# Patient Record
Sex: FEMALE | Race: WHITE | Employment: OTHER | URBAN - METROPOLITAN AREA
[De-identification: names, ages, dates, MRNs, and addresses within clinical notes are randomized per-mention and may not be internally consistent; named-entity substitution may affect disease eponyms.]

---

## 2017-01-13 ENCOUNTER — HOSPITAL ENCOUNTER (EMERGENCY)
Facility: CLINIC | Age: 57
Discharge: HOME | End: 2017-01-13
Attending: EMERGENCY MEDICINE | Admitting: EMERGENCY MEDICINE
Payer: COMMERCIAL

## 2017-01-13 VITALS
OXYGEN SATURATION: 100 % | TEMPERATURE: 97.5 F | DIASTOLIC BLOOD PRESSURE: 110 MMHG | HEART RATE: 71 BPM | RESPIRATION RATE: 18 BRPM | SYSTOLIC BLOOD PRESSURE: 173 MMHG

## 2017-01-13 DIAGNOSIS — R07.0 THROAT PAIN: ICD-10-CM

## 2017-01-13 DIAGNOSIS — R03.0 ELEVATED BLOOD PRESSURE READING WITHOUT DIAGNOSIS OF HYPERTENSION: ICD-10-CM

## 2017-01-13 PROCEDURE — 25000132 ZZH RX MED GY IP 250 OP 250 PS 637: Performed by: EMERGENCY MEDICINE

## 2017-01-13 PROCEDURE — 99284 EMERGENCY DEPT VISIT MOD MDM: CPT | Mod: 25

## 2017-01-13 PROCEDURE — 25000125 ZZHC RX 250: Performed by: EMERGENCY MEDICINE

## 2017-01-13 PROCEDURE — 40000275 ZZH STATISTIC RCP TIME EA 10 MIN

## 2017-01-13 PROCEDURE — 94640 AIRWAY INHALATION TREATMENT: CPT

## 2017-01-13 PROCEDURE — 31525 DX LARYNGOSCOPY EXCL NB: CPT

## 2017-01-13 RX ORDER — DIPHENHYDRAMINE HYDROCHLORIDE AND LIDOCAINE HYDROCHLORIDE AND ALUMINUM HYDROXIDE AND MAGNESIUM HYDRO
5 KIT EVERY 6 HOURS PRN
Qty: 237 ML | Refills: 0 | Status: SHIPPED | OUTPATIENT
Start: 2017-01-13 | End: 2017-01-31

## 2017-01-13 RX ORDER — OXYMETAZOLINE HYDROCHLORIDE 0.05 G/100ML
SPRAY NASAL
Status: DISCONTINUED
Start: 2017-01-13 | End: 2017-01-13 | Stop reason: HOSPADM

## 2017-01-13 RX ADMIN — LIDOCAINE HYDROCHLORIDE 3 ML: 40 INJECTION, SOLUTION RETROBULBAR; TOPICAL at 15:19

## 2017-01-13 RX ADMIN — LIDOCAINE HYDROCHLORIDE 30 ML: 20 SOLUTION ORAL; TOPICAL at 15:45

## 2017-01-13 ASSESSMENT — ENCOUNTER SYMPTOMS
TROUBLE SWALLOWING: 1
VOMITING: 1

## 2017-01-13 NOTE — ED PROVIDER NOTES
History     Chief Complaint:  Swallowed Foreign Body    HPI   Desire Moise is a 56 year old female who presents with a swallowed foreign body. Five days ago, the patient was eating pizza bites, when she felt a small bite get lodged at the base of her throat. She tried to dislodge the foreign body through coughing, but was unsuccessful. Later that night, she vomited, but was still unable to dislodge the pizza bite. The patient still feels something lodged at the bottom of her throat, and is having difficulty swallowing fluids. She has not had solid food since the incident although she has been able to swallow liquids and soft foods without difficulty. She has had no similar episodes in the past. She has had no prior endoscopy procedures. Of note, the patient does report having slight acid reflux that began a year ago, but she is not medicated for this reflux.     Allergies:  The patient has no known drug allergies.     Medications:    Norco  Ibuprofen     Past Medical History:    Diverticulosis  Anemia  Spina bifida occulta     Past Surgical History:    [place] teeth extraction  Hysterectomy     Family History:    Breast cancer  Cardiovascular disease  HLD     Social History:  Relationship status:   Tobacco use: Negative  Alcohol use: Negative  The patient presents alone.      Review of Systems   HENT: Positive for trouble swallowing.         Positive for foreign body obstruction.   Gastrointestinal: Positive for vomiting.   All other systems reviewed and are negative.      Physical Exam   First Vitals:  BP: 173/110 mmHg  Temp: 97.5  F (36.4  C)  Temp src: Oral  Pulse: 71  Resp: 18  SpO2: 100 %    Physical Exam    GEN:    Pleasant, age appropriate.     Resting comfortably in the bed.  HEENT:    Tympanic membranes are clear bilaterally.      Oropharynx is moist.       No tonsillar erythema, exudate or asymmetric edema.     No deviation of the uvula.     No pooling of secretions, trismus or sublingual  edema.  Eyes:    Conjunctiva normal, PERRL  Neck:    Supple, no meningismus.       No pain with manipulation of the hyoid.   CV:     Regular rate and rhythm.     No murmurs, rubs or gallops.    PULM:    Clear to auscultation bilateral.       No respiratory distress.       No stridor.  ABD:    Soft, non-tender, non-distended.      No rebound or guarding.     No splenomegaly.  MSK:     No gross deformity to all four extremities.   LYMPH:   No cervical lymphadenopathy.  NEURO:   Alert.  Normal muscular tone, no atrophy.  Skin:    Warm, dry and intact.    PSYCH:    Mood is good and affect is appropriate.      Emergency Department Course     Procedures:  Physician: Marino Vidal MD    Procedure: Fiberoptic Laryngoscopy    Indication:  Sore throat    The most patent nare was prepped with nebulized Lidocaine 4%, 1 ml, and Afrin nasal spray.      The fiberoptic scope was advanced under direct visualization down to the level of the glottic structures.    Findings:  1.  The epiglottis is normal  2.  The lingual tonsil is normal  3.  The glottic tissues, cords, and pyriform recesses appear normal.  4.  No foreign body is noted.  5.  Mild laryngeal wall erythema    There were no complications to the procedure.      Interventions:  1545: GI cocktail, 30 mL, PO      Emergency Department Course:  Nursing notes and vitals reviewed.  I performed an exam of the patient as documented above.  The above workup was undertaken.  1530: I performed the procedure documented above.  I rechecked the patient and discussed results.    Findings and plan explained to the Patient. Patient discharged home with instructions regarding supportive care, medications, and reasons to return. The importance of close follow-up was reviewed. The patient was prescribed Magic mouthwash.       Impression & Plan      Medical Decision Making:  Desire Moise is a 56 year old female who presents to the ED with concern of retained upper esophageal or  laryngeal foreign body. She had no evidence of airway or esophageal obstruction clinically. Fiberoptic laryngoscopy is without abnormality other than mild irritation of laryngeal wall indicating mild soft tissue injury. Patient improved with GI cocktail and was able to tolerate PO fluids. She is safe for discharge home. Magic mouthwash PRN for throat discomfort. Follow up with PCP and return to ED with worsening symptoms.     Diagnosis:    ICD-10-CM   1. Throat pain R07.0   2. Elevated blood pressure reading without diagnosis of hypertension R03.0     Disposition:  Discharge to home with primary care follow up.    Discharge Medications:  DPH-Lido-AlHydr-MgHydr-Simeth (FIRST-MOUTHWASH BLM) SUSP Swish and swallow 5 mLs in mouth every 6 hours as needed, Disp-237 mL, R-0, Local Print     I, Mustapha Jalloh, am serving as a scribe on 1/13/2017 at 2:50 PM to personally document services performed by Marino Vidal MD, based on my observations and the provider's statements to me.    [facility] EMERGENCY DEPARTMENT        Marino Vidal MD  01/13/17 6814

## 2017-01-13 NOTE — ED AVS SNAPSHOT
[facility] Emergency Department    [address] [place] [address]    Phone:  [phone]    Fax:  [phone]                                       Desire Moise   MRN: [id number]    Department:  [facility] Emergency Department   Date of Visit:  1/13/2017           Patient Information     Date Of Birth          1960        Your diagnoses for this visit were:     Throat pain     Elevated blood pressure reading without diagnosis of hypertension        You were seen by Marino Vidal MD.      Follow-up Information     Follow up with Matilde Christopher. Schedule an appointment as soon as possible for a visit in 5 days.    Specialty:  Internal Medicine    Contact information:    [facility]  [address]   [place] [address]  [phone]          Discharge Instructions       Discharge Instructions  Hypertension - High Blood Pressure    During you visit to the Emergency Department, your blood pressure was higher than the recommended blood pressure.  This may be related to stress, pain, medication or other temporary conditions. In these cases, your blood pressure may return to normal on its own. If you have a history of high blood pressure, you may need to have your doctor adjust your medications. Sometimes, your high measurement here may indicate that you have developed high blood pressure that will stay high unless it is treated. Sudden very high blood pressure can cause problems, but usually high blood pressure causes problems over months to years.      Blood pressure is almost never lowered in the Emergency Department, because studies have shown that lowering blood pressure too quickly is much more dangerous than leaving it alone.    You need to follow up with your doctor in 1-3 days to get your blood pressure rechecked.     Return to the Emergency Department if you start to have:    A severe headache.    Chest pain.    Shortness of  breath.    Weakness or numbness that affects one part of the body.    Confusion.    Vision changes.    Significant swelling of legs and/or eyes.    A reaction to any medication started in the Emergency Department.    What can I do to help myself?    Avoid alcohol.    Take any blood pressure medicine that you are prescribed.    Get a good night s sleep.    Lower your salt intake.    Exercise.    Lose weight.    Manage stress.    If blood pressure medication was started in the Emergency Department:    The medicine may not have an immediate effect. The body and brain determine what blood pressure you have. The medicine s job is to retrain the body s  thermostat  to a lower blood pressure.    You will need to follow up with your doctor to see how this medicine is working for you.  If you were given a prescription for medicine here today, be sure to read all of the information (including the package insert) that comes with your prescription.  This will include important information about the medicine, its side effects, and any warnings that you need to know about.  The pharmacist who fills the prescription can provide more information and answer questions you may have about the medicine.  If you have questions or concerns that the pharmacist cannot address, please call or return to the Emergency Department.       Discharge References/Attachments     ESOPHAGEAL FOREIGN BODY, RESOLVED (ENGLISH)      24 Hour Appointment Hotline       To make an appointment at any [facility] clinic, call [phone] ([phone]). If you don't have a family doctor or clinic, we will help you find one. [facility] clinics are conveniently located to serve the needs of you and your family.             Review of your medicines      START taking        Dose / Directions Last dose taken    FIRST-MOUTHWASH BLM Susp   Dose:  5 mL   Quantity:  237 mL        Swish and swallow 5 mLs in mouth every 6 hours as needed   Refills:  0          Our records  show that you are taking the medicines listed below. If these are incorrect, please call your family doctor or clinic.        Dose / Directions Last dose taken    HYDROcodone-acetaminophen 5-325 MG per tablet   Commonly known as:  NORCO   Dose:  1-2 tablet   Quantity:  20 tablet        Take 1-2 tablets by mouth every 4 hours as needed for pain   Refills:  0        IBUPROFEN PO        Refills:  0                Prescriptions were sent or printed at these locations (1 Prescription)                   Other Prescriptions                Printed at Department/Unit printer (1 of 1)         DPH-Lido-AlHydr-MgHydr-Simeth (FIRST-MOUTHWASH BLM) SUSP                Orders Needing Specimen Collection     None      Pending Results     No orders found from 1/12/2017 to 1/14/2017.            Pending Culture Results     No orders found from 1/12/2017 to 1/14/2017.             Test Results from your hospital stay            Clinical Quality Measure: Blood Pressure Screening     Your blood pressure was checked while you were in the emergency department today. The last reading we obtained was  BP: (!) 164/112 mmHg . Please read the guidelines below about what these numbers mean and what you should do about them.  If your systolic blood pressure (the top number) is less than 120 and your diastolic blood pressure (the bottom number) is less than 80, then your blood pressure is normal. There is nothing more that you need to do about it.  If your systolic blood pressure (the top number) is 120-139 or your diastolic blood pressure (the bottom number) is 80-89, your blood pressure may be higher than it should be. You should have your blood pressure rechecked within a year by a primary care provider.  If your systolic blood pressure (the top number) is 140 or greater or your diastolic blood pressure (the bottom number) is 90 or greater, you may have high blood pressure. High blood pressure is treatable, but if left untreated over time it can  "put you at risk for heart attack, stroke, or kidney failure. You should have your blood pressure rechecked by a primary care provider within the next 4 weeks.  If your provider in the emergency department today gave you specific instructions to follow-up with your doctor or provider even sooner than that, you should follow that instruction and not wait for up to 4 weeks for your follow-up visit.        Thank you for choosing [facility]       Thank you for choosing [facility] for your care. Our goal is always to provide you with excellent care. Hearing back from our patients is one way we can continue to improve our services. Please take a few minutes to complete the written survey that you may receive in the mail after you visit with us. Thank you!        [employer] Information     [employer] lets you send messages to your doctor, view your test results, renew your prescriptions, schedule appointments and more. To sign up, go to [email or web address] . Click on \"Log in\" on the left side of the screen, which will take you to the Welcome page. Then click on \"Sign up Now\" on the right side of the page.     You will be asked to enter the access code listed below, as well as some personal information. Please follow the directions to create your username and password.     Your access code is: [email or web address]  Expires: 2017  1:18 AM     Your access code will  in 90 days. If you need help or a new code, please call your [facility] clinic or [phone].        Care EveryWhere ID     This is your Care EveryWhere ID. This could be used by other organizations to access your [facility] medical records  [id number]        After Visit Summary       This is your record. Keep this with you and show to your community pharmacist(s) and doctor(s) at your next visit.                  "

## 2017-01-13 NOTE — DISCHARGE INSTRUCTIONS
Discharge Instructions  Hypertension - High Blood Pressure    During you visit to the Emergency Department, your blood pressure was higher than the recommended blood pressure.  This may be related to stress, pain, medication or other temporary conditions. In these cases, your blood pressure may return to normal on its own. If you have a history of high blood pressure, you may need to have your doctor adjust your medications. Sometimes, your high measurement here may indicate that you have developed high blood pressure that will stay high unless it is treated. Sudden very high blood pressure can cause problems, but usually high blood pressure causes problems over months to years.      Blood pressure is almost never lowered in the Emergency Department, because studies have shown that lowering blood pressure too quickly is much more dangerous than leaving it alone.    You need to follow up with your doctor in 1-3 days to get your blood pressure rechecked.     Return to the Emergency Department if you start to have:    A severe headache.    Chest pain.    Shortness of breath.    Weakness or numbness that affects one part of the body.    Confusion.    Vision changes.    Significant swelling of legs and/or eyes.    A reaction to any medication started in the Emergency Department.    What can I do to help myself?    Avoid alcohol.    Take any blood pressure medicine that you are prescribed.    Get a good night s sleep.    Lower your salt intake.    Exercise.    Lose weight.    Manage stress.    If blood pressure medication was started in the Emergency Department:    The medicine may not have an immediate effect. The body and brain determine what blood pressure you have. The medicine s job is to retrain the body s  thermostat  to a lower blood pressure.    You will need to follow up with your doctor to see how this medicine is working for you.  If you were given a prescription for medicine here today, be sure to read all of  the information (including the package insert) that comes with your prescription.  This will include important information about the medicine, its side effects, and any warnings that you need to know about.  The pharmacist who fills the prescription can provide more information and answer questions you may have about the medicine.  If you have questions or concerns that the pharmacist cannot address, please call or return to the Emergency Department.

## 2017-01-13 NOTE — ED AVS SNAPSHOT
[facility] Emergency Department    [address]    [facility] [address]    Phone:  [phone]    Fax:  [phone]                                       Desire Moise   MRN: [id number]    Department:  [facility] Emergency Department   Date of Visit:  1/13/2017           After Visit Summary Signature Page     I have received my discharge instructions, and my questions have been answered. I have discussed any challenges I see with this plan with the nurse or doctor.    ..........................................................................................................................................  Patient/Patient Representative Signature      ..........................................................................................................................................  Patient Representative Print Name and Relationship to Patient    ..................................................               ................................................  Date                                            Time    ..........................................................................................................................................  Reviewed by Signature/Title    ...................................................              ..............................................  Date                                                            Time

## 2017-01-13 NOTE — ED NOTES
In Triage: ABC's intact. Alert and oriented x 3.  Pt reports she swallowed pizza wrong on Sunday and it has been stuck in throat since. Pt states she cant take it anymore and is uncomfortable.   Able swallow, but with difficulty. Talking in full sentences without difficulty and no SOB noted.

## 2017-01-31 PROCEDURE — 99284 EMERGENCY DEPT VISIT MOD MDM: CPT | Mod: 25

## 2017-01-31 RX ORDER — IBUPROFEN 600 MG/1
600 TABLET, FILM COATED ORAL ONCE
Status: COMPLETED | OUTPATIENT
Start: 2017-01-31 | End: 2017-01-31

## 2017-01-31 RX ADMIN — IBUPROFEN 600 MG: 600 TABLET ORAL at 23:19

## 2017-02-01 ENCOUNTER — HOSPITAL ENCOUNTER (EMERGENCY)
Facility: CLINIC | Age: 57
Discharge: HOME | End: 2017-02-01
Attending: EMERGENCY MEDICINE | Admitting: EMERGENCY MEDICINE
Payer: COMMERCIAL

## 2017-02-01 ENCOUNTER — APPOINTMENT (OUTPATIENT)
Dept: GENERAL RADIOLOGY | Facility: CLINIC | Age: 57
End: 2017-02-01
Attending: EMERGENCY MEDICINE
Payer: COMMERCIAL

## 2017-02-01 VITALS
DIASTOLIC BLOOD PRESSURE: 80 MMHG | WEIGHT: 150 LBS | SYSTOLIC BLOOD PRESSURE: 147 MMHG | BODY MASS INDEX: 24.11 KG/M2 | OXYGEN SATURATION: 99 % | RESPIRATION RATE: 18 BRPM | TEMPERATURE: 98 F | HEART RATE: 118 BPM | HEIGHT: 66 IN

## 2017-02-01 DIAGNOSIS — R07.89 CHEST WALL PAIN: ICD-10-CM

## 2017-02-01 DIAGNOSIS — S13.9XXA SPRAIN OF NECK, INITIAL ENCOUNTER: ICD-10-CM

## 2017-02-01 RX ORDER — METHOCARBAMOL 750 MG/1
750 TABLET, FILM COATED ORAL ONCE
Status: COMPLETED | OUTPATIENT
Start: 2017-02-01 | End: 2017-02-01

## 2017-02-01 RX ORDER — METHOCARBAMOL 750 MG/1
750 TABLET, FILM COATED ORAL 4 TIMES DAILY PRN
Qty: 20 TABLET | Refills: 0 | Status: SHIPPED | OUTPATIENT
Start: 2017-02-01 | End: 2017-09-07

## 2017-02-01 RX ADMIN — METHOCARBAMOL 750 MG: 750 TABLET ORAL at 00:59

## 2017-02-01 ASSESSMENT — ENCOUNTER SYMPTOMS
BACK PAIN: 1
NECK PAIN: 1

## 2017-02-01 NOTE — ED AVS SNAPSHOT
[facility] Emergency Department    [address] [place] [address]    Phone:  [phone]    Fax:  [phone]                                       Desire Moise   MRN: [id number]    Department:  [facility] Emergency Department   Date of Visit:  1/31/2017           Patient Information     Date Of Birth          1960        Your diagnoses for this visit were:     Chest wall pain     Sprain of neck, initial encounter        You were seen by Amilcar Mark MD.      Follow-up Information     Follow up with Matilde Christopher.    Specialty:  Internal Medicine    Contact information:    [facility]  [address] [place] [address]  [phone]        Discharge References/Attachments     CHEST WALL CONTUSION (ENGLISH)    NECK SPRAIN OR STRAIN (ENGLISH)      24 Hour Appointment Hotline       To make an appointment at any [facility], call [phone]). If you don't have a family doctor or clinic, we will help you find one. [facility] clinics are conveniently located to serve the needs of you and your family.             Review of your medicines      START taking        Dose / Directions Last dose taken    methocarbamol 750 MG tablet   Commonly known as:  ROBAXIN   Dose:  750 mg   Quantity:  20 tablet        Take 1 tablet (750 mg) by mouth 4 times daily as needed for muscle spasms   Refills:  0                Prescriptions were sent or printed at these locations (1 Prescription)                   Other Prescriptions                Printed at Department/Unit printer (1 of 1)         methocarbamol (ROBAXIN) 750 MG tablet                Orders Needing Specimen Collection     None      Pending Results     No orders found from 1/31/2017 to 2/2/2017.            Pending Culture Results     No orders found from 1/31/2017 to 2/2/2017.             Test Results from your hospital stay            Clinical Quality Measure: Blood Pressure  Screening     Your blood pressure was checked while you were in the emergency department today. The last reading we obtained was  BP: (!) 175/101 mmHg . Please read the guidelines below about what these numbers mean and what you should do about them.  If your systolic blood pressure (the top number) is less than 120 and your diastolic blood pressure (the bottom number) is less than 80, then your blood pressure is normal. There is nothing more that you need to do about it.  If your systolic blood pressure (the top number) is 120-139 or your diastolic blood pressure (the bottom number) is 80-89, your blood pressure may be higher than it should be. You should have your blood pressure rechecked within a year by a primary care provider.  If your systolic blood pressure (the top number) is 140 or greater or your diastolic blood pressure (the bottom number) is 90 or greater, you may have high blood pressure. High blood pressure is treatable, but if left untreated over time it can put you at risk for heart attack, stroke, or kidney failure. You should have your blood pressure rechecked by a primary care provider within the next 4 weeks.  If your provider in the emergency department today gave you specific instructions to follow-up with your doctor or provider even sooner than that, you should follow that instruction and not wait for up to 4 weeks for your follow-up visit.        Thank you for choosing [facility]       Thank you for choosing [facility] for your care. Our goal is always to provide you with excellent care. Hearing back from our patients is one way we can continue to improve our services. Please take a few minutes to complete the written survey that you may receive in the mail after you visit with us. Thank you!        [employer] Information     [employer] lets you send messages to your doctor, view your test results, renew your prescriptions, schedule appointments and more. To sign up, go to [email or web address] .  "Click on \"Log in\" on the left side of the screen, which will take you to the Welcome page. Then click on \"Sign up Now\" on the right side of the page.     You will be asked to enter the access code listed below, as well as some personal information. Please follow the directions to create your username and password.     Your access code is: [email or web address]  Expires: 2017  1:18 AM     Your access code will  in 90 days. If you need help or a new code, please call your [facility] clinic or [phone].        Care EveryWhere ID     This is your Care EveryWhere ID. This could be used by other organizations to access your [facility] medical records  [id number]        After Visit Summary       This is your record. Keep this with you and show to your community pharmacist(s) and doctor(s) at your next visit.                  "
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
## 2017-02-01 NOTE — ED AVS SNAPSHOT
[facility] Emergency Department    [address]    [facility] [address]    Phone:  [phone]    Fax:  [phone]                                       Desire Moise   MRN: [id number]    Department:  [facility] Emergency Department   Date of Visit:  1/31/2017           After Visit Summary Signature Page     I have received my discharge instructions, and my questions have been answered. I have discussed any challenges I see with this plan with the nurse or doctor.    ..........................................................................................................................................  Patient/Patient Representative Signature      ..........................................................................................................................................  Patient Representative Print Name and Relationship to Patient    ..................................................               ................................................  Date                                            Time    ..........................................................................................................................................  Reviewed by Signature/Title    ...................................................              ..............................................  Date                                                            Time

## 2017-09-07 ENCOUNTER — APPOINTMENT (OUTPATIENT)
Dept: CT IMAGING | Facility: CLINIC | Age: 57
End: 2017-09-07
Attending: INTERNAL MEDICINE
Payer: COMMERCIAL

## 2017-09-07 ENCOUNTER — APPOINTMENT (OUTPATIENT)
Dept: GENERAL RADIOLOGY | Facility: CLINIC | Age: 57
End: 2017-09-07
Attending: EMERGENCY MEDICINE
Payer: COMMERCIAL

## 2017-09-07 ENCOUNTER — HOSPITAL ENCOUNTER (EMERGENCY)
Facility: CLINIC | Age: 57
Discharge: HOME | End: 2017-09-07
Attending: INTERNAL MEDICINE | Admitting: INTERNAL MEDICINE
Payer: COMMERCIAL

## 2017-09-07 VITALS
TEMPERATURE: 98 F | BODY MASS INDEX: 23.78 KG/M2 | SYSTOLIC BLOOD PRESSURE: 162 MMHG | OXYGEN SATURATION: 100 % | WEIGHT: 148 LBS | RESPIRATION RATE: 16 BRPM | HEIGHT: 66 IN | DIASTOLIC BLOOD PRESSURE: 100 MMHG | HEART RATE: 82 BPM

## 2017-09-07 DIAGNOSIS — S52.571A OTHER CLOSED INTRA-ARTICULAR FRACTURE OF DISTAL END OF RIGHT RADIUS, INITIAL ENCOUNTER: ICD-10-CM

## 2017-09-07 DIAGNOSIS — R11.10 VOMITING, INTRACTABILITY OF VOMITING NOT SPECIFIED, PRESENCE OF NAUSEA NOT SPECIFIED, UNSPECIFIED VOMITING TYPE: ICD-10-CM

## 2017-09-07 PROCEDURE — 73110 X-RAY EXAM OF WRIST: CPT | Mod: RT

## 2017-09-07 PROCEDURE — 96361 HYDRATE IV INFUSION ADD-ON: CPT

## 2017-09-07 PROCEDURE — 99285 EMERGENCY DEPT VISIT HI MDM: CPT | Mod: 25

## 2017-09-07 PROCEDURE — 96374 THER/PROPH/DIAG INJ IV PUSH: CPT

## 2017-09-07 PROCEDURE — 25600 CLTX DST RDL FX/EPHYS SEP WO: CPT | Mod: RT

## 2017-09-07 PROCEDURE — 25000128 H RX IP 250 OP 636: Performed by: PHYSICIAN ASSISTANT

## 2017-09-07 PROCEDURE — 96376 TX/PRO/DX INJ SAME DRUG ADON: CPT

## 2017-09-07 PROCEDURE — 25000128 H RX IP 250 OP 636: Performed by: INTERNAL MEDICINE

## 2017-09-07 PROCEDURE — 70450 CT HEAD/BRAIN W/O DYE: CPT

## 2017-09-07 PROCEDURE — 96375 TX/PRO/DX INJ NEW DRUG ADDON: CPT

## 2017-09-07 RX ORDER — HYDROCODONE BITARTRATE AND ACETAMINOPHEN 5; 325 MG/1; MG/1
1-2 TABLET ORAL EVERY 4 HOURS PRN
Qty: 15 TABLET | Refills: 0 | Status: ON HOLD | OUTPATIENT
Start: 2017-09-07 | End: 2017-09-11

## 2017-09-07 RX ORDER — SODIUM CHLORIDE 9 MG/ML
1000 INJECTION, SOLUTION INTRAVENOUS CONTINUOUS
Status: DISCONTINUED | OUTPATIENT
Start: 2017-09-07 | End: 2017-09-08 | Stop reason: HOSPADM

## 2017-09-07 RX ORDER — HYDROMORPHONE HYDROCHLORIDE 1 MG/ML
0.5 INJECTION, SOLUTION INTRAMUSCULAR; INTRAVENOUS; SUBCUTANEOUS
Status: DISCONTINUED | OUTPATIENT
Start: 2017-09-07 | End: 2017-09-08 | Stop reason: HOSPADM

## 2017-09-07 RX ORDER — ONDANSETRON 2 MG/ML
INJECTION INTRAMUSCULAR; INTRAVENOUS
Status: DISCONTINUED
Start: 2017-09-07 | End: 2017-09-07 | Stop reason: HOSPADM

## 2017-09-07 RX ORDER — ONDANSETRON 2 MG/ML
4 INJECTION INTRAMUSCULAR; INTRAVENOUS ONCE
Status: COMPLETED | OUTPATIENT
Start: 2017-09-07 | End: 2017-09-07

## 2017-09-07 RX ORDER — ONDANSETRON 4 MG/1
4 TABLET, ORALLY DISINTEGRATING ORAL EVERY 8 HOURS PRN
Qty: 10 TABLET | Refills: 0 | Status: ON HOLD | OUTPATIENT
Start: 2017-09-07 | End: 2017-09-11

## 2017-09-07 RX ORDER — METOCLOPRAMIDE HYDROCHLORIDE 5 MG/ML
10 INJECTION INTRAMUSCULAR; INTRAVENOUS ONCE
Status: DISCONTINUED | OUTPATIENT
Start: 2017-09-07 | End: 2017-09-08 | Stop reason: HOSPADM

## 2017-09-07 RX ADMIN — ONDANSETRON 4 MG: 2 INJECTION INTRAMUSCULAR; INTRAVENOUS at 18:10

## 2017-09-07 RX ADMIN — SODIUM CHLORIDE 1000 ML: 9 INJECTION, SOLUTION INTRAVENOUS at 18:23

## 2017-09-07 RX ADMIN — HYDROMORPHONE HYDROCHLORIDE 0.5 MG: 1 INJECTION, SOLUTION INTRAMUSCULAR; INTRAVENOUS; SUBCUTANEOUS at 17:14

## 2017-09-07 RX ADMIN — ONDANSETRON 4 MG: 2 INJECTION INTRAMUSCULAR; INTRAVENOUS at 17:51

## 2017-09-07 RX ADMIN — HYDROMORPHONE HYDROCHLORIDE 0.5 MG: 1 INJECTION, SOLUTION INTRAMUSCULAR; INTRAVENOUS; SUBCUTANEOUS at 16:10

## 2017-09-07 RX ADMIN — SODIUM CHLORIDE 1000 ML: 9 INJECTION, SOLUTION INTRAVENOUS at 16:10

## 2017-09-07 ASSESSMENT — ENCOUNTER SYMPTOMS
NAUSEA: 0
HEADACHES: 0
CONFUSION: 0
JOINT SWELLING: 0

## 2017-09-07 NOTE — ED PROVIDER NOTES
"  History     Chief Complaint:  Wrist Pain    HPI   Desire Moise is a right hand dominant 57 year old female who presents with right wrist pain. The patient states that she was riding a horse by herself this evening when she had fallen off and landed on an outstretched right hand, prompting her ED visit. While here, she notes that she did not hit her head or chest during the fall and denies upper arm pain, chest pain, headache, confusion, or nausea after the fall.       Allergies:  NKDA     Medications:    The patient is currently on no regular medications.     Past Medical History:    Diverticulosis  Spina bifida occulta  Anemia    Past Surgical History:    Hysterectomy  Incision of hymen  [place] teeth    Family History:    Heart Disease  Hyperlipidemia    Social History:  Presents with her   Negative for alcohol use.   Negative for tobacco use.   Marital Status:   [2]     Review of Systems   Cardiovascular: Negative for chest pain.   Gastrointestinal: Negative for nausea.   Musculoskeletal: Negative for joint swelling.        Right wrist pain    Neurological: Negative for headaches.   Psychiatric/Behavioral: Negative for confusion.   All other systems reviewed and are negative.      Physical Exam   First Vitals:  BP: (!) 152/95  Pulse: 85  Heart Rate: 85  Temp: 98  F (36.7  C)  Resp: 16  Height: 167.6 cm (5' 6\")  Weight: 67.1 kg (148 lb)  SpO2: 100 %      Physical Exam   Constitutional: She is cooperative.   HENT:   Right Ear: Tympanic membrane normal.   Left Ear: Tympanic membrane normal.   Mouth/Throat: Oropharynx is clear and moist and mucous membranes are normal.   Eyes: Conjunctivae are normal.   Neck: Normal range of motion.   Cardiovascular: Regular rhythm, normal heart sounds and normal pulses.    Pulmonary/Chest: Effort normal and breath sounds normal.   Abdominal: Soft. Normal appearance and bowel sounds are normal. There is no rebound and no guarding.   Musculoskeletal: Normal range of " motion.        Right wrist: She exhibits tenderness and swelling.   Lymphadenopathy:     She has no cervical adenopathy.   Neurological: She is alert. No sensory deficit.   Skin: Skin is warm and dry.   Psychiatric: She has a normal mood and affect.         Emergency Department Course     Imaging:  Radiographic findings were communicated with the patient who voiced understanding of the findings.  XR Wrist, Right, G/E 3 views:   Mildly compacted and angulated intra-articular fracture of  the distal right radius. No other areas suspicious for acute fracture  are identified in the right wrist. As per radiology.     CT Head without contrast:   Normal head CT. As per radiology.     Procedures:  A sugar glass splint was applied to the right short arm extremity and after placement I checked and adjusted the fit to ensure proper positioning. The patient was more comfortable with the splint in place. Sensation and circulation are intact after splint placement.     Interventions:    1610 Dilaudid, 0.5 mg, IV  1714 Dilaudid, 0.5 mg, IV  1751 Zofran, 4 mg, IV  1810 Zofran, 4 mg, IV    Emergency Department Course:  Nursing notes and vitals reviewed. I performed an exam of the patient as documented above.     Medicine administered as documented above.    The patient was sent for a right wrist XR while in the emergency department, findings above.     1722 I placed a splint on her right wrist, procedure note above.     1830 I rechecked the patient and discussed the results of their workup thus far.  The family notes an area of bruising on right forehead.     The patient was sent for a head CT while in the emergency department, findings above.     Findings and plan explained to the Patient. Patient discharged home with instructions regarding supportive care, medications, and reasons to return. The importance of close follow-up was reviewed. The patient was prescribed Norco.    I personally reviewed the laboratory results with the  Patient and answered all related questions prior to discharge.       Impression & Plan      Medical Decision Making:  Desire Moise is a 57 year old female who presents complaining of wrist pain after a fall from a horse. X-ray demonstrates a distal radial fracture, this apparently has a settle intraarticular component. As noted, this was splinted. Unfortunately, the patient became very nauseated. This may have been related to her medication that she received, but her family was also concerned that there may have been a head injury. They did note an area of bruising on the right forehead. CT was obtained that was negative for intracranial bleed. She has not had other symptoms to suggest concussion. There may be post traumatic vertigo. I offered the patient admission to observation unit for further symptomatic management, but after several doses of antiemetics, she is now feeling improved and ready for discharge home. She was ambulatory in the ED without support. I will discharge her with Vicodin as needed, Zofran for nausea, return to ED if worse, follow up with Dr. Villarreal of hand surgery in 3-5 days.     Diagnosis:     ICD-10-CM   1. Other closed intra-articular fracture of distal end of right radius, initial encounter S52.571A       Disposition:  discharged to home    Discharge Medications:  HYDROcodone-acetaminophen (NORCO) 5-325 MG per tablet  Take 1-2 tablets by mouth every 4 hours as needed for moderate to severe pain     ondansetron (ZOFRAN ODT) 4 MG ODT tab  Take 1 tablet (4 mg) by mouth every 8 hours as needed for nausea         I, Corin Cho, am serving as a scribe on 9/7/2017 at 4:52 PM to personally document services performed by Eva Sierra MD based on my observations and the provider's statements to me.    Corin Cho  9/7/2017   [facility] EMERGENCY DEPARTMENT       Eva Sierra MD  09/08/17 0008

## 2017-09-07 NOTE — ED AVS SNAPSHOT
[facility] Emergency Department    [address]    [facility] [address]    Phone:  [phone]    Fax:  [phone]                                       Desire Moise   MRN: [id number]    Department:  [facility] Emergency Department   Date of Visit:  9/7/2017           Patient Information     Date Of Birth          1960        Your diagnoses for this visit were:     Other closed intra-articular fracture of distal end of right radius, initial encounter     Vomiting, intractability of vomiting not specified, presence of nausea not specified, unspecified vomiting type        You were seen by Eva Sierra MD.      Follow-up Information     Follow up with Santi Villarreal MD In 3 days.    Specialty:  Orthopedics    Why:  call for appointment in 3-5 days    Contact information:    [facility]TH[facility]  [address]  [facility] [address]  [phone]          Discharge Instructions       Discharge Instructions  Splint Care    You had a splint put on today to help protect your injury and help it heal.  Splints are used to treat things like strains, sprains, cuts and fractures (broken bones).    Be sure your splint is not too tight!  If you splint is too tight, it may cause loss of blood supply.  Signs of your splint being too tight include:  your arm or leg hurting a lot more; your fingers or toes getting numb, cold, pale or blue; or your child is crying, fussing or seeming restless.    Return to the Emergency Department right away if:    You have increased pain or pressure around the injury.    You have numbness, tingling, or cool, pale, or blue toes or fingers past the injury.    Your child is more fussy than normal, crying a lot, or restless.    Your splint becomes soft, breaks, or is wet.    Your splint begins to smell bad.    Your splint is cutting into your skin.    Home care:    Keep the injured area above the level of your heart while laying  or sitting down.  This will help decrease the swelling and the pain.    Keep the splint dry.    Do not put objects down or inside the splint.    If there is an elastic bandage (Ace  wrap) holding the splint on this may be loosened slightly to relieve pressure or pain.  If pain continues return to the Emergency Department right away.    Do not remove your splint by yourself unless told to by your doctor.    Follow-up:  Sometimes the splint put on in the Emergency Department needs to be changed once the swelling has gone down and a more permanent cast needs to be placed.  This is usually done by a bone specialist doctor (Orthopedist).  Follow the instructions given to you by your doctor today.    X-rays:  X-rays done today were read by your doctor but will also be read by a radiologist.  We will contact you if the radiologist sees anything different on the x-ray.  Your regular doctor may also want to review your x-rays on follow-up.    You could have a fracture (break), even if we told you your x-rays were normal. X-rays are not always certain, and some fractures are hard to see and may not show up right away.  Also, your x-ray may look like you have a fracture, even though you do not.  It is important to follow-up with your regular doctor.     If you were given a prescription for medicine here today, be sure to read all of the information (including the package insert) that comes with your prescription.  This will include important information about the medicine, its side effects, and any warnings that you need to know about.  The pharmacist who fills the prescription can provide more information and answer questions you may have about the medicine.  If you have questions or concerns that the pharmacist cannot address, please call or return to the Emergency Department.   Opioid Medication Information    Pain medications are among the most commonly prescribed medicines, so we are including this information for all our  patients. If you did not receive pain medication or get a prescription for pain medicine, you can ignore it.     You may have been given a prescription for an opioid (narcotic) pain medicine and/or have received a pain medicine while here in the Emergency Department. These medicines can make you drowsy or impaired. You must not drive, operate dangerous equipment, or engage in any other dangerous activities while taking these medications. If you drive while taking these medications, you could be arrested for DUI, or driving under the influence. Do not drink any alcohol while you are taking these medications.     Opioid pain medications can cause addiction. If you have a history of chemical dependency of any type, you are at a higher risk of becoming addicted to pain medications.  Only take these prescribed medications to treat your pain when all other options have been tried. Take it for as short a time and as few doses as possible. Store your pain pills in a secure place, as they are frequently stolen and provide a dangerous opportunity for children or visitors in your house to start abusing these powerful medications. We will not replace any lost or stolen medicine.  As soon as your pain is better, you should flush all your remaining medication.     Many prescription pain medications contain Tylenol  (acetaminophen), including Vicodin , Tylenol #3 , Norco , Lortab , and Percocet .  You should not take any extra pills of Tylenol  if you are using these prescription medications or you can get very sick.  Do not ever take more than 3000 mg of acetaminophen in any 24 hour period.    All opioids tend to cause constipation. Drink plenty of water and eat foods that have a lot of fiber, such as fruits, vegetables, prune juice, apple juice and high fiber cereal.  Take a laxative if you don t move your bowels at least every other day. Miralax , Milk of Magnesia, Colace , or Senna  can be used to keep you regular.       Remember that you can always come back to the Emergency Department if you are not able to see your regular doctor in the amount of time listed above, if you get any new symptoms, or if there is anything that worries you.    24 Hour Appointment Hotline       To make an appointment at any [facility], call [phone] ([phone]). If you don't have a family doctor or clinic, we will help you find one. [facility] are conveniently located to serve the needs of you and your family.             Review of your medicines      START taking        Dose / Directions Last dose taken    HYDROcodone-acetaminophen 5-325 MG per tablet   Commonly known as:  NORCO   Dose:  1-2 tablet   Quantity:  15 tablet        Take 1-2 tablets by mouth every 4 hours as needed for moderate to severe pain   Refills:  0        ondansetron 4 MG ODT tab   Commonly known as:  ZOFRAN ODT   Dose:  4 mg   Quantity:  10 tablet        Take 1 tablet (4 mg) by mouth every 8 hours as needed for nausea   Refills:  0                Prescriptions were sent or printed at these locations (2 Prescriptions)                   Other Prescriptions                Printed at Department/Unit printer (2 of 2)         HYDROcodone-acetaminophen (NORCO) 5-325 MG per tablet               ondansetron (ZOFRAN ODT) 4 MG ODT tab                Procedures and tests performed during your visit     Head CT w/o contrast    Peripheral IV catheter    Wrist XR, G/E 3 views, right      Orders Needing Specimen Collection     None      Pending Results     Date and Time Order Name Status Description    9/7/2017 1851 Head CT w/o contrast Preliminary             Pending Culture Results     No orders found from 9/5/2017 to 9/8/2017.            Pending Results Instructions     If you had any lab results that were not finalized at the time of your Discharge, you can call the ED Lab Result RN at [phone]. You will be contacted by this team for any positive Lab results or  changes in treatment. The nurses are available 7 days a week from 10A to 6:30P.  You can leave a message 24 hours per day and they will return your call.        Test Results From Your Hospital Stay        9/7/2017  4:47 PM      Narrative     RIGHT WRIST 3 VIEWS   9/7/2017 4:33 PM     HISTORY: Right wrist pain.    COMPARISON: None.        Impression     IMPRESSION: Mildly compacted and angulated intra-articular fracture of  the distal right radius. No other areas suspicious for acute fracture  are identified in the right wrist.    AMANDA SULLIVAN MD         9/7/2017  7:50 PM      Narrative     CT OF THE HEAD WITHOUT CONTRAST 9/7/2017 7:46 PM     COMPARISON: Brain MRI 8/7/2015    HISTORY: Dizzy    TECHNIQUE: Axial CT images of the head from the skull base to the  vertex were acquired without IV contrast.    FINDINGS: The ventricles and basal cisterns are within normal limits  in configuration. There is no midline shift. There are no extra-axial  fluid collections. Gray-white differentiation is well maintained.    No intracranial hemorrhage, mass or recent infarct.    The visualized paranasal sinuses are well-aerated. There is no  mastoiditis. There are no fractures of the visualized bones.        Impression     IMPRESSION: Normal head CT.      Radiation dose for this scan was reduced using automated exposure  control, adjustment of the mA and/or kV according to patient size, or  iterative reconstruction technique                Clinical Quality Measure: Blood Pressure Screening     Your blood pressure was checked while you were in the emergency department today. The last reading we obtained was  BP: (!) 162/100 . Please read the guidelines below about what these numbers mean and what you should do about them.  If your systolic blood pressure (the top number) is less than 120 and your diastolic blood pressure (the bottom number) is less than 80, then your blood pressure is normal. There is nothing more that you need  "to do about it.  If your systolic blood pressure (the top number) is 120-139 or your diastolic blood pressure (the bottom number) is 80-89, your blood pressure may be higher than it should be. You should have your blood pressure rechecked within a year by a primary care provider.  If your systolic blood pressure (the top number) is 140 or greater or your diastolic blood pressure (the bottom number) is 90 or greater, you may have high blood pressure. High blood pressure is treatable, but if left untreated over time it can put you at risk for heart attack, stroke, or kidney failure. You should have your blood pressure rechecked by a primary care provider within the next 4 weeks.  If your provider in the emergency department today gave you specific instructions to follow-up with your doctor or provider even sooner than that, you should follow that instruction and not wait for up to 4 weeks for your follow-up visit.        Thank you for choosing [facility]       Thank you for choosing [facility] for your care. Our goal is always to provide you with excellent care. Hearing back from our patients is one way we can continue to improve our services. Please take a few minutes to complete the written survey that you may receive in the mail after you visit with us. Thank you!        [employer] Information     [employer] lets you send messages to your doctor, view your test results, renew your prescriptions, schedule appointments and more. To sign up, go to [email or web address] . Click on \"Log in\" on the left side of the screen, which will take you to the Welcome page. Then click on \"Sign up Now\" on the right side of the page.     You will be asked to enter the access code listed below, as well as some personal information. Please follow the directions to create your username and password.     Your access code is: [email or web address]  Expires: 2017  5:31 PM     Your access code will  in 90 days. If you need help or a new code, " please call your [facility] clinic or [phone].        Care EveryWhere ID     This is your Care EveryWhere ID. This could be used by other organizations to access your [facility] medical records  [id number]        Equal Access to Services     FRANK ALBERT : Dc Moses, waaxda luqadaha, qaybta kaalmada sara, navni mackey. So [facility] [phone].    ATENCIÓN: Si habla español, tiene a manzo disposición servicios gratuitos de asistencia lingüística. Llame al [phone].    We comply with applicable federal civil rights laws and [place] laws. We do not discriminate on the basis of race, color, national origin, age, disability sex, sexual orientation or gender identity.            After Visit Summary       This is your record. Keep this with you and show to your community pharmacist(s) and doctor(s) at your next visit.

## 2017-09-07 NOTE — ED NOTES
Fell off horse and injured right wrist . No LOC.  Pain and swelling present in right wrist.  CMS intact. Became faint in triage and states pain very severe.  Patient alert and oriented x3.  Airway, breathing and circulation intact.

## 2017-09-07 NOTE — ED AVS SNAPSHOT
[facility] Emergency Department    [address]    [facility] [address]    Phone:  [phone]    Fax:  [phone]                                       Desire Moise   MRN: [id number]    Department:  [facility] [place] Emergency Department   Date of Visit:  9/7/2017           After Visit Summary Signature Page     I have received my discharge instructions, and my questions have been answered. I have discussed any challenges I see with this plan with the nurse or doctor.    ..........................................................................................................................................  Patient/Patient Representative Signature      ..........................................................................................................................................  Patient Representative Print Name and Relationship to Patient    ..................................................               ................................................  Date                                            Time    ..........................................................................................................................................  Reviewed by Signature/Title    ...................................................              ..............................................  Date                                                            Time

## 2017-09-07 NOTE — DISCHARGE INSTRUCTIONS
Discharge Instructions  Splint Care    You had a splint put on today to help protect your injury and help it heal.  Splints are used to treat things like strains, sprains, cuts and fractures (broken bones).    Be sure your splint is not too tight!  If you splint is too tight, it may cause loss of blood supply.  Signs of your splint being too tight include:  your arm or leg hurting a lot more; your fingers or toes getting numb, cold, pale or blue; or your child is crying, fussing or seeming restless.    Return to the Emergency Department right away if:    You have increased pain or pressure around the injury.    You have numbness, tingling, or cool, pale, or blue toes or fingers past the injury.    Your child is more fussy than normal, crying a lot, or restless.    Your splint becomes soft, breaks, or is wet.    Your splint begins to smell bad.    Your splint is cutting into your skin.    Home care:    Keep the injured area above the level of your heart while laying or sitting down.  This will help decrease the swelling and the pain.    Keep the splint dry.    Do not put objects down or inside the splint.    If there is an elastic bandage (Ace  wrap) holding the splint on this may be loosened slightly to relieve pressure or pain.  If pain continues return to the Emergency Department right away.    Do not remove your splint by yourself unless told to by your doctor.    Follow-up:  Sometimes the splint put on in the Emergency Department needs to be changed once the swelling has gone down and a more permanent cast needs to be placed.  This is usually done by a bone specialist doctor (Orthopedist).  Follow the instructions given to you by your doctor today.    X-rays:  X-rays done today were read by your doctor but will also be read by a radiologist.  We will contact you if the radiologist sees anything different on the x-ray.  Your regular doctor may also want to review your x-rays on follow-up.    You could have a  fracture (break), even if we told you your x-rays were normal. X-rays are not always certain, and some fractures are hard to see and may not show up right away.  Also, your x-ray may look like you have a fracture, even though you do not.  It is important to follow-up with your regular doctor.     If you were given a prescription for medicine here today, be sure to read all of the information (including the package insert) that comes with your prescription.  This will include important information about the medicine, its side effects, and any warnings that you need to know about.  The pharmacist who fills the prescription can provide more information and answer questions you may have about the medicine.  If you have questions or concerns that the pharmacist cannot address, please call or return to the Emergency Department.   Opioid Medication Information    Pain medications are among the most commonly prescribed medicines, so we are including this information for all our patients. If you did not receive pain medication or get a prescription for pain medicine, you can ignore it.     You may have been given a prescription for an opioid (narcotic) pain medicine and/or have received a pain medicine while here in the Emergency Department. These medicines can make you drowsy or impaired. You must not drive, operate dangerous equipment, or engage in any other dangerous activities while taking these medications. If you drive while taking these medications, you could be arrested for DUI, or driving under the influence. Do not drink any alcohol while you are taking these medications.     Opioid pain medications can cause addiction. If you have a history of chemical dependency of any type, you are at a higher risk of becoming addicted to pain medications.  Only take these prescribed medications to treat your pain when all other options have been tried. Take it for as short a time and as few doses as possible. Store your pain  pills in a secure place, as they are frequently stolen and provide a dangerous opportunity for children or visitors in your house to start abusing these powerful medications. We will not replace any lost or stolen medicine.  As soon as your pain is better, you should flush all your remaining medication.     Many prescription pain medications contain Tylenol  (acetaminophen), including Vicodin , Tylenol #3 , Norco , Lortab , and Percocet .  You should not take any extra pills of Tylenol  if you are using these prescription medications or you can get very sick.  Do not ever take more than 3000 mg of acetaminophen in any 24 hour period.    All opioids tend to cause constipation. Drink plenty of water and eat foods that have a lot of fiber, such as fruits, vegetables, prune juice, apple juice and high fiber cereal.  Take a laxative if you don t move your bowels at least every other day. Miralax , Milk of Magnesia, Colace , or Senna  can be used to keep you regular.      Remember that you can always come back to the Emergency Department if you are not able to see your regular doctor in the amount of time listed above, if you get any new symptoms, or if there is anything that worries you.

## 2017-09-08 LAB — INTERPRETATION ECG - MUSE: NORMAL

## 2017-09-08 NOTE — ED NOTES
"Pt continues to have intermittent N/V, but states at this time she does not feel nauseated.  Family verbalized concern regarding her dizziness that \"maybe she hit her head and has a concussion.\"  Vivek Brower MD made aware.  "

## 2017-09-08 NOTE — ED NOTES
Pt to bedside commode as she is so dizzy, unsafe to use the bathroom.  Pt required assist x2 to transfer to the commode and back to the bed.  Vivek Brower MD made aware.  Vivek Brower stated pt should get another liter of fluid and we will continue to watch her for another hour or 2.

## 2017-09-08 NOTE — ED NOTES
Pt returned from CT, pt states she is still dizzy, and nauseated.  Pt states she is unable to take a deep breath.  Await CT results.

## 2017-09-11 ENCOUNTER — APPOINTMENT (OUTPATIENT)
Dept: GENERAL RADIOLOGY | Facility: CLINIC | Age: 57
End: 2017-09-11
Attending: ORTHOPAEDIC SURGERY
Payer: COMMERCIAL

## 2017-09-11 ENCOUNTER — SURGERY (OUTPATIENT)
Age: 57
End: 2017-09-11

## 2017-09-11 ENCOUNTER — HOSPITAL ENCOUNTER (OUTPATIENT)
Facility: CLINIC | Age: 57
Discharge: HOME | End: 2017-09-11
Attending: ORTHOPAEDIC SURGERY | Admitting: ORTHOPAEDIC SURGERY
Payer: COMMERCIAL

## 2017-09-11 ENCOUNTER — ANESTHESIA (OUTPATIENT)
Dept: SURGERY | Facility: CLINIC | Age: 57
End: 2017-09-11
Payer: COMMERCIAL

## 2017-09-11 ENCOUNTER — ANESTHESIA EVENT (OUTPATIENT)
Dept: SURGERY | Facility: CLINIC | Age: 57
End: 2017-09-11
Payer: COMMERCIAL

## 2017-09-11 VITALS
HEIGHT: 66 IN | TEMPERATURE: 98.2 F | SYSTOLIC BLOOD PRESSURE: 144 MMHG | BODY MASS INDEX: 23.46 KG/M2 | RESPIRATION RATE: 16 BRPM | WEIGHT: 146 LBS | OXYGEN SATURATION: 96 % | HEART RATE: 60 BPM | DIASTOLIC BLOOD PRESSURE: 85 MMHG

## 2017-09-11 DIAGNOSIS — S52.531A CLOSED COLLES' FRACTURE OF RIGHT RADIUS, INITIAL ENCOUNTER: Primary | ICD-10-CM

## 2017-09-11 PROCEDURE — 27210995 ZZH RX 272: Performed by: ORTHOPAEDIC SURGERY

## 2017-09-11 PROCEDURE — 25000125 ZZHC RX 250: Performed by: ANESTHESIOLOGY

## 2017-09-11 PROCEDURE — 37000009 ZZH ANESTHESIA TECHNICAL FEE, EACH ADDTL 15 MIN: Performed by: ORTHOPAEDIC SURGERY

## 2017-09-11 PROCEDURE — 25000132 ZZH RX MED GY IP 250 OP 250 PS 637: Performed by: ORTHOPAEDIC SURGERY

## 2017-09-11 PROCEDURE — 25000128 H RX IP 250 OP 636: Performed by: NURSE ANESTHETIST, CERTIFIED REGISTERED

## 2017-09-11 PROCEDURE — 27210794 ZZH OR GENERAL SUPPLY STERILE: Performed by: ORTHOPAEDIC SURGERY

## 2017-09-11 PROCEDURE — 36000060 ZZH SURGERY LEVEL 3 W FLUORO 1ST 30 MIN: Performed by: ORTHOPAEDIC SURGERY

## 2017-09-11 PROCEDURE — 37000008 ZZH ANESTHESIA TECHNICAL FEE, 1ST 30 MIN: Performed by: ORTHOPAEDIC SURGERY

## 2017-09-11 PROCEDURE — 25000128 H RX IP 250 OP 636: Performed by: ORTHOPAEDIC SURGERY

## 2017-09-11 PROCEDURE — 25000128 H RX IP 250 OP 636: Performed by: ANESTHESIOLOGY

## 2017-09-11 PROCEDURE — 25000132 ZZH RX MED GY IP 250 OP 250 PS 637: Performed by: ANESTHESIOLOGY

## 2017-09-11 PROCEDURE — C1713 ANCHOR/SCREW BN/BN,TIS/BN: HCPCS | Performed by: ORTHOPAEDIC SURGERY

## 2017-09-11 PROCEDURE — 71000027 ZZH RECOVERY PHASE 2 EACH 15 MINS: Performed by: ORTHOPAEDIC SURGERY

## 2017-09-11 PROCEDURE — 25000566 ZZH SEVOFLURANE, EA 15 MIN: Performed by: ORTHOPAEDIC SURGERY

## 2017-09-11 PROCEDURE — 25000125 ZZHC RX 250: Performed by: NURSE ANESTHETIST, CERTIFIED REGISTERED

## 2017-09-11 PROCEDURE — S0020 INJECTION, BUPIVICAINE HYDRO: HCPCS | Performed by: ORTHOPAEDIC SURGERY

## 2017-09-11 PROCEDURE — 71000012 ZZH RECOVERY PHASE 1 LEVEL 1 FIRST HR: Performed by: ORTHOPAEDIC SURGERY

## 2017-09-11 PROCEDURE — 25000125 ZZHC RX 250: Performed by: ORTHOPAEDIC SURGERY

## 2017-09-11 PROCEDURE — 40000277 XR SURGERY CARM FLUORO LESS THAN 5 MIN W STILLS: Mod: TC

## 2017-09-11 PROCEDURE — 40000306 ZZH STATISTIC PRE PROC ASSESS II: Performed by: ORTHOPAEDIC SURGERY

## 2017-09-11 PROCEDURE — 71000013 ZZH RECOVERY PHASE 1 LEVEL 1 EA ADDTL HR: Performed by: ORTHOPAEDIC SURGERY

## 2017-09-11 PROCEDURE — 36000058 ZZH SURGERY LEVEL 3 EA 15 ADDTL MIN: Performed by: ORTHOPAEDIC SURGERY

## 2017-09-11 DEVICE — IMP SCR CORTICAL HANDINN 3.5X12MM CS12000
Type: IMPLANTABLE DEVICE | Site: WRIST | Status: NON-FUNCTIONAL
Removed: 2018-02-02

## 2017-09-11 DEVICE — IMP PEG HANDINN SUBCHONDRAL 2.0X16MM P16000
Type: IMPLANTABLE DEVICE | Site: WRIST | Status: NON-FUNCTIONAL
Removed: 2018-02-02

## 2017-09-11 DEVICE — IMP PEG HANDINN SUBCHONDRAL 2.0X14MM P14000
Type: IMPLANTABLE DEVICE | Site: WRIST | Status: NON-FUNCTIONAL
Removed: 2018-02-02

## 2017-09-11 DEVICE — IMPLANTABLE DEVICE
Type: IMPLANTABLE DEVICE | Status: NON-FUNCTIONAL
Removed: 2018-02-02

## 2017-09-11 DEVICE — IMP SCR CORTICAL HANDINN 3.5X13MM CS13000
Type: IMPLANTABLE DEVICE | Site: WRIST | Status: NON-FUNCTIONAL
Removed: 2018-02-02

## 2017-09-11 DEVICE — IMP PEG FIX HANDINN 2.5X20MM THRD FP20
Type: IMPLANTABLE DEVICE | Site: WRIST | Status: NON-FUNCTIONAL
Removed: 2018-02-02

## 2017-09-11 DEVICE — IMP PEG HANDINN SUBCHONDRAL 2.0X18MM P18000
Type: IMPLANTABLE DEVICE | Site: WRIST | Status: NON-FUNCTIONAL
Removed: 2018-02-02

## 2017-09-11 DEVICE — IMP WIRE KIRSCHNER 1.6MM KW062SS
Type: IMPLANTABLE DEVICE | Site: WRIST | Status: NON-FUNCTIONAL
Removed: 2018-02-02

## 2017-09-11 DEVICE — IMP SCR CORTICAL HANDINN 3.5X14MM CS14000
Type: IMPLANTABLE DEVICE | Site: WRIST | Status: NON-FUNCTIONAL
Removed: 2018-02-02

## 2017-09-11 RX ORDER — MEPERIDINE HYDROCHLORIDE 25 MG/ML
12.5 INJECTION INTRAMUSCULAR; INTRAVENOUS; SUBCUTANEOUS
Status: DISCONTINUED | OUTPATIENT
Start: 2017-09-11 | End: 2017-09-11 | Stop reason: HOSPADM

## 2017-09-11 RX ORDER — KETAMINE HYDROCHLORIDE 10 MG/ML
INJECTION, SOLUTION INTRAMUSCULAR; INTRAVENOUS PRN
Status: DISCONTINUED | OUTPATIENT
Start: 2017-09-11 | End: 2017-09-11

## 2017-09-11 RX ORDER — ONDANSETRON 2 MG/ML
INJECTION INTRAMUSCULAR; INTRAVENOUS PRN
Status: DISCONTINUED | OUTPATIENT
Start: 2017-09-11 | End: 2017-09-11

## 2017-09-11 RX ORDER — SODIUM CHLORIDE, SODIUM LACTATE, POTASSIUM CHLORIDE, CALCIUM CHLORIDE 600; 310; 30; 20 MG/100ML; MG/100ML; MG/100ML; MG/100ML
INJECTION, SOLUTION INTRAVENOUS CONTINUOUS
Status: DISCONTINUED | OUTPATIENT
Start: 2017-09-11 | End: 2017-09-11 | Stop reason: HOSPADM

## 2017-09-11 RX ORDER — LIDOCAINE 40 MG/G
CREAM TOPICAL
Status: DISCONTINUED | OUTPATIENT
Start: 2017-09-11 | End: 2017-09-11 | Stop reason: HOSPADM

## 2017-09-11 RX ORDER — FENTANYL CITRATE 50 UG/ML
25-50 INJECTION, SOLUTION INTRAMUSCULAR; INTRAVENOUS
Status: DISCONTINUED | OUTPATIENT
Start: 2017-09-11 | End: 2017-09-11 | Stop reason: HOSPADM

## 2017-09-11 RX ORDER — KETOROLAC TROMETHAMINE 30 MG/ML
INJECTION, SOLUTION INTRAMUSCULAR; INTRAVENOUS PRN
Status: DISCONTINUED | OUTPATIENT
Start: 2017-09-11 | End: 2017-09-11

## 2017-09-11 RX ORDER — LABETALOL HYDROCHLORIDE 5 MG/ML
INJECTION, SOLUTION INTRAVENOUS PRN
Status: DISCONTINUED | OUTPATIENT
Start: 2017-09-11 | End: 2017-09-11

## 2017-09-11 RX ORDER — BUPIVACAINE HYDROCHLORIDE 5 MG/ML
INJECTION, SOLUTION EPIDURAL; INTRACAUDAL PRN
Status: DISCONTINUED | OUTPATIENT
Start: 2017-09-11 | End: 2017-09-11 | Stop reason: HOSPADM

## 2017-09-11 RX ORDER — CHLORHEXIDINE GLUCONATE 40 MG/ML
SOLUTION TOPICAL ONCE
Status: DISCONTINUED | OUTPATIENT
Start: 2017-09-11 | End: 2017-09-11 | Stop reason: HOSPADM

## 2017-09-11 RX ORDER — ONDANSETRON 2 MG/ML
4 INJECTION INTRAMUSCULAR; INTRAVENOUS EVERY 30 MIN PRN
Status: DISCONTINUED | OUTPATIENT
Start: 2017-09-11 | End: 2017-09-11 | Stop reason: HOSPADM

## 2017-09-11 RX ORDER — ACETAMINOPHEN 325 MG/1
650 TABLET ORAL
Status: DISCONTINUED | OUTPATIENT
Start: 2017-09-11 | End: 2017-09-11 | Stop reason: HOSPADM

## 2017-09-11 RX ORDER — ONDANSETRON 4 MG/1
4-8 TABLET, ORALLY DISINTEGRATING ORAL EVERY 8 HOURS PRN
Qty: 10 TABLET | Refills: 0 | Status: SHIPPED | OUTPATIENT
Start: 2017-09-11 | End: 2018-01-10

## 2017-09-11 RX ORDER — DEXAMETHASONE SODIUM PHOSPHATE 4 MG/ML
INJECTION, SOLUTION INTRA-ARTICULAR; INTRALESIONAL; INTRAMUSCULAR; INTRAVENOUS; SOFT TISSUE PRN
Status: DISCONTINUED | OUTPATIENT
Start: 2017-09-11 | End: 2017-09-11

## 2017-09-11 RX ORDER — AMOXICILLIN 250 MG
1-2 CAPSULE ORAL 2 TIMES DAILY
Qty: 15 TABLET | Refills: 0 | Status: SHIPPED | OUTPATIENT
Start: 2017-09-11 | End: 2018-01-10

## 2017-09-11 RX ORDER — TRAMADOL HYDROCHLORIDE 50 MG/1
50-100 TABLET ORAL EVERY 6 HOURS PRN
Qty: 20 TABLET | Refills: 0 | Status: SHIPPED | OUTPATIENT
Start: 2017-09-11 | End: 2018-01-10

## 2017-09-11 RX ORDER — NALOXONE HYDROCHLORIDE 0.4 MG/ML
.1-.4 INJECTION, SOLUTION INTRAMUSCULAR; INTRAVENOUS; SUBCUTANEOUS
Status: DISCONTINUED | OUTPATIENT
Start: 2017-09-11 | End: 2017-09-11 | Stop reason: HOSPADM

## 2017-09-11 RX ORDER — FENTANYL CITRATE 50 UG/ML
INJECTION, SOLUTION INTRAMUSCULAR; INTRAVENOUS PRN
Status: DISCONTINUED | OUTPATIENT
Start: 2017-09-11 | End: 2017-09-11

## 2017-09-11 RX ORDER — PROPOFOL 10 MG/ML
INJECTION, EMULSION INTRAVENOUS CONTINUOUS PRN
Status: DISCONTINUED | OUTPATIENT
Start: 2017-09-11 | End: 2017-09-11

## 2017-09-11 RX ORDER — ACETAMINOPHEN 10 MG/ML
1000 INJECTION, SOLUTION INTRAVENOUS ONCE
Status: COMPLETED | OUTPATIENT
Start: 2017-09-11 | End: 2017-09-11

## 2017-09-11 RX ORDER — ONDANSETRON 4 MG/1
4 TABLET, ORALLY DISINTEGRATING ORAL
Status: DISCONTINUED | OUTPATIENT
Start: 2017-09-11 | End: 2017-09-11 | Stop reason: HOSPADM

## 2017-09-11 RX ORDER — HYDROXYZINE HYDROCHLORIDE 25 MG/1
25 TABLET, FILM COATED ORAL
Status: COMPLETED | OUTPATIENT
Start: 2017-09-11 | End: 2017-09-11

## 2017-09-11 RX ORDER — LIDOCAINE HYDROCHLORIDE 10 MG/ML
INJECTION, SOLUTION INFILTRATION; PERINEURAL PRN
Status: DISCONTINUED | OUTPATIENT
Start: 2017-09-11 | End: 2017-09-11

## 2017-09-11 RX ORDER — PROPOFOL 10 MG/ML
INJECTION, EMULSION INTRAVENOUS PRN
Status: DISCONTINUED | OUTPATIENT
Start: 2017-09-11 | End: 2017-09-11

## 2017-09-11 RX ORDER — ACETAMINOPHEN 325 MG/1
650 TABLET ORAL EVERY 4 HOURS PRN
Qty: 60 TABLET | Refills: 0 | Status: SHIPPED | OUTPATIENT
Start: 2017-09-11 | End: 2018-01-10

## 2017-09-11 RX ORDER — ONDANSETRON 4 MG/1
4 TABLET, ORALLY DISINTEGRATING ORAL EVERY 30 MIN PRN
Status: DISCONTINUED | OUTPATIENT
Start: 2017-09-11 | End: 2017-09-11 | Stop reason: HOSPADM

## 2017-09-11 RX ORDER — HYDROXYZINE HYDROCHLORIDE 25 MG/1
25 TABLET, FILM COATED ORAL EVERY 6 HOURS PRN
Qty: 30 TABLET | Refills: 0 | Status: SHIPPED | OUTPATIENT
Start: 2017-09-11 | End: 2018-01-10

## 2017-09-11 RX ORDER — GLYCOPYRROLATE 0.2 MG/ML
INJECTION, SOLUTION INTRAMUSCULAR; INTRAVENOUS PRN
Status: DISCONTINUED | OUTPATIENT
Start: 2017-09-11 | End: 2017-09-11

## 2017-09-11 RX ORDER — HYDROMORPHONE HYDROCHLORIDE 1 MG/ML
.3-.5 INJECTION, SOLUTION INTRAMUSCULAR; INTRAVENOUS; SUBCUTANEOUS EVERY 10 MIN PRN
Status: DISCONTINUED | OUTPATIENT
Start: 2017-09-11 | End: 2017-09-11 | Stop reason: HOSPADM

## 2017-09-11 RX ORDER — CEFAZOLIN SODIUM 2 G/100ML
2 INJECTION, SOLUTION INTRAVENOUS
Status: COMPLETED | OUTPATIENT
Start: 2017-09-11 | End: 2017-09-11

## 2017-09-11 RX ORDER — TRAMADOL HYDROCHLORIDE 50 MG/1
50 TABLET ORAL ONCE
Status: COMPLETED | OUTPATIENT
Start: 2017-09-11 | End: 2017-09-11

## 2017-09-11 RX ORDER — SCOLOPAMINE TRANSDERMAL SYSTEM 1 MG/1
1 PATCH, EXTENDED RELEASE TRANSDERMAL ONCE
Status: DISCONTINUED | OUTPATIENT
Start: 2017-09-11 | End: 2017-09-11 | Stop reason: HOSPADM

## 2017-09-11 RX ORDER — LABETALOL HYDROCHLORIDE 5 MG/ML
10 INJECTION, SOLUTION INTRAVENOUS
Status: COMPLETED | OUTPATIENT
Start: 2017-09-11 | End: 2017-09-11

## 2017-09-11 RX ORDER — MAGNESIUM HYDROXIDE 1200 MG/15ML
LIQUID ORAL PRN
Status: DISCONTINUED | OUTPATIENT
Start: 2017-09-11 | End: 2017-09-11 | Stop reason: HOSPADM

## 2017-09-11 RX ORDER — CEFAZOLIN SODIUM 1 G/3ML
1 INJECTION, POWDER, FOR SOLUTION INTRAMUSCULAR; INTRAVENOUS SEE ADMIN INSTRUCTIONS
Status: DISCONTINUED | OUTPATIENT
Start: 2017-09-11 | End: 2017-09-11 | Stop reason: HOSPADM

## 2017-09-11 RX ADMIN — BUPIVACAINE HYDROCHLORIDE 5 ML: 5 INJECTION, SOLUTION EPIDURAL; INTRACAUDAL at 15:21

## 2017-09-11 RX ADMIN — GLYCOPYRROLATE 0.2 MG: 0.2 INJECTION, SOLUTION INTRAMUSCULAR; INTRAVENOUS at 14:46

## 2017-09-11 RX ADMIN — HYDROXYZINE HYDROCHLORIDE 25 MG: 25 TABLET ORAL at 17:10

## 2017-09-11 RX ADMIN — SODIUM CHLORIDE 100 ML: 900 IRRIGANT IRRIGATION at 15:22

## 2017-09-11 RX ADMIN — CEFAZOLIN SODIUM 2 G: 2 INJECTION, SOLUTION INTRAVENOUS at 14:26

## 2017-09-11 RX ADMIN — FENTANYL CITRATE 50 MCG: 50 INJECTION INTRAMUSCULAR; INTRAVENOUS at 15:39

## 2017-09-11 RX ADMIN — FENTANYL CITRATE 50 MCG: 50 INJECTION INTRAMUSCULAR; INTRAVENOUS at 15:49

## 2017-09-11 RX ADMIN — SODIUM CHLORIDE, POTASSIUM CHLORIDE, SODIUM LACTATE AND CALCIUM CHLORIDE: 600; 310; 30; 20 INJECTION, SOLUTION INTRAVENOUS at 14:26

## 2017-09-11 RX ADMIN — FENTANYL CITRATE 100 MCG: 50 INJECTION, SOLUTION INTRAMUSCULAR; INTRAVENOUS at 14:32

## 2017-09-11 RX ADMIN — LABETALOL HYDROCHLORIDE 10 MG: 5 INJECTION, SOLUTION INTRAVENOUS at 14:53

## 2017-09-11 RX ADMIN — HYDROMORPHONE HYDROCHLORIDE 0.5 MG: 1 INJECTION, SOLUTION INTRAMUSCULAR; INTRAVENOUS; SUBCUTANEOUS at 16:26

## 2017-09-11 RX ADMIN — PROPOFOL 75 MCG/KG/MIN: 10 INJECTION, EMULSION INTRAVENOUS at 14:40

## 2017-09-11 RX ADMIN — LIDOCAINE HYDROCHLORIDE 20 MG: 10 INJECTION, SOLUTION INFILTRATION; PERINEURAL at 14:32

## 2017-09-11 RX ADMIN — KETOROLAC TROMETHAMINE 30 MG: 30 INJECTION, SOLUTION INTRAMUSCULAR at 14:50

## 2017-09-11 RX ADMIN — TRAMADOL HYDROCHLORIDE 50 MG: 50 TABLET, COATED ORAL at 17:09

## 2017-09-11 RX ADMIN — ACETAMINOPHEN 1000 MG: 10 INJECTION, SOLUTION INTRAVENOUS at 16:48

## 2017-09-11 RX ADMIN — DEXAMETHASONE SODIUM PHOSPHATE 8 MG: 4 INJECTION, SOLUTION INTRA-ARTICULAR; INTRALESIONAL; INTRAMUSCULAR; INTRAVENOUS; SOFT TISSUE at 14:32

## 2017-09-11 RX ADMIN — SODIUM CHLORIDE, POTASSIUM CHLORIDE, SODIUM LACTATE AND CALCIUM CHLORIDE: 600; 310; 30; 20 INJECTION, SOLUTION INTRAVENOUS at 16:31

## 2017-09-11 RX ADMIN — KETAMINE HCL-NACL SOLN PREF SY 50 MG/5ML-0.9% (10MG/ML) 30 MG: 10 SOLUTION PREFILLED SYRINGE at 14:46

## 2017-09-11 RX ADMIN — FENTANYL CITRATE 50 MCG: 50 INJECTION INTRAMUSCULAR; INTRAVENOUS at 16:40

## 2017-09-11 RX ADMIN — MIDAZOLAM HYDROCHLORIDE 2 MG: 1 INJECTION, SOLUTION INTRAMUSCULAR; INTRAVENOUS at 14:26

## 2017-09-11 RX ADMIN — LABETALOL HYDROCHLORIDE 10 MG: 5 INJECTION, SOLUTION INTRAVENOUS at 17:26

## 2017-09-11 RX ADMIN — LABETALOL HYDROCHLORIDE 10 MG: 5 INJECTION, SOLUTION INTRAVENOUS at 14:50

## 2017-09-11 RX ADMIN — PROPOFOL 150 MG: 10 INJECTION, EMULSION INTRAVENOUS at 14:32

## 2017-09-11 RX ADMIN — ONDANSETRON 4 MG: 2 INJECTION INTRAMUSCULAR; INTRAVENOUS at 14:42

## 2017-09-11 NOTE — ANESTHESIA CARE TRANSFER NOTE
Patient: Desire Moise    Procedure(s):  ORIF RIGHT DISTAL RADIUS - Wound Class: I-Clean    Diagnosis: distal radius fracture,right  Diagnosis Additional Information: No value filed.    Anesthesia Type:   General, LMA     Note:  Airway :Face Mask  Patient transferred to:PACU  Comments: VSS.  Spontaneously breathing O2 per open face mask.  Report given to RN.      Vitals: (Last set prior to Anesthesia Care Transfer)    CRNA VITALS  9/11/2017 1502 - 9/11/2017 1537      9/11/2017             Resp Rate (observed): (!)  3                Electronically Signed By: CHAKA Pelayo CRNA  September 11, 2017  3:37 PM

## 2017-09-11 NOTE — ANESTHESIA POSTPROCEDURE EVALUATION
Patient: Desire Moise    Procedure(s):  ORIF RIGHT DISTAL RADIUS - Wound Class: I-Clean    Diagnosis:distal radius fracture,right  Diagnosis Additional Information: distal radius fracture,right    Anesthesia Type:  General, LMA    Note:  Anesthesia Post Evaluation    Patient location during evaluation: PACU  Patient participation: Able to fully participate in evaluation  Level of consciousness: awake and alert  Pain management: adequate  Airway patency: patent  Cardiovascular status: acceptable  Respiratory status: acceptable  Hydration status: acceptable  PONV: none     Anesthetic complications: None          Last vitals:  Vitals:    09/11/17 1540 09/11/17 1545 09/11/17 1600   BP: (!) 143/91 (!) 146/93 (!) 156/94   Resp: 14 15 15   Temp:      SpO2: 100% 100% 100%         Electronically Signed By: Braden Gonzalez MD  September 11, 2017  4:27 PM

## 2017-09-11 NOTE — IP AVS SNAPSHOT
MRN:[id number]                      After Visit Summary   9/11/2017    Desire Moise    MRN: [id number]           Thank you!     Thank you for choosing [facility] for your care. Our goal is always to provide you with excellent care. Hearing back from our patients is one way we can continue to improve our services. Please take a few minutes to complete the written survey that you may receive in the mail after you visit. If you would like to speak to someone directly about your visit please contact Patient Relations at [phone]. Thank you!          Patient Information     Date Of Birth          1960        About your hospital stay     You were admitted on:  September 11, 2017 You last received care in the:  [facility] PreOP/PostOP    You were discharged on:  September 11, 2017       Who to Call     For medical emergencies, please call 911.  For non-urgent questions about your medical care, please call your primary care provider or clinic, [phone]  For questions related to your surgery, please call your surgery clinic        Attending Provider     Provider Specialty    Santi Villarreal MD Orthopedics       Primary Care Provider Office Phone # Fax #    Matilde Christopher [phone]      After Care Instructions      Diet as Tolerated       Return to diet before surgery, unless instructed otherwise.            Discharge Instructions       Review outpatient procedure discharge instructions with patient as directed by Provider            Ice to affected area       Ice pack to surgical site every 15 minutes per hour for 24 hours            No weight bearing       No lifting with the right arm.  Okay to write and type.            Notify Provider       For signs and symptoms of infection: Fever greater than 101, redness, swelling, heat at site, drainage, pus.            Remove dressing - at 48 hours       Then okay to get incision wet in the shower.  No  soaking/bathing for 2 weeks.  Brace on at all times except for hygiene purposes for the 1st 2 weeks.  Work on finger range of motion.            Return to clinic       Return to clinic in 2 weeks                  Further instructions from your care team       GENERAL ANESTHESIA OR SEDATION ADULT DISCHARGE INSTRUCTIONS   SPECIAL PRECAUTIONS FOR 24 HOURS AFTER SURGERY    IT IS NOT UNUSUAL TO FEEL LIGHT-HEADED OR FAINT, UP TO 24 HOURS AFTER SURGERY OR WHILE TAKING PAIN MEDICATION.  IF YOU HAVE THESE SYMPTOMS; SIT FOR A FEW MINUTES BEFORE STANDING AND HAVE SOMEONE ASSIST YOU WHEN YOU GET UP TO WALK OR USE THE BATHROOM.    YOU SHOULD REST AND RELAX FOR THE NEXT 24 HOURS AND YOU MUST MAKE ARRANGEMENTS TO HAVE SOMEONE STAY WITH YOU FOR AT LEAST 24 HOURS AFTER YOUR DISCHARGE.  AVOID HAZARDOUS AND STRENUOUS ACTIVITIES.  DO NOT MAKE IMPORTANT DECISIONS FOR 24 HOURS.    DO NOT DRIVE ANY VEHICLE OR OPERATE MECHANICAL EQUIPMENT FOR 24 HOURS FOLLOWING THE END OF YOUR SURGERY.  EVEN THOUGH YOU MAY FEEL NORMAL, YOUR REACTIONS MAY BE AFFECTED BY THE MEDICATION YOU HAVE RECEIVED.    DO NOT DRINK ALCOHOLIC BEVERAGES FOR 24 HOURS FOLLOWING YOUR SURGERY.    DRINK CLEAR LIQUIDS (APPLE JUICE, GINGER ALE, 7-UP, BROTH, ETC.).  PROGRESS TO YOUR REGULAR DIET AS YOU FEEL ABLE.    YOU MAY HAVE A DRY MOUTH, A SORE THROAT, MUSCLES ACHES OR TROUBLE SLEEPING.  THESE SHOULD GO AWAY AFTER 24 HOURS.    CALL YOUR DOCTOR FOR ANY OF THE FOLLOWING:  SIGNS OF INFECTION (FEVER, GROWING TENDERNESS AT THE SURGERY SITE, A LARGE AMOUNT OF DRAINAGE OR BLEEDING, SEVERE PAIN, FOUL-SMELLING DRAINAGE, REDNESS OR SWELLING.    IT HAS BEEN OVER 8 TO 10 HOURS SINCE SURGERY AND YOU ARE STILL NOT ABLE TO URINATE (PASS WATER).     HAND OR WRIST SURGERY DISCHARGE INSTRUCTIONS  DR. MEKA ASHRAF M.D.  [phone]    PAIN  You may have numbing medication in your incisional area.  As this medication wears off you can take the pills prescribed for you.  Elevate your arm for the  "first 24 hours to reduce swelling and pain.  Use ice as needed.    DRESSINGS  Keep your dressing dry and intact as instructed.  Cover it with plastic wrap or plastic bag to shower.    ACTIVITIES  After the surgery begin moving your fingers immediately.  You may use your hand for light activities and driving.  You may also return to work for light duty or as discussed with your doctor.  No heavy lifting, pushing or pulling with your surgical hand.    FOLLOW UP  If a follow up appointment was not made for you, call the office as soon as possible after your surgery to schedule a follow up appointment for 10-14 days post operatively.  You can be seen at our office at [facility] at [address] in [place].  Call [phone] for your follow up appointment.    CALL OUR OFFICE -[phone] IF:  You develop a fever of 101 degrees or above.  You develop redness, swelling or drainage around the incisional area.  You have problems such as rash or continued nausea and/or vomiting with medication.  You have any questions, problems or concerns.    For emergencies after 5 pm, call the on-call physician at [phone]    Maximum acetaminophen (Tylenol) dose from all sources should not exceed 4 grams (4000 mg) per day.            Pending Results     No orders found from 9/9/2017 to 9/12/2017.            Admission Information     Date & Time Provider Department Dept. Phone    9/11/2017 Santi Villarreal MD [facility] PreOP/PostOP [phone]      Your Vitals Were     Blood Pressure Temperature Respirations Height Weight Last Period    146/84 98.3  F (36.8  C) (Temporal) 22 1.676 m (5' 6\") 66.2 kg (146 lb) 06/09/2003    Pulse Oximetry BMI (Body Mass Index)                97% 23.57 kg/m2          [employer] Information     [employer] lets you send messages to your doctor, view your test results, renew your prescriptions, schedule appointments and more. To sign up, go to [email or web address] . Click on " "\"Log in\" on the left side of the screen, which will take you to the Welcome page. Then click on \"Sign up Now\" on the right side of the page.     You will be asked to enter the access code listed below, as well as some personal information. Please follow the directions to create your username and password.     Your access code is: [email or web address]  Expires: 2017  5:31 PM     Your access code will  in 90 days. If you need help or a new code, please call your [facility] clinic or [phone].        Care EveryWhere ID     This is your Care EveryWhere ID. This could be used by other organizations to access your [facility] medical records  [id number]        Equal Access to Services     FRANK ALBERT : Dc Moses, will linn, sabrina ruiz, navin mackey. So [facility] [phone].    ATENCIÓN: Si habla español, tiene a manzo disposición servicios gratuitos de asistencia lingüística. [facility] [phone].    We comply with applicable federal civil rights laws and [place] laws. We do not discriminate on the basis of race, color, national origin, age, disability sex, sexual orientation or gender identity.               Review of your medicines      START taking        Dose / Directions    acetaminophen 325 MG tablet   Commonly known as:  TYLENOL   Used for:  Closed Colles' fracture of right radius, initial encounter        Dose:  650 mg   Take 2 tablets (650 mg) by mouth every 4 hours as needed for other (mild pain)   Quantity:  60 tablet   Refills:  0       hydrOXYzine 25 MG tablet   Commonly known as:  ATARAX   Used for:  Closed Colles' fracture of right radius, initial encounter        Dose:  25 mg   Take 1 tablet (25 mg) by mouth every 6 hours as needed for itching (and nausea)   Quantity:  30 tablet   Refills:  0       senna-docusate 8.6-50 MG per tablet   Commonly known as:  SENOKOT-S;PERICOLACE   Used for:  Closed Colles' fracture of right radius, initial " encounter        Dose:  1-2 tablet   Take 1-2 tablets by mouth 2 times daily Take while on oral narcotics to prevent or treat constipation.   Quantity:  15 tablet   Refills:  0       traMADol 50 MG tablet   Commonly known as:  ULTRAM   Used for:  Closed Colles' fracture of right radius, initial encounter        Dose:   mg   Take 1-2 tablets ( mg) by mouth every 6 hours as needed for pain maximum 10 tablet(s) per day   Quantity:  20 tablet   Refills:  0         CONTINUE these medicines which may have CHANGED, or have new prescriptions. If we are uncertain of the size of tablets/capsules you have at home, strength may be listed as something that might have changed.        Dose / Directions    ondansetron 4 MG ODT tab   Commonly known as:  ZOFRAN-ODT   This may have changed:    - how much to take  - additional instructions   Used for:  Closed Colles' fracture of right radius, initial encounter        Dose:  4-8 mg   Take 1-2 tablets (4-8 mg) by mouth every 8 hours as needed for nausea Dissolve ON the tongue.   Quantity:  10 tablet   Refills:  0         STOP taking     HYDROcodone-acetaminophen 5-325 MG per tablet   Commonly known as:  NORCO                Where to get your medicines      These medications were sent to [facility] [place], [place] - [address] [facility]  [address] [facility] [address]     Phone:  [phone]     acetaminophen 325 MG tablet    hydrOXYzine 25 MG tablet    ondansetron 4 MG ODT tab    senna-docusate 8.6-50 MG per tablet         Some of these will need a paper prescription and others can be bought over the counter. Ask your nurse if you have questions.     Bring a paper prescription for each of these medications     traMADol 50 MG tablet                Protect others around you: Learn how to safely use, store and throw away your medicines at www.disposemymeds.org.             Medication List: This is a list of all your medications and when to take  them. Check marks below indicate your daily home schedule. Keep this list as a reference.      Medications           Morning Afternoon Evening Bedtime As Needed    acetaminophen 325 MG tablet   Commonly known as:  TYLENOL   Take 2 tablets (650 mg) by mouth every 4 hours as needed for other (mild pain)                                hydrOXYzine 25 MG tablet   Commonly known as:  ATARAX   Take 1 tablet (25 mg) by mouth every 6 hours as needed for itching (and nausea)   Last time this was given:  25 mg on 9/11/2017  5:10 PM                                ondansetron 4 MG ODT tab   Commonly known as:  ZOFRAN-ODT   Take 1-2 tablets (4-8 mg) by mouth every 8 hours as needed for nausea Dissolve ON the tongue.                                senna-docusate 8.6-50 MG per tablet   Commonly known as:  SENOKOT-S;PERICOLACE   Take 1-2 tablets by mouth 2 times daily Take while on oral narcotics to prevent or treat constipation.                                traMADol 50 MG tablet   Commonly known as:  ULTRAM   Take 1-2 tablets ( mg) by mouth every 6 hours as needed for pain maximum 10 tablet(s) per day   Last time this was given:  50 mg on 9/11/2017  5:09 PM

## 2017-09-11 NOTE — ANESTHESIA PREPROCEDURE EVALUATION
Anesthesia Evaluation     . Pt has had prior anesthetic. Type: General    No history of anesthetic complications          ROS/MED HX    ENT/Pulmonary:  - neg pulmonary ROS     Neurologic:  - neg neurologic ROS     Cardiovascular:  - neg cardiovascular ROS       METS/Exercise Tolerance:     Hematologic:  - neg hematologic  ROS       Musculoskeletal:   (+) fracture upper extremity: Ulnar: Radius, , -       GI/Hepatic:  - neg GI/hepatic ROS       Renal/Genitourinary:  - ROS Renal section negative       Endo:  - neg endo ROS       Psychiatric:  - neg psychiatric ROS       Infectious Disease:  - neg infectious disease ROS       Malignancy:         Other:    - neg other ROS                 Physical Exam  Normal systems: cardiovascular, pulmonary and dental    Airway   Mallampati: II  TM distance: >3 FB  Neck ROM: full    Dental     Cardiovascular       Pulmonary                     Anesthesia Plan      History & Physical Review  History and physical reviewed and following examination; no interval change.    ASA Status:  1 .    NPO Status:  > 8 hours    Plan for General and LMA with Intravenous induction. Maintenance will be Balanced.    PONV prophylaxis:  Ondansetron (or other 5HT-3) and Dexamethasone or Solumedrol       Postoperative Care  Postoperative pain management:  Oral pain medications and IV analgesics.      Consents  Anesthetic plan, risks, benefits and alternatives discussed with:  Patient..                          .

## 2017-09-11 NOTE — DISCHARGE INSTRUCTIONS
GENERAL ANESTHESIA OR SEDATION ADULT DISCHARGE INSTRUCTIONS   SPECIAL PRECAUTIONS FOR 24 HOURS AFTER SURGERY    IT IS NOT UNUSUAL TO FEEL LIGHT-HEADED OR FAINT, UP TO 24 HOURS AFTER SURGERY OR WHILE TAKING PAIN MEDICATION.  IF YOU HAVE THESE SYMPTOMS; SIT FOR A FEW MINUTES BEFORE STANDING AND HAVE SOMEONE ASSIST YOU WHEN YOU GET UP TO WALK OR USE THE BATHROOM.    YOU SHOULD REST AND RELAX FOR THE NEXT 24 HOURS AND YOU MUST MAKE ARRANGEMENTS TO HAVE SOMEONE STAY WITH YOU FOR AT LEAST 24 HOURS AFTER YOUR DISCHARGE.  AVOID HAZARDOUS AND STRENUOUS ACTIVITIES.  DO NOT MAKE IMPORTANT DECISIONS FOR 24 HOURS.    DO NOT DRIVE ANY VEHICLE OR OPERATE MECHANICAL EQUIPMENT FOR 24 HOURS FOLLOWING THE END OF YOUR SURGERY.  EVEN THOUGH YOU MAY FEEL NORMAL, YOUR REACTIONS MAY BE AFFECTED BY THE MEDICATION YOU HAVE RECEIVED.    DO NOT DRINK ALCOHOLIC BEVERAGES FOR 24 HOURS FOLLOWING YOUR SURGERY.    DRINK CLEAR LIQUIDS (APPLE JUICE, GINGER ALE, 7-UP, BROTH, ETC.).  PROGRESS TO YOUR REGULAR DIET AS YOU FEEL ABLE.    YOU MAY HAVE A DRY MOUTH, A SORE THROAT, MUSCLES ACHES OR TROUBLE SLEEPING.  THESE SHOULD GO AWAY AFTER 24 HOURS.    CALL YOUR DOCTOR FOR ANY OF THE FOLLOWING:  SIGNS OF INFECTION (FEVER, GROWING TENDERNESS AT THE SURGERY SITE, A LARGE AMOUNT OF DRAINAGE OR BLEEDING, SEVERE PAIN, FOUL-SMELLING DRAINAGE, REDNESS OR SWELLING.    IT HAS BEEN OVER 8 TO 10 HOURS SINCE SURGERY AND YOU ARE STILL NOT ABLE TO URINATE (PASS WATER).     HAND OR WRIST SURGERY DISCHARGE INSTRUCTIONS  DR. MEKA ASHRAF M.D.  [phone]    PAIN  You may have numbing medication in your incisional area.  As this medication wears off you can take the pills prescribed for you.  Elevate your arm for the first 24 hours to reduce swelling and pain.  Use ice as needed.    DRESSINGS  Keep your dressing dry and intact as instructed.  Cover it with plastic wrap or plastic bag to shower.    ACTIVITIES  After the surgery begin moving your fingers immediately.  You  may use your hand for light activities and driving.  You may also return to work for light duty or as discussed with your doctor.  No heavy lifting, pushing or pulling with your surgical hand.    FOLLOW UP  If a follow up appointment was not made for you, call the office as soon as possible after your surgery to schedule a follow up appointment for 10-14 days post operatively.  You can be seen at our office at [facility] at [address] in [place].  Call [phone] for your follow up appointment.    CALL OUR OFFICE -[phone] IF:  You develop a fever of 101 degrees or above.  You develop redness, swelling or drainage around the incisional area.  You have problems such as rash or continued nausea and/or vomiting with medication.  You have any questions, problems or concerns.    For emergencies after 5 pm, call the on-call physician at [phone]    Maximum acetaminophen (Tylenol) dose from all sources should not exceed 4 grams (4000 mg) per day.

## 2017-09-11 NOTE — IP AVS SNAPSHOT
[facility] PreOP/PostOP    [address]    [facility] [address]    Phone:  [phone]    Fax:  [phone]                                       After Visit Summary   9/11/2017    Desire Moise    MRN: [id number]           After Visit Summary Signature Page     I have received my discharge instructions, and my questions have been answered. I have discussed any challenges I see with this plan with the nurse or doctor.    ..........................................................................................................................................  Patient/Patient Representative Signature      ..........................................................................................................................................  Patient Representative Print Name and Relationship to Patient    ..................................................               ................................................  Date                                            Time    ..........................................................................................................................................  Reviewed by Signature/Title    ...................................................              ..............................................  Date                                                            Time

## 2017-09-12 NOTE — OP NOTE
DATE OF PROCEDURE:  09/11/2017      PREOPERATIVE DIAGNOSIS:  Right intra-articular displaced distal radius fracture.      POSTOPERATIVE DIAGNOSIS:  Right intra-articular displaced distal radius fracture.      PROCEDURES:  Open reduction and internal fixation right intra-articular displaced distal radius fracture.      SURGEON:  Santi Villarreal MD      ASSISTANT:  Rebel Hall PA-C      ANESTHESIA:  General.      ESTIMATED BLOOD LOSS:  Less than 5 mL.      INTRAOPERATIVE COMPLICATIONS:  None apparent.      OPERATIVE INDICATIONS:  Desire Moise is a 57-year-old right-hand dominant female who fell off a horse and sustained a right distal radius fracture.  There was significant apex volar angulation across the fracture and intra-articular extension with minimal intra-articular displacement.  Given the displacement and this being her dominant hand, it was felt that she would benefit from formal open reduction and internal fixation.  Risks, benefits and alternatives were discussed at length and she elected to proceed.      DESCRIPTION OF PROCEDURE:  The patient was identified in the preoperative holding area and the operative site was marked.  Consent was again reviewed and all questions were answered.  She was taken to the operating room, placed under general anesthesia with the right upper extremity prepped and draped in the usual sterile fashion.  Preoperative antibiotics were administered.  A timeout was called to ensure the correct operative site and procedure.  An Esmarch was used to exsanguinate the extremity and the tourniquet was inflated to 250 mmHg.  A longitudinal incision was made along the volar wrist centered over the FCR tendon with sharp dissection down through the skin and subcutaneous tissues.  Further dissection was carried down through the floor of the tendon sheath and blunt dissection was carried down onto the pronator quadratus.  This was elevated as an ulnar based flap and the fracture site was  exposed.  A Fitzwilliam elevator was used to bring the fracture out to length and restore volar tilt.  The [employer] narrow distal radius plate was selected and placed along the volar cortex and provisionally pinned.  Once this confirmed to be aligned under fluoroscopy a distal bicortical nonlocking screw was placed to draw the plate down onto the bone.  Once the plate was felt to be appropriately positioned the remainder of the distal locking holes were filled with smooth locking pegs.  The bicortical screw was also replaced with a smooth locking peg.  Three bicortical nonlocking 3.5 mm screws were placed through the metaphyseal holes.  Final images then showed restoration of the volar tilt with appropriate position of the plate and screws within the subchondral bone.  There was no crepitus through wrist range of motion and the DRUJ was felt to be stable.  The wound was copiously irrigated.  The pronator quadratus was closed with 2-0 Vicryl sutures, subcutaneous tissues with 3-0 Vicryl, skin with a running Monocryl.  Sterile dressings and a brace were applied.  The patient was then awoken from general anesthesia and transferred to postanesthesia care unit in stable condition.      Rebel Hall PA-C was present and scrubbed throughout the case and his assistance was critical for patient positioning, assistance with prepping, draping, soft tissue retraction, wound closure, dressing and splint application.         MEKA ASHRAF MD             D: 2017 15:32   T: 2017 22:11   MT: [id number]      Name:     MIGUEL LACKEY   MRN:      [id number]        Account:        [id number]   :      1960           Procedure Date: 2017      Document: [id number]

## 2017-09-13 ENCOUNTER — HOSPITAL ENCOUNTER (EMERGENCY)
Facility: CLINIC | Age: 57
Discharge: HOME | End: 2017-09-13
Attending: EMERGENCY MEDICINE | Admitting: EMERGENCY MEDICINE
Payer: COMMERCIAL

## 2017-09-13 ENCOUNTER — NURSE TRIAGE (OUTPATIENT)
Dept: NURSING | Facility: CLINIC | Age: 57
End: 2017-09-13

## 2017-09-13 VITALS
BODY MASS INDEX: 23.46 KG/M2 | SYSTOLIC BLOOD PRESSURE: 165 MMHG | HEIGHT: 66 IN | OXYGEN SATURATION: 95 % | WEIGHT: 146 LBS | RESPIRATION RATE: 16 BRPM | TEMPERATURE: 98 F | DIASTOLIC BLOOD PRESSURE: 97 MMHG

## 2017-09-13 DIAGNOSIS — I10 ESSENTIAL HYPERTENSION: ICD-10-CM

## 2017-09-13 DIAGNOSIS — G89.18 ACUTE POSTOPERATIVE PAIN: ICD-10-CM

## 2017-09-13 PROCEDURE — 99282 EMERGENCY DEPT VISIT SF MDM: CPT

## 2017-09-13 ASSESSMENT — ENCOUNTER SYMPTOMS: HEADACHES: 0

## 2017-09-13 NOTE — ED PROVIDER NOTES
History     Chief Complaint:  Post-operative Pain    HPI   Desire Moise is a 57 year old female who presents to the emergency department today for evaluation of post-operative pain. Per review of records, the patient had surgery on her right wrist performed yesterday. The patient reports that she has uncontrolled post-operative pain despite ibuprofen and tylenol use. She notes that she was prescribed Tramadol following her surgery but is concerned about taking it due to a history of benzodiazepine addiction (ativan). She states that she did have one dose of Tramadol immediately following her surgery. She reports that she checked her blood pressure at home this evening because she thought it felt high and was found to be hypertensive. She notes that she has a history of hypertension in the setting of uncontrolled pain. She denies headache or chest pain.    Allergies:  No Known Drug Allergies     Medications:    Acetaminophen  Senna-docusate  Zofran  Atarax  Tramadol    Past Medical History:    Diverticulosis  Spina bifida occulta  Anemia    Past Surgical History:    Hysterectomy  Incision of hymen  Open reduction internal fixation wrist, right  Ringoes teeth surgery    Family History:    Father: Heart disease  Father: Lipids  Maternal Grandmother: Breast cancer    Social History:  The patient was accompanied to the ED by her .  Smoking Status: Never Smoker  Smokeless Tobacco: Never Used  Alcohol Use: Negative  Marital Status:       Review of Systems   Cardiovascular: Negative for chest pain.   Musculoskeletal:        Positive for right wrist pain.   Neurological: Negative for headaches.   All other systems reviewed and are negative.    Physical Exam     Vitals:  Patient Vitals for the past 24 hrs:   BP Temp Temp src Heart Rate Resp SpO2 Height Weight   09/13/17 0313 (!) 165/97 - - - - 95 % - -   09/13/17 0300 - - - - - 96 % - -   09/13/17 0259 (!) 164/110 - - - - - - -   09/13/17 0217 - 98  F (36.7  " C) Temporal - - - 1.676 m (5' 6\") 66.2 kg (146 lb)   09/13/17 0214 - - - - - 98 % - -   09/13/17 0213 (!) 166/108 - - 68 16 99 % - -     Physical Exam  General: Patient is alert and cooperative.  Neck: Normal range of motion.   Cardiovascular: Normal rate.  Pulmonary/Chest: Effort normal.  Musculoskeletal: LUE in clean, dry dressing/ace bandage and splinted.  Normal appearing, well perfused fingers.  Neurological: Patient  is alert and oriented.   Skin: pink fingers.  Psychiatric: Patient has a normal mood and affect. Normal behavior and judgement.    Emergency Department Course     Emergency Department Course:  Nursing notes and vitals reviewed.  I performed an exam of the patient as documented above.   I discussed the treatment plan with the patient. They expressed understanding of this plan and consented to discharge. They will be discharged home with instructions for care and follow up. In addition, the patient will return to the emergency department if their symptoms persist, worsen, if new symptoms arise or if there is any concern.  All questions were answered.    Impression & Plan      Medical Decision Making:  Desire Moise is a 57 year old female who presents with complaints of poorly controlled post-operative right wrist pain and concern for elevated blood pressure. She has no history of hypertension but in the past, with pain, has had elevated blood pressure readings. She has no symptoms such as a headache or chest discomfort attributable to her blood pressure, which is in the 160/90 range and does not require intervention. With respect to the wrist pain, she was offered but declined opioid analgesic or tramadol, the latter of which she was prescribed and not used by her surgeon. Reassurance was given there is no need for blood pressure intervention and she can follow up with her primary clinic as needed should that remain elevated after her post-operative pain improves.    Diagnosis:  1. Post-operative " right wrist pain  2. Slightly elevated blood pressure secondary to 1    Disposition:  The patient is discharged to home.    Scribe Disclosure:  I, Emiliano Sung, am serving as a scribe at 3:11 AM on 9/13/2017 to document services personally performed by Kolton Jarvis MD based on my observations and the provider's statements to me.  [facility] EMERGENCY DEPARTMENT       Kolton Jarvis MD  09/13/17 1949

## 2017-09-13 NOTE — TELEPHONE ENCOUNTER
"  Reason for Disposition    [1] SEVERE pain AND [2] not improved 2 hours after pain medicine    Additional Information    Negative: Shock suspected (e.g., cold/pale/clammy skin, too weak to stand, low BP, rapid pulse)    Negative: [1] Similar pain previously AND [2] it was from \"heart attack\"    Negative: [1] Similar pain previously AND [2] it was from \"angina\" AND [3] not relieved by nitroglycerin    Negative: Sounds like a life-threatening emergency to the triager    Negative: Difficulty breathing or unusual sweating (e.g., sweating without exertion)    Negative: [1] Age > 40 AND [2] associated chest or jaw pain AND [3] pain lasts > 5 minutes    Negative: [1] Age > 40 AND [2] no obvious cause AND [3] pain even when not moving the arm    (Exception: pain is clearly made worse by moving arm or bending neck)    Negative: Followed an arm injury    Negative: Chest pain    Negative: Pain, redness, or swelling at intravenous (IV) site or along course of vein    Negative: Wound looks infected    Negative: Elbow pain is main symptom    Negative: Wrist pain is main symptom    Answer Assessment - Initial Assessment Questions  1. ONSET: \"When did the pain start?\"      ORIF wrist fracture  2. LOCATION: \"Where is the pain located?\"      wrist  3. PAIN: \"How bad is the pain?\" (Scale 1-10; or mild, moderate, severe)    - MILD (1-3): doesn't interfere with normal activities    - MODERATE (4-7): interferes with normal activities (e.g., work or school) or awakens from sleep    - SEVERE (8-10): excruciating pain, unable to do any normal activities, unable to hold a cup of water      Moderate-severe  4. WORK OR EXERCISE: \"Has there been any recent work or exercise that involved this part of the body?\"      no  5. CAUSE: \"What do you think is causing the arm pain?\"      Wrist fracture, ORIF  6. OTHER SYMPTOMS: \"Do you have any other symptoms?\" (e.g., neck pain, swelling, rash, fever, numbness, weakness)      no  7. PREGNANCY: \"Is there " "any chance you are pregnant?\" \"When was your last menstrual period?\"      no    Protocols used: ARM PAIN-ADULT-AH    "

## 2017-09-13 NOTE — ED NOTES
"Pt and spouse report that pt has had problems being \"addicted to narcotics in the past\" and has been concerned with taking Tramadol at home.  Pt has taken tylenol throughout the day and took Ibuprofen at approx 2300 tonight.  Reviewed tramadol with patient and also let MD know of her concerns.  "

## 2017-09-13 NOTE — ED AVS SNAPSHOT
[facility] Emergency Department    [address]    [facility] [address]    Phone:  [phone]    Fax:  [phone]                                       Desire Moise   MRN: [id number]    Department:  [facility] Emergency Department   Date of Visit:  9/13/2017           After Visit Summary Signature Page     I have received my discharge instructions, and my questions have been answered. I have discussed any challenges I see with this plan with the nurse or doctor.    ..........................................................................................................................................  Patient/Patient Representative Signature      ..........................................................................................................................................  Patient Representative Print Name and Relationship to Patient    ..................................................               ................................................  Date                                            Time    ..........................................................................................................................................  Reviewed by Signature/Title    ...................................................              ..............................................  Date                                                            Time

## 2017-09-13 NOTE — ED NOTES
"Patient states having sx to R wrist yesterday. Has been taking tylenol, and ibuprofen. Reports being prescribed tramdol states, \"I didn't take any of the medications because I am very sensitive to medications. And I have been taking tylenol and ibuprofen and it has not been helping with the pain.\"   Color WNL   "

## 2017-09-13 NOTE — ED NOTES
RN in to review discharge instructions with pt. Pt gown on bed and no pt present in room. Pt left prior to receiving D/C instructions.

## 2017-09-13 NOTE — ED AVS SNAPSHOT
[facility] Emergency Department    [address] [place] [address]    Phone:  [phone]    Fax:  [phone]                                       Desire Moise   MRN: [id number]    Department:  [facility] Emergency Department   Date of Visit:  9/13/2017           Patient Information     Date Of Birth          1960        Your diagnoses for this visit were:     Acute postoperative pain     Essential hypertension        You were seen by Kolton Jarvis MD.      Follow-up Information     Follow up with Matilde Christopher.    Specialty:  Internal Medicine    Why:  As needed, for re-evaluation of your blood pressure    Contact information:    [facility]  [address] [place] [address]  [phone]          Discharge Instructions       Discharge Instructions  Hypertension - High Blood Pressure    During you visit to the Emergency Department, your blood pressure was higher than the recommended blood pressure.  This may be related to stress, pain, medication or other temporary conditions. In these cases, your blood pressure may return to normal on its own. If you have a history of high blood pressure, you may need to have your doctor adjust your medications. Sometimes, your high measurement here may indicate that you have developed high blood pressure that will stay high unless it is treated. Sudden very high blood pressure can cause problems, but usually high blood pressure causes problems over months to years.      Blood pressure is almost never lowered in the Emergency Department, because studies have shown that lowering blood pressure too quickly is much more dangerous than leaving it alone.    You need to follow up with your doctor in 1-3 days to get your blood pressure rechecked.     Return to the Emergency Department if you start to have:    A severe headache.    Chest pain.    Shortness of breath.    Weakness or numbness that affects one  part of the body.    Confusion.    Vision changes.    Significant swelling of legs and/or eyes.    A reaction to any medication started in the Emergency Department.    What can I do to help myself?    Avoid alcohol.    Take any blood pressure medicine that you are prescribed.    Get a good night s sleep.    Lower your salt intake.    Exercise.    Lose weight.    Manage stress.    If blood pressure medication was started in the Emergency Department:    The medicine may not have an immediate effect. The body and brain determine what blood pressure you have. The medicine s job is to retrain the body s  thermostat  to a lower blood pressure.    You will need to follow up with your doctor to see how this medicine is working for you.  If you were given a prescription for medicine here today, be sure to read all of the information (including the package insert) that comes with your prescription.  This will include important information about the medicine, its side effects, and any warnings that you need to know about.  The pharmacist who fills the prescription can provide more information and answer questions you may have about the medicine.  If you have questions or concerns that the pharmacist cannot address, please call or return to the Emergency Department.       24 Hour Appointment Hotline       To make an appointment at any [facility], call [phone] ([phone]). If you don't have a family doctor or clinic, we will help you find one. [facility] are conveniently located to serve the needs of you and your family.             Review of your medicines      Our records show that you are taking the medicines listed below. If these are incorrect, please call your family doctor or clinic.        Dose / Directions Last dose taken    acetaminophen 325 MG tablet   Commonly known as:  TYLENOL   Dose:  650 mg   Quantity:  60 tablet        Take 2 tablets (650 mg) by mouth every 4 hours as needed for other  (mild pain)   Refills:  0        hydrOXYzine 25 MG tablet   Commonly known as:  ATARAX   Dose:  25 mg   Quantity:  30 tablet        Take 1 tablet (25 mg) by mouth every 6 hours as needed for itching (and nausea)   Refills:  0        ondansetron 4 MG ODT tab   Commonly known as:  ZOFRAN-ODT   Dose:  4-8 mg   Quantity:  10 tablet        Take 1-2 tablets (4-8 mg) by mouth every 8 hours as needed for nausea Dissolve ON the tongue.   Refills:  0        senna-docusate 8.6-50 MG per tablet   Commonly known as:  SENOKOT-S;PERICOLACE   Dose:  1-2 tablet   Quantity:  15 tablet        Take 1-2 tablets by mouth 2 times daily Take while on oral narcotics to prevent or treat constipation.   Refills:  0        traMADol 50 MG tablet   Commonly known as:  ULTRAM   Dose:   mg   Quantity:  20 tablet        Take 1-2 tablets ( mg) by mouth every 6 hours as needed for pain maximum 10 tablet(s) per day   Refills:  0                Orders Needing Specimen Collection     None      Pending Results     No orders found from 9/11/2017 to 9/14/2017.            Pending Culture Results     No orders found from 9/11/2017 to 9/14/2017.            Pending Results Instructions     If you had any lab results that were not finalized at the time of your Discharge, you can call the ED Lab Result RN at [phone]. You will be contacted by this team for any positive Lab results or changes in treatment. The nurses are available 7 days a week from 10A to 6:30P.  You can leave a message 24 hours per day and they will return your call.        Test Results From Your Hospital Stay               Clinical Quality Measure: Blood Pressure Screening     Your blood pressure was checked while you were in the emergency department today. The last reading we obtained was  BP: (!) 165/97 . Please read the guidelines below about what these numbers mean and what you should do about them.  If your systolic blood pressure (the top number) is less than 120 and your  "diastolic blood pressure (the bottom number) is less than 80, then your blood pressure is normal. There is nothing more that you need to do about it.  If your systolic blood pressure (the top number) is 120-139 or your diastolic blood pressure (the bottom number) is 80-89, your blood pressure may be higher than it should be. You should have your blood pressure rechecked within a year by a primary care provider.  If your systolic blood pressure (the top number) is 140 or greater or your diastolic blood pressure (the bottom number) is 90 or greater, you may have high blood pressure. High blood pressure is treatable, but if left untreated over time it can put you at risk for heart attack, stroke, or kidney failure. You should have your blood pressure rechecked by a primary care provider within the next 4 weeks.  If your provider in the emergency department today gave you specific instructions to follow-up with your doctor or provider even sooner than that, you should follow that instruction and not wait for up to 4 weeks for your follow-up visit.        Thank you for choosing [facility]       Thank you for choosing [facility] for your care. Our goal is always to provide you with excellent care. Hearing back from our patients is one way we can continue to improve our services. Please take a few minutes to complete the written survey that you may receive in the mail after you visit with us. Thank you!        [employer] Information     [facility] lets you send messages to your doctor, view your test results, renew your prescriptions, schedule appointments and more. To sign up, go to [email or web address] . Click on \"Log in\" on the left side of the screen, which will take you to the Welcome page. Then click on \"Sign up Now\" on the right side of the page.     You will be asked to enter the access code listed below, as well as some personal information. Please follow the directions to create your username and password.     Your " access code is: [email or web address]  Expires: 2017  5:31 PM     Your access code will  in 90 days. If you need help or a new code, please call your [facility] clinic or [phone].        Care EveryWhere ID     This is your Care EveryWhere ID. This could be used by other organizations to access your [facility] medical records  [id number]        Equal Access to Services     FARNK ALBERT : Hadii jamaal bondso Soalan, waaxda lulorenadaha, qaybta kaalmada adelynn, navin vance . So [facility] [phone].    ATENCIÓN: Si habla español, tiene a manzo disposición servicios gratuitos de asistencia lingüística. Llame al [phone].    We comply with applicable federal civil rights laws and [place] laws. We do not discriminate on the basis of race, color, national origin, age, disability sex, sexual orientation or gender identity.            After Visit Summary       This is your record. Keep this with you and show to your community pharmacist(s) and doctor(s) at your next visit.

## 2018-01-10 ENCOUNTER — APPOINTMENT (OUTPATIENT)
Dept: GENERAL RADIOLOGY | Facility: CLINIC | Age: 58
End: 2018-01-10
Attending: EMERGENCY MEDICINE
Payer: COMMERCIAL

## 2018-01-10 ENCOUNTER — HOSPITAL ENCOUNTER (EMERGENCY)
Facility: CLINIC | Age: 58
Discharge: HOME | End: 2018-01-10
Attending: EMERGENCY MEDICINE | Admitting: EMERGENCY MEDICINE
Payer: COMMERCIAL

## 2018-01-10 VITALS
SYSTOLIC BLOOD PRESSURE: 168 MMHG | DIASTOLIC BLOOD PRESSURE: 104 MMHG | RESPIRATION RATE: 16 BRPM | TEMPERATURE: 97.7 F | WEIGHT: 150 LBS | BODY MASS INDEX: 24.11 KG/M2 | HEART RATE: 66 BPM | HEIGHT: 66 IN | OXYGEN SATURATION: 96 %

## 2018-01-10 DIAGNOSIS — S22.32XA CLOSED FRACTURE OF ONE RIB OF LEFT SIDE, INITIAL ENCOUNTER: ICD-10-CM

## 2018-01-10 PROCEDURE — 71046 X-RAY EXAM CHEST 2 VIEWS: CPT

## 2018-01-10 PROCEDURE — 25000132 ZZH RX MED GY IP 250 OP 250 PS 637: Performed by: EMERGENCY MEDICINE

## 2018-01-10 PROCEDURE — 99283 EMERGENCY DEPT VISIT LOW MDM: CPT | Mod: 25

## 2018-01-10 RX ORDER — LIDOCAINE 4 G/G
1 PATCH TOPICAL ONCE
Status: DISCONTINUED | OUTPATIENT
Start: 2018-01-10 | End: 2018-01-10 | Stop reason: HOSPADM

## 2018-01-10 RX ORDER — LIDOCAINE 50 MG/G
1 PATCH TOPICAL EVERY 24 HOURS
Qty: 10 PATCH | Refills: 0 | Status: SHIPPED | OUTPATIENT
Start: 2018-01-10 | End: 2018-01-20

## 2018-01-10 RX ADMIN — LIDOCAINE 1 PATCH: 560 PATCH PERCUTANEOUS; TOPICAL; TRANSDERMAL at 07:59

## 2018-01-10 ASSESSMENT — ENCOUNTER SYMPTOMS
NAUSEA: 1
SLEEP DISTURBANCE: 1

## 2018-01-10 NOTE — ED NOTES
A&Ox4. ABC's intact. Pt c/o left rib pain after falling last week at a restaurant.  Pt has difficulty with deep breath and mvmt.  Also SOB.

## 2018-01-10 NOTE — DISCHARGE INSTRUCTIONS
Rib Contusion or Minor Fracture    A rib contusion is a bruise to one or more rib bones. It may cause pain, tenderness, swelling, and a purplish tint to the skin. There may be a sharp pain with each breath. A rib contusion takes anywhere from a few days to a few weeks to heal. A minor rib fracture or break may cause the same symptoms as a rib contusion. The small crack may not be seen on a regular chest X-ray. Treatment for both problems is the same.  Home care    You may use over-the-counter pain medicine to control pain, unless another pain medicine was prescribed. If you have chronic liver or kidney disease or ever had a stomach ulcer or GI bleeding, talk with your healthcare provider before using these medicines.    Rest. Do not lift anything heavy or do any activity that causes pain.    Apply an ice pack over the injured area for 15 to 20 minutes every 1 to 2 hours. You should do this for the first 24 to 48 hours. You can make an ice pack by filling a plastic bag that seals at the top with ice cubes and then wrapping it with a thin towel. Continue with ice packs as needed for the relief of pain and swelling.    The first 3 to 4 weeks of healing will be the most painful. If your pain is not under control with the treatment given, call your healthcare provider. Sometimes a stronger pain medicine may be needed. A nerve block can be done in case of severe pain. It will numb the nerve between the ribs.  Follow-up care  Follow up with your healthcare provider, or as advised.  If X-rays were taken, you will be told of any new findings that may affect your care.  Call 911  Call 911 if you have:    Dizziness, weakness or fainting    Shortness of breath with or without chest discomfort    New or worsening pain  When to seek medical advice  Call your healthcare provider right away if any of these occur:    Fever of 100.4 F (38 C) or above lasting for 24 to 48 hours    Stomach pain  Date Last Reviewed: 12/2/2015     3515-7947 [employer]. [address] [place] [address], [place] [address]. All rights reserved. This information is not intended as a substitute for professional medical care. Always follow your healthcare professional's instructions.

## 2018-01-10 NOTE — ED NOTES
Instructed on incentive spirometry, given an inspirometer and gave an excellent return demonstration after instruct.

## 2018-01-10 NOTE — ED AVS SNAPSHOT
[facility] Emergency Department    [address] [place] [address]    Phone:  [phone]    Fax:  [phone]                                       Desire Moise   MRN: [id number]    Department:  [facility] Emergency Department   Date of Visit:  1/10/2018           Patient Information     Date Of Birth          1960        Your diagnoses for this visit were:     Closed fracture of one rib of left side, initial encounter        You were seen by Marlon Lynn MD.      Follow-up Information     Follow up with Matilde Christopher In 5 days.    Specialty:  Internal Medicine    Why:  As needed    Contact information:    LILA NICOLLET [facility]  [address] [place] [address]  [phone]          Discharge Instructions         Rib Contusion or Minor Fracture    A rib contusion is a bruise to one or more rib bones. It may cause pain, tenderness, swelling, and a purplish tint to the skin. There may be a sharp pain with each breath. A rib contusion takes anywhere from a few days to a few weeks to heal. A minor rib fracture or break may cause the same symptoms as a rib contusion. The small crack may not be seen on a regular chest X-ray. Treatment for both problems is the same.  Home care    You may use over-the-counter pain medicine to control pain, unless another pain medicine was prescribed. If you have chronic liver or kidney disease or ever had a stomach ulcer or GI bleeding, talk with your healthcare provider before using these medicines.    Rest. Do not lift anything heavy or do any activity that causes pain.    Apply an ice pack over the injured area for 15 to 20 minutes every 1 to 2 hours. You should do this for the first 24 to 48 hours. You can make an ice pack by filling a plastic bag that seals at the top with ice cubes and then wrapping it with a thin towel. Continue with ice packs as needed for the relief of pain and swelling.    The first 3 to  4 weeks of healing will be the most painful. If your pain is not under control with the treatment given, call your healthcare provider. Sometimes a stronger pain medicine may be needed. A nerve block can be done in case of severe pain. It will numb the nerve between the ribs.  Follow-up care  Follow up with your healthcare provider, or as advised.  If X-rays were taken, you will be told of any new findings that may affect your care.  Call 911  Call 911 if you have:    Dizziness, weakness or fainting    Shortness of breath with or without chest discomfort    New or worsening pain  When to seek medical advice  Call your healthcare provider right away if any of these occur:    Fever of 100.4 F (38 C) or above lasting for 24 to 48 hours    Stomach pain  Date Last Reviewed: 12/2/2015 2000-2017 [employer]. [address] [place] [address]. All rights reserved. This information is not intended as a substitute for professional medical care. Always follow your healthcare professional's instructions.          24 Hour Appointment Hotline       To make an appointment at any [facility], call [phone] ([phone]). If you don't have a family doctor or clinic, we will help you find one. [place] [facility] clinics are conveniently located to serve the needs of you and your family.             Review of your medicines      START taking        Dose / Directions Last dose taken    lidocaine 5 % Patch   Commonly known as:  LIDODERM   Dose:  1 patch   Quantity:  10 patch        Place 1 patch onto the skin every 24 hours for 10 days   Refills:  0                Prescriptions were sent or printed at these locations (1 Prescription)                   Other Prescriptions                Printed at Department/Unit printer (1 of 1)         lidocaine (LIDODERM) 5 % Patch                Procedures and tests performed during your visit     XR Chest 2 Views      Orders Needing Specimen Collection     None       Pending Results     No orders found from 1/8/2018 to 1/11/2018.            Pending Culture Results     No orders found from 1/8/2018 to 1/11/2018.            Pending Results Instructions     If you had any lab results that were not finalized at the time of your Discharge, you can call the ED Lab Result RN at [phone]. You will be contacted by this team for any positive Lab results or changes in treatment. The nurses are available 7 days a week from 10A to 6:30P.  You can leave a message 24 hours per day and they will return your call.        Test Results From Your Hospital Stay        1/10/2018  7:39 AM      Narrative     XR CHEST 2 VW 1/10/2018 7:36 AM    HISTORY: Chest pain after fall.    COMPARISON: 2/1/2017    FINDINGS: No airspace consolidation, pleural effusion or pneumothorax.  Normal heart size. Ribs appear intact.        Impression     IMPRESSION: No acute abnormality.    ROLANDO KEY MD                Clinical Quality Measure: Blood Pressure Screening     Your blood pressure was checked while you were in the emergency department today. The last reading we obtained was  BP: (!) 182/102 . Please read the guidelines below about what these numbers mean and what you should do about them.  If your systolic blood pressure (the top number) is less than 120 and your diastolic blood pressure (the bottom number) is less than 80, then your blood pressure is normal. There is nothing more that you need to do about it.  If your systolic blood pressure (the top number) is 120-139 or your diastolic blood pressure (the bottom number) is 80-89, your blood pressure may be higher than it should be. You should have your blood pressure rechecked within a year by a primary care provider.  If your systolic blood pressure (the top number) is 140 or greater or your diastolic blood pressure (the bottom number) is 90 or greater, you may have high blood pressure. High blood pressure is treatable, but if left untreated over time  "it can put you at risk for heart attack, stroke, or kidney failure. You should have your blood pressure rechecked by a primary care provider within the next 4 weeks.  If your provider in the emergency department today gave you specific instructions to follow-up with your doctor or provider even sooner than that, you should follow that instruction and not wait for up to 4 weeks for your follow-up visit.        Thank you for choosing [facility]       Thank you for choosing [facility] for your care. Our goal is always to provide you with excellent care. Hearing back from our patients is one way we can continue to improve our services. Please take a few minutes to complete the written survey that you may receive in the mail after you visit with us. Thank you!        [email or web address] Information     [facility] lets you send messages to your doctor, view your test results, renew your prescriptions, schedule appointments and more. To sign up, go to [email or web address] . Click on \"Log in\" on the left side of the screen, which will take you to the Welcome page. Then click on \"Sign up Now\" on the right side of the page.     You will be asked to enter the access code listed below, as well as some personal information. Please follow the directions to create your username and password.     Your access code is: [email or web address]  Expires: 4/10/2018  8:44 AM     Your access code will  in 90 days. If you need help or a new code, please call your [facility] clinic or [phone].        Care EveryWhere ID     This is your Care EveryWhere ID. This could be used by other organizations to access your [facility] medical records  [id number]        Equal Access to Services     [facility] : Hadii jamaal Moses, waaxda luqadaha, qaybta kaalnavin daly . So [facility] [phone].    ATENCIÓN: Si habla español, tiene a manzo disposición servicios gratuitos de asistencia lingüística. Llame al " [phone].    We comply with applicable federal civil rights laws and [place] laws. We do not discriminate on the basis of race, color, national origin, age, disability, sex, sexual orientation, or gender identity.            After Visit Summary       This is your record. Keep this with you and show to your community pharmacist(s) and doctor(s) at your next visit.

## 2018-01-10 NOTE — ED AVS SNAPSHOT
[facility] Emergency Department    [address]    [facility] [address]    Phone:  [phone]    Fax:  [phone]                                       Desire Moise   MRN: [id number]    Department:  [facility] Emergency Department   Date of Visit:  1/10/2018           After Visit Summary Signature Page     I have received my discharge instructions, and my questions have been answered. I have discussed any challenges I see with this plan with the nurse or doctor.    ..........................................................................................................................................  Patient/Patient Representative Signature      ..........................................................................................................................................  Patient Representative Print Name and Relationship to Patient    ..................................................               ................................................  Date                                            Time    ..........................................................................................................................................  Reviewed by Signature/Title    ...................................................              ..............................................  Date                                                            Time

## 2018-01-10 NOTE — ED PROVIDER NOTES
"  History     Chief Complaint:  Fall      HPI   Desire Moise is a 57 year old female who presents to the emergency department today accompanied by her  for evaluation of injuries sustained in a fall. The patient reports that she was walking out of a [employer] restaurant on Friday 01/05/2018 around 2100 when she slipped on a recently mopped area of the floor and mechanically fell, hitting the left side of her ribs on a ledge. She states that she has used ibuprofen for pain control and thought that yesterday her pain had begun to improve. However, she notes that last night when she was getting out of her truck after work, the car door swung back and pushed the heavy binder she was carrying into the same area of her ribs which had been injured on Friday. She reports that she tasted some blood in her mouth after the binder hit her. She also notes difficulty with deep breaths and with sleeping as well as nausea.    Allergies:  No Known Drug Allergies     Medications:    Ibuprofen    Past Medical History:    Diverticulosis  Anemia  Spina bifida occulta    Past Surgical History:    Hysterectomy  Incision of hymen  Open reduction internal fixation wrist  [place] teeth surgery    Family History:    Father: Heart disease, lipids  Maternal Grandmother: Breast cancer    Social History:  The patient was accompanied to the ED by her .  Smoking Status: Never Smoker  Smokeless Tobacco: Never Used  Alcohol Use: Negative  Marital Status:       Review of Systems   Respiratory:        Positive for pain with deep breaths.   Gastrointestinal: Positive for nausea.   Musculoskeletal:        Positive for left-sided rib pain.   Psychiatric/Behavioral: Positive for sleep disturbance.   All other systems reviewed and are negative.    Physical Exam     Patient Vitals for the past 24 hrs:   BP Temp Temp src Heart Rate Resp SpO2 Height Weight   01/10/18 0657 (!) 182/102 97.7  F (36.5  C) Oral 70 16 98 % 1.676 m (5' 6\") " 68 kg (150 lb)     Physical Exam  Constitutional:  Appears well-developed and well-nourished. Alert. Conversant. Non toxic.  HENT:   Head: Atraumatic.   Nose: Nose normal.  Mouth/Throat: Oral mucosa is clear and moist. no trismus. Pharynx normal. Tonsils symmetric. No tonsillar enlargement, erythema, or exudate.  Eyes: Conjunctivae normal. EOM normal. Pupils equal, round, and reactive to light. No scleral icterus.   Neck: Normal range of motion. Neck supple. No tracheal deviation present.   Cardiovascular: Normal rate, regular rhythm. No gallop. No friction rub. No murmur heard. Symmetric radial artery pulses   Pulmonary/Chest: Effort normal. No stridor. No respiratory distress. No wheezes. No rales. No rhonchi .small 2cm area of yellow/purple ecchymosis and marked tenderness of left lower anterolateral ribs below breast. No crepitus. No LUQ tenderness or left CVA tenderness  Abdominal: Soft. Bowel sounds normal. No distension. No mass. No tenderness. No rebound. No guarding.   Musculoskeletal:   RUE: Normal range of motion. No tenderness. No deformity  LUE: Normal range of motion. No tenderness. No deformity  RLE: Normal range of motion. No edema. No tenderness. No deformity  LLE: Normal range of motion. No edema. No tenderness. No deformity  Lymph: No cervical adenopathy.   Neurological: Alert and oriented to person, place, and time. Normal strength. CN II-VII intact. No sensory deficit. GCS eye subscore is 4. GCS verbal subscore is 5. GCS motor subscore is 6. Normal coordination   Skin: Skin is warm and dry. No rash noted. No pallor. Normal capillary refill.  Psychiatric:  Normal mood. Normal affect.     Emergency Department Course     Imaging:  Radiology findings were communicated with the patient who voiced understanding of the findings.    XR Chest 2 Views  No acute abnormality.  ROLANDO KEY MD  Reading per radiology    Interventions:  0759 Lidocaine 4% 1 patch transdermal    Emergency Department  Course:  Nursing notes and vitals reviewed.  I performed an exam of the patient as documented above.   The patient was sent for a XR Chest 2 Views while in the emergency department, results above.   0835 I discussed the treatment plan with the patient. They expressed understanding of this plan and consented to discharge. They will be discharged home with instructions for care and follow up. In addition, the patient will return to the emergency department if their symptoms persist, worsen, if new symptoms arise or if there is any concern.  All questions were answered.  I personally reviewed the imaging results with the patient and answered all related questions prior to discharge.    Impression & Plan      Medical Decision Making:  Desire Moise is a 57 year old female who presents for evaluation after two injuries to the left lower chest wall.  First injury occurred about 5 decades ago when she slipped and fell on the ice outside a restaurant.  She reinjured her ribs yesterday when she struck herself in the ribs holding a heavy binder after being hit by her car door.  The patient describes associated shortness of breath and complains of significant pleuritic pain.  The patient has good breath sounds in all fields and no hypoxia or tachycardia.  There is no large hematoma or contusion.  No deformity. No crepitance.  Chest x-ray was obtained. No fracture identified.  No hemo or pneumothorax.      The patient was given an incentive spirometer and instructed to follow up as an outpatient.      The patient was given return precautions and follow up instructions, they state understanding of these and ability to comply.    With reasonable clinical certainty, I believe the patient is safe for discharge and can be safely managed as an outpatient.    Pain control for her is difficult.  She has been having inadequate pain control with ibuprofen, but she is only been taking 200 mg per dose.  She is refusing opiate analgesics  or Ultram here due to history of benzodiazepine addiction.  We placed a lidocaine patch, without much improvement here.  We will have her increase her dose of ibuprofen from 200-600 mg 3 times a day and try lidocaine patches at home.    Diagnosis:    ICD-10-CM    1. Closed fracture of one rib of left side, initial encounter S22.32XA      Disposition:  The patient is discharged to home.    Discharge Medications:  New Prescriptions    LIDOCAINE (LIDODERM) 5 % PATCH    Place 1 patch onto the skin every 24 hours for 10 days     Scribe Disclosure:  Emiliano MAZARIEGOS, am serving as a scribe at 7:09 AM on 1/10/2018 to document services personally performed by Marlon Lynn MD, based on my observations and the provider's statements to me.  [facility] EMERGENCY DEPARTMENT       Marlon Lynn MD  01/10/18 2857

## 2018-02-02 ENCOUNTER — HOSPITAL ENCOUNTER (OUTPATIENT)
Facility: CLINIC | Age: 58
Discharge: HOME | End: 2018-02-02
Attending: ORTHOPAEDIC SURGERY | Admitting: ORTHOPAEDIC SURGERY
Payer: COMMERCIAL

## 2018-02-02 ENCOUNTER — ANESTHESIA EVENT (OUTPATIENT)
Dept: SURGERY | Facility: CLINIC | Age: 58
End: 2018-02-02
Payer: COMMERCIAL

## 2018-02-02 ENCOUNTER — ANESTHESIA (OUTPATIENT)
Dept: SURGERY | Facility: CLINIC | Age: 58
End: 2018-02-02
Payer: COMMERCIAL

## 2018-02-02 ENCOUNTER — SURGERY (OUTPATIENT)
Age: 58
End: 2018-02-02

## 2018-02-02 VITALS
HEIGHT: 66 IN | DIASTOLIC BLOOD PRESSURE: 91 MMHG | OXYGEN SATURATION: 100 % | TEMPERATURE: 98.1 F | SYSTOLIC BLOOD PRESSURE: 163 MMHG | WEIGHT: 150 LBS | HEART RATE: 72 BPM | RESPIRATION RATE: 16 BRPM | BODY MASS INDEX: 24.11 KG/M2

## 2018-02-02 DIAGNOSIS — S52.531S CLOSED COLLES' FRACTURE OF RIGHT RADIUS, SEQUELA: Primary | ICD-10-CM

## 2018-02-02 PROCEDURE — 37000009 ZZH ANESTHESIA TECHNICAL FEE, EACH ADDTL 15 MIN: Performed by: ORTHOPAEDIC SURGERY

## 2018-02-02 PROCEDURE — 71000013 ZZH RECOVERY PHASE 1 LEVEL 1 EA ADDTL HR: Performed by: ORTHOPAEDIC SURGERY

## 2018-02-02 PROCEDURE — 25000128 H RX IP 250 OP 636: Performed by: NURSE ANESTHETIST, CERTIFIED REGISTERED

## 2018-02-02 PROCEDURE — 27210995 ZZH RX 272: Performed by: ORTHOPAEDIC SURGERY

## 2018-02-02 PROCEDURE — 71000027 ZZH RECOVERY PHASE 2 EACH 15 MINS: Performed by: ORTHOPAEDIC SURGERY

## 2018-02-02 PROCEDURE — 36000052 ZZH SURGERY LEVEL 2 EA 15 ADDTL MIN: Performed by: ORTHOPAEDIC SURGERY

## 2018-02-02 PROCEDURE — 27210794 ZZH OR GENERAL SUPPLY STERILE: Performed by: ORTHOPAEDIC SURGERY

## 2018-02-02 PROCEDURE — 25000125 ZZHC RX 250: Performed by: NURSE ANESTHETIST, CERTIFIED REGISTERED

## 2018-02-02 PROCEDURE — 25000566 ZZH SEVOFLURANE, EA 15 MIN: Performed by: ORTHOPAEDIC SURGERY

## 2018-02-02 PROCEDURE — 40000306 ZZH STATISTIC PRE PROC ASSESS II: Performed by: ORTHOPAEDIC SURGERY

## 2018-02-02 PROCEDURE — 37000008 ZZH ANESTHESIA TECHNICAL FEE, 1ST 30 MIN: Performed by: ORTHOPAEDIC SURGERY

## 2018-02-02 PROCEDURE — 71000012 ZZH RECOVERY PHASE 1 LEVEL 1 FIRST HR: Performed by: ORTHOPAEDIC SURGERY

## 2018-02-02 PROCEDURE — 25000128 H RX IP 250 OP 636: Performed by: ORTHOPAEDIC SURGERY

## 2018-02-02 PROCEDURE — 25000132 ZZH RX MED GY IP 250 OP 250 PS 637: Performed by: ORTHOPAEDIC SURGERY

## 2018-02-02 PROCEDURE — 36000054 ZZH SURGERY LEVEL 2 W FLUORO 1ST 30 MIN: Performed by: ORTHOPAEDIC SURGERY

## 2018-02-02 RX ORDER — LIDOCAINE 40 MG/G
CREAM TOPICAL
Status: DISCONTINUED | OUTPATIENT
Start: 2018-02-02 | End: 2018-02-02 | Stop reason: HOSPADM

## 2018-02-02 RX ORDER — OXYCODONE HYDROCHLORIDE 5 MG/1
5 TABLET ORAL
Status: DISCONTINUED | OUTPATIENT
Start: 2018-02-02 | End: 2018-02-02 | Stop reason: HOSPADM

## 2018-02-02 RX ORDER — ONDANSETRON 2 MG/ML
4 INJECTION INTRAMUSCULAR; INTRAVENOUS EVERY 30 MIN PRN
Status: DISCONTINUED | OUTPATIENT
Start: 2018-02-02 | End: 2018-02-02 | Stop reason: HOSPADM

## 2018-02-02 RX ORDER — CEFAZOLIN SODIUM 1 G/3ML
1 INJECTION, POWDER, FOR SOLUTION INTRAMUSCULAR; INTRAVENOUS SEE ADMIN INSTRUCTIONS
Status: DISCONTINUED | OUTPATIENT
Start: 2018-02-02 | End: 2018-02-02 | Stop reason: HOSPADM

## 2018-02-02 RX ORDER — ONDANSETRON 4 MG/1
4 TABLET, ORALLY DISINTEGRATING ORAL EVERY 30 MIN PRN
Status: DISCONTINUED | OUTPATIENT
Start: 2018-02-02 | End: 2018-02-02 | Stop reason: HOSPADM

## 2018-02-02 RX ORDER — KETOROLAC TROMETHAMINE 30 MG/ML
INJECTION, SOLUTION INTRAMUSCULAR; INTRAVENOUS PRN
Status: DISCONTINUED | OUTPATIENT
Start: 2018-02-02 | End: 2018-02-02

## 2018-02-02 RX ORDER — MEPERIDINE HYDROCHLORIDE 25 MG/ML
12.5 INJECTION INTRAMUSCULAR; INTRAVENOUS; SUBCUTANEOUS
Status: DISCONTINUED | OUTPATIENT
Start: 2018-02-02 | End: 2018-02-02 | Stop reason: HOSPADM

## 2018-02-02 RX ORDER — AMOXICILLIN 250 MG
1-2 CAPSULE ORAL 2 TIMES DAILY
Qty: 10 TABLET | Refills: 0 | Status: ON HOLD | OUTPATIENT
Start: 2018-02-02 | End: 2018-10-13

## 2018-02-02 RX ORDER — MAGNESIUM HYDROXIDE 1200 MG/15ML
LIQUID ORAL PRN
Status: DISCONTINUED | OUTPATIENT
Start: 2018-02-02 | End: 2018-02-02 | Stop reason: HOSPADM

## 2018-02-02 RX ORDER — FENTANYL CITRATE 50 UG/ML
25-50 INJECTION, SOLUTION INTRAMUSCULAR; INTRAVENOUS
Status: DISCONTINUED | OUTPATIENT
Start: 2018-02-02 | End: 2018-02-02 | Stop reason: HOSPADM

## 2018-02-02 RX ORDER — ONDANSETRON 4 MG/1
4 TABLET, ORALLY DISINTEGRATING ORAL
Status: DISCONTINUED | OUTPATIENT
Start: 2018-02-02 | End: 2018-02-02 | Stop reason: HOSPADM

## 2018-02-02 RX ORDER — PROMETHAZINE HYDROCHLORIDE 25 MG/ML
6.25 INJECTION, SOLUTION INTRAMUSCULAR; INTRAVENOUS
Status: DISCONTINUED | OUTPATIENT
Start: 2018-02-02 | End: 2018-02-02 | Stop reason: HOSPADM

## 2018-02-02 RX ORDER — HYDROMORPHONE HYDROCHLORIDE 1 MG/ML
.3-.5 INJECTION, SOLUTION INTRAMUSCULAR; INTRAVENOUS; SUBCUTANEOUS EVERY 10 MIN PRN
Status: DISCONTINUED | OUTPATIENT
Start: 2018-02-02 | End: 2018-02-02 | Stop reason: HOSPADM

## 2018-02-02 RX ORDER — PROPOFOL 10 MG/ML
INJECTION, EMULSION INTRAVENOUS CONTINUOUS PRN
Status: DISCONTINUED | OUTPATIENT
Start: 2018-02-02 | End: 2018-02-02

## 2018-02-02 RX ORDER — NALOXONE HYDROCHLORIDE 0.4 MG/ML
.1-.4 INJECTION, SOLUTION INTRAMUSCULAR; INTRAVENOUS; SUBCUTANEOUS
Status: DISCONTINUED | OUTPATIENT
Start: 2018-02-02 | End: 2018-02-02 | Stop reason: HOSPADM

## 2018-02-02 RX ORDER — SODIUM CHLORIDE, SODIUM LACTATE, POTASSIUM CHLORIDE, CALCIUM CHLORIDE 600; 310; 30; 20 MG/100ML; MG/100ML; MG/100ML; MG/100ML
INJECTION, SOLUTION INTRAVENOUS CONTINUOUS
Status: DISCONTINUED | OUTPATIENT
Start: 2018-02-02 | End: 2018-02-02 | Stop reason: HOSPADM

## 2018-02-02 RX ORDER — ACETAMINOPHEN 325 MG/1
650 TABLET ORAL ONCE
Status: COMPLETED | OUTPATIENT
Start: 2018-02-02 | End: 2018-02-02

## 2018-02-02 RX ORDER — HYDROCODONE BITARTRATE AND ACETAMINOPHEN 5; 325 MG/1; MG/1
1-2 TABLET ORAL EVERY 4 HOURS PRN
Qty: 10 TABLET | Refills: 0 | Status: ON HOLD | OUTPATIENT
Start: 2018-02-02 | End: 2018-10-13

## 2018-02-02 RX ORDER — PROPOFOL 10 MG/ML
INJECTION, EMULSION INTRAVENOUS PRN
Status: DISCONTINUED | OUTPATIENT
Start: 2018-02-02 | End: 2018-02-02

## 2018-02-02 RX ORDER — LIDOCAINE HYDROCHLORIDE 10 MG/ML
INJECTION, SOLUTION INFILTRATION; PERINEURAL PRN
Status: DISCONTINUED | OUTPATIENT
Start: 2018-02-02 | End: 2018-02-02

## 2018-02-02 RX ORDER — ACETAMINOPHEN 325 MG/1
650 TABLET ORAL EVERY 4 HOURS PRN
Qty: 30 TABLET | Refills: 0 | Status: SHIPPED | OUTPATIENT
Start: 2018-02-02 | End: 2019-03-07

## 2018-02-02 RX ORDER — SODIUM CHLORIDE, SODIUM LACTATE, POTASSIUM CHLORIDE, CALCIUM CHLORIDE 600; 310; 30; 20 MG/100ML; MG/100ML; MG/100ML; MG/100ML
INJECTION, SOLUTION INTRAVENOUS CONTINUOUS PRN
Status: DISCONTINUED | OUTPATIENT
Start: 2018-02-02 | End: 2018-02-02

## 2018-02-02 RX ORDER — DEXAMETHASONE SODIUM PHOSPHATE 4 MG/ML
INJECTION, SOLUTION INTRA-ARTICULAR; INTRALESIONAL; INTRAMUSCULAR; INTRAVENOUS; SOFT TISSUE PRN
Status: DISCONTINUED | OUTPATIENT
Start: 2018-02-02 | End: 2018-02-02

## 2018-02-02 RX ORDER — FENTANYL CITRATE 50 UG/ML
INJECTION, SOLUTION INTRAMUSCULAR; INTRAVENOUS PRN
Status: DISCONTINUED | OUTPATIENT
Start: 2018-02-02 | End: 2018-02-02

## 2018-02-02 RX ORDER — HYDROXYZINE HYDROCHLORIDE 25 MG/1
25 TABLET, FILM COATED ORAL
Status: DISCONTINUED | OUTPATIENT
Start: 2018-02-02 | End: 2018-02-02 | Stop reason: HOSPADM

## 2018-02-02 RX ORDER — ALBUTEROL SULFATE 0.83 MG/ML
2.5 SOLUTION RESPIRATORY (INHALATION) EVERY 4 HOURS PRN
Status: DISCONTINUED | OUTPATIENT
Start: 2018-02-02 | End: 2018-02-02 | Stop reason: HOSPADM

## 2018-02-02 RX ORDER — CEFAZOLIN SODIUM 2 G/100ML
2 INJECTION, SOLUTION INTRAVENOUS
Status: COMPLETED | OUTPATIENT
Start: 2018-02-02 | End: 2018-02-02

## 2018-02-02 RX ORDER — GLYCOPYRROLATE 0.2 MG/ML
INJECTION, SOLUTION INTRAMUSCULAR; INTRAVENOUS PRN
Status: DISCONTINUED | OUTPATIENT
Start: 2018-02-02 | End: 2018-02-02

## 2018-02-02 RX ORDER — ONDANSETRON 2 MG/ML
INJECTION INTRAMUSCULAR; INTRAVENOUS PRN
Status: DISCONTINUED | OUTPATIENT
Start: 2018-02-02 | End: 2018-02-02

## 2018-02-02 RX ADMIN — FENTANYL CITRATE 50 MCG: 50 INJECTION, SOLUTION INTRAMUSCULAR; INTRAVENOUS at 14:24

## 2018-02-02 RX ADMIN — PROPOFOL 75 MCG/KG/MIN: 10 INJECTION, EMULSION INTRAVENOUS at 14:18

## 2018-02-02 RX ADMIN — FENTANYL CITRATE 100 MCG: 50 INJECTION, SOLUTION INTRAMUSCULAR; INTRAVENOUS at 14:13

## 2018-02-02 RX ADMIN — ONDANSETRON 4 MG: 2 INJECTION INTRAMUSCULAR; INTRAVENOUS at 14:34

## 2018-02-02 RX ADMIN — FENTANYL CITRATE 25 MCG: 50 INJECTION, SOLUTION INTRAMUSCULAR; INTRAVENOUS at 14:34

## 2018-02-02 RX ADMIN — GLYCOPYRROLATE 0.2 MG: 0.2 INJECTION, SOLUTION INTRAMUSCULAR; INTRAVENOUS at 14:13

## 2018-02-02 RX ADMIN — HYDROCODONE BITARTRATE AND ACETAMINOPHEN 100 ML: 500; 5 TABLET ORAL at 14:40

## 2018-02-02 RX ADMIN — PROPOFOL 150 MG: 10 INJECTION, EMULSION INTRAVENOUS at 14:13

## 2018-02-02 RX ADMIN — ACETAMINOPHEN 650 MG: 325 TABLET, FILM COATED ORAL at 16:03

## 2018-02-02 RX ADMIN — MIDAZOLAM 2 MG: 1 INJECTION INTRAMUSCULAR; INTRAVENOUS at 14:06

## 2018-02-02 RX ADMIN — CEFAZOLIN SODIUM 2 G: 2 INJECTION, SOLUTION INTRAVENOUS at 13:21

## 2018-02-02 RX ADMIN — CEFAZOLIN SODIUM 2 G: 2 INJECTION, SOLUTION INTRAVENOUS at 14:18

## 2018-02-02 RX ADMIN — DEXAMETHASONE SODIUM PHOSPHATE 4 MG: 4 INJECTION, SOLUTION INTRA-ARTICULAR; INTRALESIONAL; INTRAMUSCULAR; INTRAVENOUS; SOFT TISSUE at 14:13

## 2018-02-02 RX ADMIN — LIDOCAINE HYDROCHLORIDE 30 MG: 10 INJECTION, SOLUTION INFILTRATION; PERINEURAL at 14:13

## 2018-02-02 RX ADMIN — KETOROLAC TROMETHAMINE 30 MG: 30 INJECTION, SOLUTION INTRAMUSCULAR at 14:36

## 2018-02-02 RX ADMIN — SODIUM CHLORIDE, POTASSIUM CHLORIDE, SODIUM LACTATE AND CALCIUM CHLORIDE: 600; 310; 30; 20 INJECTION, SOLUTION INTRAVENOUS at 13:21

## 2018-02-02 NOTE — ANESTHESIA POSTPROCEDURE EVALUATION
Patient: Desire Moise    Procedure(s):  Removal of hardware, right distal radius   - Wound Class: I-Clean    Diagnosis:shift in hardware status post ORIF  Diagnosis Additional Information: right distal radius  shift in hardware status post ORIF    Anesthesia Type:  General, LMA    Note:  Anesthesia Post Evaluation    Patient location during evaluation: PACU  Patient participation: Able to fully participate in evaluation  Level of consciousness: awake  Pain management: adequate  Airway patency: patent  Cardiovascular status: acceptable  Respiratory status: acceptable  Hydration status: acceptable  PONV: controlled     Anesthetic complications: None          Last vitals:  Vitals:    02/02/18 1450 02/02/18 1500 02/02/18 1505   BP: 141/84 (!) 149/92 154/88   Pulse:      Resp: 10 16 11   Temp:      SpO2: 98% 100% 100%         Electronically Signed By: Fredi Cunha DO  February 2, 2018  3:19 PM

## 2018-02-02 NOTE — IP AVS SNAPSHOT
[facility] PreOP/PostOP    [address]    [facility] [address]    Phone:  [phone]    Fax:  [phone]                                       After Visit Summary   2/2/2018    Desire Moise    MRN: [id number]           After Visit Summary Signature Page     I have received my discharge instructions, and my questions have been answered. I have discussed any challenges I see with this plan with the nurse or doctor.    ..........................................................................................................................................  Patient/Patient Representative Signature      ..........................................................................................................................................  Patient Representative Print Name and Relationship to Patient    ..................................................               ................................................  Date                                            Time    ..........................................................................................................................................  Reviewed by Signature/Title    ...................................................              ..............................................  Date                                                            Time

## 2018-02-02 NOTE — DISCHARGE INSTRUCTIONS
Maximum acetaminophen (Tylenol) dose from all sources should not exceed 4 grams (4000 mg) per day. You received 650 mg at 4:00 pm.      You received Toradol, an IV form of ibuprofen (Motrin) at 2:35 pm.  Do not take any ibuprofen products until 8:35 pm.                HAND OR WRIST SURGERY DISCHARGE INSTRUCTIONS  DR. MEKA ASHRAF M.D.  [phone]    PAIN  You may have numbing medication in your incisional area.  As this medication wears off you can take the pills prescribed for you.  Elevate your arm for the first 24 hours to reduce swelling and pain.  Use ice as needed.    DRESSINGS  Keep your dressing dry and intact as instructed.  Cover it with plastic wrap or plastic bag to shower.  Trigger finger release may change dressing in 3 days and cover incision with a band aid.  Endoscopic carpal tunnel release may change dressing in 3 days, cover incision with a band aid and use brace full time until return appointment.    ACTIVITIES  After the surgery begin moving your fingers immediately.  You may use your hand for light activities and driving.  You may also return to work for light duty or as discussed with your doctor.  No heavy lifting, pushing or pulling with your surgical hand.    FOLLOW UP  If a follow up appointment was not made for you, call the office as soon as possible after your surgery to schedule a follow up appointment for 10-14 days post operatively.  You can be seen at our office at [facility] at [address] [place] [address] in [place].  Call [phone] for your follow up appointment.    CALL OUR OFFICE [phone] IF:  You develop a fever of 101 degrees or above.  You develop redness, swelling or drainage around the incisional area.  You have problems such as rash or continued nausea and/or vomiting with medication.  You have any questions, problems or concerns.    ***For emergencies after 5 pm, call the on-call physician at [phone]***    GENERAL ANESTHESIA OR SEDATION ADULT  DISCHARGE INSTRUCTIONS   SPECIAL PRECAUTIONS FOR 24 HOURS AFTER SURGERY    IT IS NOT UNUSUAL TO FEEL LIGHT-HEADED OR FAINT, UP TO 24 HOURS AFTER SURGERY OR WHILE TAKING PAIN MEDICATION.  IF YOU HAVE THESE SYMPTOMS; SIT FOR A FEW MINUTES BEFORE STANDING AND HAVE SOMEONE ASSIST YOU WHEN YOU GET UP TO WALK OR USE THE BATHROOM.    YOU SHOULD REST AND RELAX FOR THE NEXT 24 HOURS AND YOU MUST MAKE ARRANGEMENTS TO HAVE SOMEONE STAY WITH YOU FOR AT LEAST 24 HOURS AFTER YOUR DISCHARGE.  AVOID HAZARDOUS AND STRENUOUS ACTIVITIES.  DO NOT MAKE IMPORTANT DECISIONS FOR 24 HOURS.    DO NOT DRIVE ANY VEHICLE OR OPERATE MECHANICAL EQUIPMENT FOR 24 HOURS FOLLOWING THE END OF YOUR SURGERY.  EVEN THOUGH YOU MAY FEEL NORMAL, YOUR REACTIONS MAY BE AFFECTED BY THE MEDICATION YOU HAVE RECEIVED.    DO NOT DRINK ALCOHOLIC BEVERAGES FOR 24 HOURS FOLLOWING YOUR SURGERY.    DRINK CLEAR LIQUIDS (APPLE JUICE, GINGER ALE, 7-UP, BROTH, ETC.).  PROGRESS TO YOUR REGULAR DIET AS YOU FEEL ABLE.    YOU MAY HAVE A DRY MOUTH, A SORE THROAT, MUSCLES ACHES OR TROUBLE SLEEPING.  THESE SHOULD GO AWAY AFTER 24 HOURS.    CALL YOUR DOCTOR FOR ANY OF THE FOLLOWING:  SIGNS OF INFECTION (FEVER, GROWING TENDERNESS AT THE SURGERY SITE, A LARGE AMOUNT OF DRAINAGE OR BLEEDING, SEVERE PAIN, FOUL-SMELLING DRAINAGE, REDNESS OR SWELLING.    IT HAS BEEN OVER 8 TO 10 HOURS SINCE SURGERY AND YOU ARE STILL NOT ABLE TO URINATE (PASS WATER).

## 2018-02-02 NOTE — ANESTHESIA CARE TRANSFER NOTE
Patient: Desire Moise    Procedure(s):  Removal of hardware, right distal radius   - Wound Class: I-Clean    Diagnosis: shift in hardware status post ORIF  Diagnosis Additional Information: No value filed.    Anesthesia Type:   General, LMA     Note:  Airway :Blow-by and Face Mask  Patient transferred to:PACU  Comments: VSS,awake and sleepy,no anesthetic complications,airway patent. Handoff Report: Identifed the Patient, Identified the Reponsible Provider, Reviewed the pertinent medical history, Discussed the surgical course, Reviewed Intra-OP anesthesia mangement and issues during anesthesia and Allowed opportunity for questions and acknowledgement of understanding      Vitals: (Last set prior to Anesthesia Care Transfer)    CRNA VITALS  2/2/2018 1417 - 2/2/2018 1450      2/2/2018             Resp Rate (observed): 13                Electronically Signed By: CHAKA Ruiz CRNA  February 2, 2018  2:50 PM

## 2018-02-02 NOTE — ANESTHESIA PREPROCEDURE EVALUATION
Anesthesia Evaluation     .             ROS/MED HX    ENT/Pulmonary:  - neg pulmonary ROS     Neurologic:  - neg neurologic ROS     Cardiovascular:  - neg cardiovascular ROS       METS/Exercise Tolerance:     Hematologic:  - neg hematologic  ROS       Musculoskeletal:  - neg musculoskeletal ROS       GI/Hepatic:  - neg GI/hepatic ROS       Renal/Genitourinary:  - ROS Renal section negative       Endo: Comment: .Body mass index is 24.21 kg/(m^2).   - neg endo ROS       Psychiatric:  - neg psychiatric ROS       Infectious Disease:  - neg infectious disease ROS       Malignancy:         Other: Comment: .Lab Test        03/14/16     08/07/15     04/08/15     04/04/15                       2200          0900          1427          0005          WBC          6.8          4.1          4.8          5.6           HGB          11.5*        13.3         13.1         12.3          MCV          88           86           88           86            PLT          240          221          239          230           INR           --           --           --          1.01           Lab Test        03/14/16     08/07/15     04/08/15                       2200          0900          1427          NA           142          140          140           POTASSIUM    3.3*         3.4          4.1           CHLORIDE     105          105          106           CO2          28           27           28            BUN          10           13           10            CR           0.72         0.73         0.74          ANIONGAP     9            8            6             BENI          9.1          8.7          9.0           GLC          90           91           87                                Physical Exam  Normal systems: cardiovascular and pulmonary    Airway     Dental     Cardiovascular   Rhythm and rate: regular and normal      Pulmonary    breath sounds clear to auscultation                    Anesthesia Plan      History & Physical  Review  History and physical reviewed and following examination; no interval change.    ASA Status:  1 .        Plan for General and LMA with Intravenous induction. Maintenance will be Inhalation and Balanced.    PONV prophylaxis:  Ondansetron (or other 5HT-3) and Dexamethasone or Solumedrol       Postoperative Care  Postoperative pain management:  IV analgesics, Oral pain medications and Multi-modal analgesia.      Consents  Anesthetic plan, risks, benefits and alternatives discussed with:  Patient or representative..                          .

## 2018-02-02 NOTE — IP AVS SNAPSHOT
MRN:[id number]                      After Visit Summary   2/2/2018    Desire Moise    MRN: [id number]           Thank you!     Thank you for choosing [facility] for your care. Our goal is always to provide you with excellent care. Hearing back from our patients is one way we can continue to improve our services. Please take a few minutes to complete the written survey that you may receive in the mail after you visit. If you would like to speak to someone directly about your visit please contact Patient Relations at [phone]. Thank you!          Patient Information     Date Of Birth          1960        About your hospital stay     You were admitted on:  February 2, 2018 You last received care in the:  [facility] PreOP/PostOP    You were discharged on:  February 2, 2018       Who to Call     For medical emergencies, please call 911.  For non-urgent questions about your medical care, please call your primary care provider or clinic, [phone]  For questions related to your surgery, please call your surgery clinic        Attending Provider     Provider Specialty    Santi Villarreal MD Orthopedics       Primary Care Provider Office Phone # Fax #    Matilde Christopher [phone]      After Care Instructions      Diet as Tolerated       Return to diet before surgery, unless instructed otherwise.            Discharge Instructions       Review outpatient procedure discharge instructions with patient as directed by Provider            Ice to affected area       Ice pack to surgical site every 15 minutes per hour for 24 hours            Notify Provider       For signs and symptoms of infection: Fever greater than 101, redness, swelling, heat at site, drainage, pus.            Remove dressing - at 48 hours       Then okay to get incisions wet in shower.  Leave steri strips in place until they fall off.            Return to clinic       Return to clinic in 2  weeks            Weight bearing - As tolerated       No impact loading.                  Further instructions from your care team       Maximum acetaminophen (Tylenol) dose from all sources should not exceed 4 grams (4000 mg) per day. You received 650 mg at 4:00 pm.      You received Toradol, an IV form of ibuprofen (Motrin) at 2:35 pm.  Do not take any ibuprofen products until 8:35 pm.                HAND OR WRIST SURGERY DISCHARGE INSTRUCTIONS  DR. MEKA ASHRAF M.D.  [phone]    PAIN  You may have numbing medication in your incisional area.  As this medication wears off you can take the pills prescribed for you.  Elevate your arm for the first 24 hours to reduce swelling and pain.  Use ice as needed.    DRESSINGS  Keep your dressing dry and intact as instructed.  Cover it with plastic wrap or plastic bag to shower.  Trigger finger release may change dressing in 3 days and cover incision with a band aid.  Endoscopic carpal tunnel release may change dressing in 3 days, cover incision with a band aid and use brace full time until return appointment.    ACTIVITIES  After the surgery begin moving your fingers immediately.  You may use your hand for light activities and driving.  You may also return to work for light duty or as discussed with your doctor.  No heavy lifting, pushing or pulling with your surgical hand.    FOLLOW UP  If a follow up appointment was not made for you, call the office as soon as possible after your surgery to schedule a follow up appointment for 10-14 days post operatively.  You can be seen at our office at [facility] at [address] [place] [address] in [place].  Call [phone] for your follow up appointment.    CALL OUR OFFICE -[phone] IF:  You develop a fever of 101 degrees or above.  You develop redness, swelling or drainage around the incisional area.  You have problems such as rash or continued nausea and/or vomiting with medication.  You have any questions, problems  "or concerns.    ***For emergencies after 5 pm, call the on-call physician at [phone]***    GENERAL ANESTHESIA OR SEDATION ADULT DISCHARGE INSTRUCTIONS   SPECIAL PRECAUTIONS FOR 24 HOURS AFTER SURGERY    IT IS NOT UNUSUAL TO FEEL LIGHT-HEADED OR FAINT, UP TO 24 HOURS AFTER SURGERY OR WHILE TAKING PAIN MEDICATION.  IF YOU HAVE THESE SYMPTOMS; SIT FOR A FEW MINUTES BEFORE STANDING AND HAVE SOMEONE ASSIST YOU WHEN YOU GET UP TO WALK OR USE THE BATHROOM.    YOU SHOULD REST AND RELAX FOR THE NEXT 24 HOURS AND YOU MUST MAKE ARRANGEMENTS TO HAVE SOMEONE STAY WITH YOU FOR AT LEAST 24 HOURS AFTER YOUR DISCHARGE.  AVOID HAZARDOUS AND STRENUOUS ACTIVITIES.  DO NOT MAKE IMPORTANT DECISIONS FOR 24 HOURS.    DO NOT DRIVE ANY VEHICLE OR OPERATE MECHANICAL EQUIPMENT FOR 24 HOURS FOLLOWING THE END OF YOUR SURGERY.  EVEN THOUGH YOU MAY FEEL NORMAL, YOUR REACTIONS MAY BE AFFECTED BY THE MEDICATION YOU HAVE RECEIVED.    DO NOT DRINK ALCOHOLIC BEVERAGES FOR 24 HOURS FOLLOWING YOUR SURGERY.    DRINK CLEAR LIQUIDS (APPLE JUICE, GINGER ALE, 7-UP, BROTH, ETC.).  PROGRESS TO YOUR REGULAR DIET AS YOU FEEL ABLE.    YOU MAY HAVE A DRY MOUTH, A SORE THROAT, MUSCLES ACHES OR TROUBLE SLEEPING.  THESE SHOULD GO AWAY AFTER 24 HOURS.    CALL YOUR DOCTOR FOR ANY OF THE FOLLOWING:  SIGNS OF INFECTION (FEVER, GROWING TENDERNESS AT THE SURGERY SITE, A LARGE AMOUNT OF DRAINAGE OR BLEEDING, SEVERE PAIN, FOUL-SMELLING DRAINAGE, REDNESS OR SWELLING.    IT HAS BEEN OVER 8 TO 10 HOURS SINCE SURGERY AND YOU ARE STILL NOT ABLE TO URINATE (PASS WATER).         Pending Results     No orders found from 1/31/2018 to 2/3/2018.            Admission Information     Date & Time Provider Department Dept. Phone    2/2/2018 Santi Villarreal MD [facility] PreOP/PostOP [phone]      Your Vitals Were     Blood Pressure Pulse Temperature Respirations Height Weight    163/91 72 97.5  F (36.4  C) (Temporal) 16 1.676 m (5' 6\") 68 kg (150 lb)    Last Period Pulse " "Oximetry BMI (Body Mass Index)             06/15/2003 100% 24.21 kg/m2         [facility] Information     [facility] lets you send messages to your doctor, view your test results, renew your prescriptions, schedule appointments and more. To sign up, go to [email or web address] . Click on \"Log in\" on the left side of the screen, which will take you to the Welcome page. Then click on \"Sign up Now\" on the right side of the page.     You will be asked to enter the access code listed below, as well as some personal information. Please follow the directions to create your username and password.     Your access code is: [email or web address]  Expires: 4/10/2018  8:44 AM     Your access code will  in 90 days. If you need help or a new code, please call your [place] clinic or [phone].        Care EveryWhere ID     This is your Care EveryWhere ID. This could be used by other organizations to access your [place] medical records  [id number]        Equal Access to Services     CAMACHO ALBERT : Hadii jamaal rosado Soalan, waaxda lugomez, qaybta kaalmagilbert ruiz, navin vance . So [facility] [phone].    ATENCIÓN: Si habla español, tiene a manzo disposición servicios gratuitos de asistencia lingüística. Llame al [phone].    We comply with applicable federal civil rights laws and [place] laws. We do not discriminate on the basis of race, color, national origin, age, disability, sex, sexual orientation, or gender identity.               Review of your medicines      START taking        Dose / Directions    acetaminophen 325 MG tablet   Commonly known as:  TYLENOL   Used for:  Closed Colles' fracture of right radius, sequela        Dose:  650 mg   Take 2 tablets (650 mg) by mouth every 4 hours as needed for other (mild pain)   Quantity:  30 tablet   Refills:  0       HYDROcodone-acetaminophen 5-325 MG per tablet   Commonly known as:  NORCO   Used for:  Closed Colles' fracture of right radius, " sequela        Dose:  1-2 tablet   Take 1-2 tablets by mouth every 4 hours as needed for other (Moderate to Severe Pain)   Quantity:  10 tablet   Refills:  0       senna-docusate 8.6-50 MG per tablet   Commonly known as:  SENOKOT-S;PERICOLACE   Used for:  Closed Colles' fracture of right radius, sequela        Dose:  1-2 tablet   Take 1-2 tablets by mouth 2 times daily Take while on oral narcotics to prevent or treat constipation.   Quantity:  10 tablet   Refills:  0            Where to get your medicines      These medications were sent to [facility] [address] [facility] [address] AT [facility] [address], [facility] [address]    Hours:  24-hours Phone:  [phone]     acetaminophen 325 MG tablet    senna-docusate 8.6-50 MG per tablet         Some of these will need a paper prescription and others can be bought over the counter. Ask your nurse if you have questions.     Bring a paper prescription for each of these medications     HYDROcodone-acetaminophen 5-325 MG per tablet                Protect others around you: Learn how to safely use, store and throw away your medicines at www.disposemymeds.org.        Information about OPIOIDS     PRESCRIPTION OPIOIDS: WHAT YOU NEED TO KNOW    Prescription opioids can be used to help relieve moderate to severe pain and are often prescribed following a surgery or injury, or for certain health conditions. These medications can be an important part of treatment but also come with serious risks. It is important to work with your health care provider to make sure you are getting the safest, most effective care.    WHAT ARE THE RISKS AND SIDE EFFECTS OF OPIOID USE?  Prescription opioids carry serious risks of addiction and overdose, especially with prolonged use. An opioid overdose, often marked by slowed breathing can cause sudden death. The use of prescription opioids can have a number of side effects as well, even when taken  as directed:      Tolerance - meaning you might need to take more of a medication for the same pain relief    Physical dependence - meaning you have symptoms of withdrawal when a medication is stopped    Increased sensitivity to pain    Constipation    Nausea, vomiting, and dry mouth    Sleepiness and dizziness    Confusion    Depression    Low levels of testosterone that can result in lower sex drive, energy, and strength    Itching and sweating    RISKS ARE GREATER WITH:    History of drug misuse, substance use disorder, or overdose    Mental health conditions (such as depression or anxiety)    Sleep apnea    Older age (65 years or older)    Pregnancy    Avoid alcohol while taking prescription opioids.   Also, unless specifically advised by your health care provider, medications to avoid include:    Benzodiazepines (such as Xanax or Valium)    Muscle relaxants (such as Soma or Flexeril)    Hypnotics (such as Ambien or Lunesta)    Other prescription opioids    KNOW YOUR OPTIONS:  Talk to your health care provider about ways to manage your pain that do not involve prescription opioids. Some of these options may actually work better and have fewer risks and side effects:    Pain relievers such as acetaminophen, ibuprofen, and naproxen    Some medications that are also used for depression or seizures    Physical therapy and exercise    Cognitive behavioral therapy, a psychological, goal-directed approach, in which patients learn how to modify physical, behavioral, and emotional triggers of pain and stress    IF YOU ARE PRESCRIBED OPIOIDS FOR PAIN:    Never take opioids in greater amounts or more often than prescribed    Follow up with your primary health care provider and work together to create a plan on how to manage your pain.    Talk about ways to help manage your pain that do not involve prescription opioids    Talk about all concerns and side effects    Help prevent misuse and abuse    Never sell or share  prescription opioids    Never use another person's prescription opioids    Store prescription opioids in a secure place and out of reach of others (this may include visitors, children, friends, and family)    Visit www.cdc.gov/drugoverdose to learn about risks of opioid abuse and overdose    If you believe you may be struggling with addiction, tell your health care provider and ask for guidance or call [facility]'s National Helpline at 5-628-178-HELP    LEARN MORE / www.cdc.gov/drugoverdose/prescribing/guideline.html    Safely dispose of unused prescription opioids: Find your local drug take-back programs and more information about the importance of safe disposal at www.doseofreality.mn.gov             Medication List: This is a list of all your medications and when to take them. Check marks below indicate your daily home schedule. Keep this list as a reference.      Medications           Morning Afternoon Evening Bedtime As Needed    acetaminophen 325 MG tablet   Commonly known as:  TYLENOL   Take 2 tablets (650 mg) by mouth every 4 hours as needed for other (mild pain)   Last time this was given:  650 mg on 2/2/2018  4:03 PM                                HYDROcodone-acetaminophen 5-325 MG per tablet   Commonly known as:  NORCO   Take 1-2 tablets by mouth every 4 hours as needed for other (Moderate to Severe Pain)                                senna-docusate 8.6-50 MG per tablet   Commonly known as:  SENOKOT-S;PERICOLACE   Take 1-2 tablets by mouth 2 times daily Take while on oral narcotics to prevent or treat constipation.

## 2018-02-02 NOTE — OP NOTE
Procedure Date: 02/02/2018      DATE OF SERVICE:  02/02/2018      PREOPERATIVE DIAGNOSIS:  Retained right distal radius deep implant.      POSTOPERATIVE DIAGNOSIS:  Retained right distal radius deep implant.      PROCEDURE:  Removal of deep implant, right distal radius.      SURGEON:  Santi Villarreal MD      ASSISTANT:  Rebel Hall PA-C      ANESTHESIA:  General.      ESTIMATED BLOOD LOSS:  Less than 5 mL.      INTRAOPERATIVE COMPLICATIONS:  None apparent.      OPERATIVE INDICATIONS:  The patient is a 57-year-old righthand-dominant female who underwent open reduction internal fixation of a right distal radius fracture with a volar locking plate.  She was doing well, but then subsequently sustained a fall after slipping on the ice and had a hyperextension-type injury.  She had increasing dorsal pain.  She felt a clicking-type sensation in her wrist.  We reviewed the potential for intraarticular screw penetration of one of the smooth locking pegs.  Given the clicking sensation as well as some increasing pain, we reviewed the option of removal of the implant given that her fracture had fully united.  She expressed a good understanding of the risks associated with surgery and elected to proceed.      DESCRIPTION OF PROCEDURE:  The patient was identified in the preoperative holding area and the operative site was marked.  The consent was again reviewed and all questions were answered.  She was taken to the operating room, placed supine on the operative table with the right upper extremity prepped and draped in the usual sterile fashion.  Preoperative antibiotics administered.  Timeout was called to ensure the correct operative site and procedure.  The tourniquet was inflated to 250 mmHg.  Previous longitudinal incision over the volar wrist was again utilized with sharp dissection down through the skin and subcutaneous tissues.  Further dissection through the floor of the FCR tendon sheath.  The pronator quadratus was once  again elevated as an ulnar-based flap to expose the underlying plate.  The distal locking screws were removed followed by removing of the 3 bicortical screws proximal.  The plate was removed without difficulty.  The fracture appeared fully united along the volar cortex.  The wound was then copiously irrigated.  The pronator quadratus was closed with interrupted 2-0 Vicryl sutures, subcutaneous tissues with 3-0 Vicryl, skin with a running Monocryl.  Sterile dressings were applied.  The patient was then awoken from general anesthesia and transferred to postanesthesia care unit in stable condition.  Rebel Hall PA-C was present and scrubbed throughout the case.         MEKA ASHRAF MD             D: 2018   T: 2018   MT: RIGO      Name:     MIGUEL LACKEY   MRN:      [id number]        Account:        [id number]   :      1960           Procedure Date: 2018      Document: [id number]

## 2018-10-12 ENCOUNTER — HOSPITAL ENCOUNTER (INPATIENT)
Facility: CLINIC | Age: 58
LOS: 1 days | Discharge: ANOTHER HOSPITAL | DRG: 281 | End: 2018-10-13
Attending: EMERGENCY MEDICINE | Admitting: INTERNAL MEDICINE
Payer: COMMERCIAL

## 2018-10-12 DIAGNOSIS — I16.1 HYPERTENSIVE EMERGENCY: ICD-10-CM

## 2018-10-12 DIAGNOSIS — I21.4 NSTEMI (NON-ST ELEVATED MYOCARDIAL INFARCTION) (H): ICD-10-CM

## 2018-10-12 LAB
BASOPHILS # BLD AUTO: 0.1 10E9/L (ref 0–0.2)
BASOPHILS NFR BLD AUTO: 1.3 %
DIFFERENTIAL METHOD BLD: NORMAL
EOSINOPHIL # BLD AUTO: 0.2 10E9/L (ref 0–0.7)
EOSINOPHIL NFR BLD AUTO: 2.8 %
ERYTHROCYTE [DISTWIDTH] IN BLOOD BY AUTOMATED COUNT: 13.2 % (ref 10–15)
HCT VFR BLD AUTO: 42.6 % (ref 35–47)
HGB BLD-MCNC: 13.6 G/DL (ref 11.7–15.7)
IMM GRANULOCYTES # BLD: 0 10E9/L (ref 0–0.4)
IMM GRANULOCYTES NFR BLD: 0.2 %
LYMPHOCYTES # BLD AUTO: 1.6 10E9/L (ref 0.8–5.3)
LYMPHOCYTES NFR BLD AUTO: 26.8 %
MCH RBC QN AUTO: 29.1 PG (ref 26.5–33)
MCHC RBC AUTO-ENTMCNC: 31.9 G/DL (ref 31.5–36.5)
MCV RBC AUTO: 91 FL (ref 78–100)
MONOCYTES # BLD AUTO: 0.5 10E9/L (ref 0–1.3)
MONOCYTES NFR BLD AUTO: 8.2 %
NEUTROPHILS # BLD AUTO: 3.7 10E9/L (ref 1.6–8.3)
NEUTROPHILS NFR BLD AUTO: 60.7 %
NRBC # BLD AUTO: 0 10*3/UL
NRBC BLD AUTO-RTO: 0 /100
PLATELET # BLD AUTO: 260 10E9/L (ref 150–450)
RBC # BLD AUTO: 4.68 10E12/L (ref 3.8–5.2)
WBC # BLD AUTO: 6.1 10E9/L (ref 4–11)

## 2018-10-12 PROCEDURE — 84484 ASSAY OF TROPONIN QUANT: CPT | Performed by: EMERGENCY MEDICINE

## 2018-10-12 PROCEDURE — 93005 ELECTROCARDIOGRAM TRACING: CPT

## 2018-10-12 PROCEDURE — 99285 EMERGENCY DEPT VISIT HI MDM: CPT | Mod: 25

## 2018-10-12 PROCEDURE — 85025 COMPLETE CBC W/AUTO DIFF WBC: CPT | Performed by: EMERGENCY MEDICINE

## 2018-10-12 PROCEDURE — 99223 1ST HOSP IP/OBS HIGH 75: CPT | Mod: AI | Performed by: INTERNAL MEDICINE

## 2018-10-12 PROCEDURE — 80048 BASIC METABOLIC PNL TOTAL CA: CPT | Performed by: EMERGENCY MEDICINE

## 2018-10-12 PROCEDURE — 96374 THER/PROPH/DIAG INJ IV PUSH: CPT

## 2018-10-12 RX ORDER — KETOROLAC TROMETHAMINE 15 MG/ML
15 INJECTION, SOLUTION INTRAMUSCULAR; INTRAVENOUS ONCE
Status: COMPLETED | OUTPATIENT
Start: 2018-10-12 | End: 2018-10-13

## 2018-10-12 ASSESSMENT — ENCOUNTER SYMPTOMS
FEVER: 0
MYALGIAS: 1
COUGH: 0

## 2018-10-12 NOTE — IP AVS SNAPSHOT
` `           [place] [address] MEDICAL SURGICAL: [phone]                 INTERAGENCY TRANSFER FORM - NOTES (H&P, Discharge Summary, Consults, Procedures, Therapies)   10/12/2018                    Hospital Admission Date: 10/12/2018  MIGUEL LACKEY   : 1960  Sex: Female        Patient PCP Information     Provider PCP Type    [facility]         History & Physicals      H&P by Ruy Ojeda MD at 10/12/2018 10:20 PM     Author:  Ruy Ojeda MD Service:  Hospitalist Author Type:  Physician    Filed:  10/13/2018  1:51 AM Date of Service:  10/12/2018 10:20 PM Creation Time:  10/13/2018  1:38 AM    Status:  Signed :  Ruy Ojeda MD (Physician)               Hospitalist Admission Note(Formerly [facility])    Name: Miguel Lackey    MRN: [id number]          YOB: 1960    Age: 58 year old  Date of admission: 10/12/2018  Primary care provider: Matilde Christopher              Assessment:       Brief summary of admission assessment:Miguel Lackey is a 58 year old  female without a significant past medical history who presents with chest pain that started Saturday and got worse today.She has associated palpitation and sob.    ED evaluation revealed hypertension with BP of 187/117 ,trop elevated at 0.283 and nonspecific ST changes.Her risk factor for CAD is family history and occasional HTN     Patient's presentation is consistent with NSTEMI and will be  be admitted for further inpatient management to care of Hospitalist service.      Admission diagnoses:      #1.NSTEMI     #2.Hypertensive emergency     #3.Hyokalemia         Comorbid medical conditions:   --High BP occasional and untreated        Plan/MDM:       > Admission Status: Will admit patient to hospitalist service as inpatient as patient likely need over two mid night stays in the hospital.     >Care plan:      Tele admission     Heparin , ASA , bb     ECHO     Cardiology consult     Add TSH to drawn  labs    Replace K     Ativan prn     >Supportive care:Oxygen continued  Pain management: anxiolytics, oral narcotics and IV narcotics  Respiratory therapy    >Diet:Diet advanced    >Activity:Advance activity as tolerated    >Education/Counseling :Discussed treatment plan with the patient    >Consults:Inpatient consult with cardiology    >VTE prophylactic measures:prophylaxis against venous thromboembolism    >Therapies:none       >Additional orders:    --Care plan discussed with the patient/family and agreed to care plan   --Patient will be transferred to care of hospitalist attending for further evaluation and management as appropriate   --Old medical orders reviewed   --imaging result independently reviewed by me     (See orders placed for this visit by me )     - Home medication reviewed and will be continued as appropriate once pharmacy reconciliation is completed         Code Status/Disposition:     >Code Status:Full Code      >Disposition:anticipate discharge to home and Anticipate discharge in 3 days        Disclaimer: This note consists of symbols derived from keyboarding, dictation and/or voice recognition software. As a result, there may be errors in the script that have gone undetected. Please consider this when interpreting information found in this chart.             Chief Complaint:     Chest pain      History is obtained from the patient          History of Present Illness:      This patient is a 58 year old  female with a significant past medical history of no significant PMH who presents with the following condition requiring a hospital admission:        Chest Pain  Desire Moise complains of chest pain. Onset was 1 week ago. Symptoms have resolved but recurred today  since that time. The patient's pain is intermittent. The patient describes the pain as heaviness, pressure, tightness and radiates to the left arm. Patient rates pain as a 5/10 in intensity. Associated symptoms are: dyspnea and  palpitations. Aggravating factors are: none. Alleviating factors are: none. Patient's cardiac risk factors are: family history and hypertension. Patient's risk factors for DVT/PE: none known. Previous cardiac testing: none.              Past Medical History:     Past Medical History:   Diagnosis Date     Diverticulosis             Past Surgical History:     Past Surgical History:   Procedure Laterality Date     GYN SURGERY       HYSTERECTOMY       INCISION OF HYMEN  1980     OPEN REDUCTION INTERNAL FIXATION WRIST Right 9/11/2017    Procedure: OPEN REDUCTION INTERNAL FIXATION WRIST;  Open reduction and internal fixation right intra-articular displaced distal radius fracture;  Surgeon: Santi Villarreal MD;  Location: [facility] OR     REMOVE HARDWARE UPPER EXTREMITY Right 2/2/2018    Procedure: REMOVE HARDWARE UPPER EXTREMITY;  Removal of deep implant, right distal radius;  Surgeon: Santi Villarreal MD;  Location: [facility] OR     [place] teeth surgery  1980             Social History:     Social History   Substance Use Topics     Smoking status: Never Smoker     Smokeless tobacco: Never Used     Alcohol use No             Family History:     Family History   Problem Relation Age of Onset     HEART DISEASE Father      cab     Lipids Father      Cancer Maternal Grandmother      cancer  Breast            Allergies:   No Known Allergies          Medications:        Prior to Admission medications    Medication Sig Last Dose Taking? Auth Provider   acetaminophen (TYLENOL) 325 MG tablet Take 2 tablets (650 mg) by mouth every 4 hours as needed for other (mild pain)   Santi Villarreal MD   HYDROcodone-acetaminophen (NORCO) 5-325 MG per tablet Take 1-2 tablets by mouth every 4 hours as needed for other (Moderate to Severe Pain)   Santi Villarreal MD   senna-docusate (SENOKOT-S;PERICOLACE) 8.6-50 MG per tablet Take 1-2 tablets by mouth 2 times daily Take while on oral narcotics to prevent or treat constipation.   Santi Villarreal MD           Review of Systems:     A Comprehensive greater than 10 system review of systems was carried out.  Pertinent positives and negatives are noted above in HPI.  Otherwise negative for contributory information.           Physical Exam:     Vital signs were reviewed    Temp:  [98  F (36.7  C)] 98  F (36.7  C)  Pulse:  [73] 73  Heart Rate:  [61-76] 75  Resp:  [9-20] 9  BP: (147-187)/() 147/103  SpO2:  [90 %-99 %] 90 %        GEN: awake, alert, cooperative, no apparent distress, oriented x 3    NECK:Supple ,no mass or thyromegaly     HEENT:  Normocephalic/atraumatic, no scleral icterus, no nasal discharge, mouth moist.    CV:  Regular rate and rhythm, no murmur or JVD.  S1 + S2 noted, no S3 or S4.    LUNGS:  Clear to auscultation bilaterally without rales/rhonchi/wheezing/retractions.  Symmetric chest rise on inhalation noted.    ABD:  Active bowel sounds, soft, non-tender/non-distended.  No rebound/guarding/rigidity.    EXT:  No edema.  No cyanosis.  No joint synovitis noted.Lower extremity pulses are normal bilaterally and     LGS: No cervical or axillary lymphadenopathy     SKIN:  Dry to touch, warm ,no exanthems noted in the visualized areas.    Neurologic:Grossly intact,non focal . No acute focal neurologic deficit     Psychaitric exam: Mood and affect normal              Data:       All laboratory and imaging data in the past 24 hours reviewed[MA1.1]     Results for orders placed or performed during the hospital encounter of 10/12/18   XR Chest 2 Views    Narrative    CHEST TWO VIEWS  10/13/2018 12:15 AM     HISTORY:  Chest pain.    COMPARISON: 1/10/2018.      Impression    IMPRESSION: Negative chest. Lungs clear.    AMANDA SULLIVAN MD   CBC with platelets differential   Result Value Ref Range    WBC 6.1 4.0 - 11.0 10e9/L    RBC Count 4.68 3.8 - 5.2 10e12/L    Hemoglobin 13.6 11.7 - 15.7 g/dL    Hematocrit 42.6 35.0 - 47.0 %    MCV 91 78 - 100 fl    MCH 29.1 26.5 - 33.0 pg    MCHC 31.9 31.5 - 36.5 g/dL     RDW 13.2 10.0 - 15.0 %    Platelet Count 260 150 - 450 10e9/L    Diff Method Automated Method     % Neutrophils 60.7 %    % Lymphocytes 26.8 %    % Monocytes 8.2 %    % Eosinophils 2.8 %    % Basophils 1.3 %    % Immature Granulocytes 0.2 %    Nucleated RBCs 0 0 /100    Absolute Neutrophil 3.7 1.6 - 8.3 10e9/L    Absolute Lymphocytes 1.6 0.8 - 5.3 10e9/L    Absolute Monocytes 0.5 0.0 - 1.3 10e9/L    Absolute Eosinophils 0.2 0.0 - 0.7 10e9/L    Absolute Basophils 0.1 0.0 - 0.2 10e9/L    Abs Immature Granulocytes 0.0 0 - 0.4 10e9/L    Absolute Nucleated RBC 0.0    Basic metabolic panel   Result Value Ref Range    Sodium 141 133 - 144 mmol/L    Potassium 3.3 (L) 3.4 - 5.3 mmol/L    Chloride 107 94 - 109 mmol/L    Carbon Dioxide 29 20 - 32 mmol/L    Anion Gap 5 3 - 14 mmol/L    Glucose 100 (H) 70 - 99 mg/dL    Urea Nitrogen 12 7 - 30 mg/dL    Creatinine 0.78 0.52 - 1.04 mg/dL    GFR Estimate 76 >60 mL/min/1.7m2    GFR Estimate If Black >90 >60 mL/min/1.7m2    Calcium 9.2 8.5 - 10.1 mg/dL   Troponin I   Result Value Ref Range    Troponin I ES 0.283 (HH) 0.000 - 0.045 ug/L   UA with Microscopic   Result Value Ref Range    Color Urine Straw     Appearance Urine Clear     Glucose Urine Negative NEG^Negative mg/dL    Bilirubin Urine Negative NEG^Negative    Ketones Urine Negative NEG^Negative mg/dL    Specific Gravity Urine 1.009 1.003 - 1.035    Blood Urine Small (A) NEG^Negative    pH Urine 7.0 5.0 - 7.0 pH    Protein Albumin Urine Negative NEG^Negative mg/dL    Urobilinogen mg/dL 0.0 0.0 - 2.0 mg/dL    Nitrite Urine Negative NEG^Negative    Leukocyte Esterase Urine Small (A) NEG^Negative    Source Unspecified Urine     WBC Urine 1 0 - 5 /HPF    RBC Urine 3 (H) 0 - 2 /HPF    Squamous Epithelial /HPF Urine <1 0 - 1 /HPF    Mucous Urine Present (A) NEG^Negative /LPF   EKG 12 lead   Result Value Ref Range    Interpretation ECG Click View Image link to view waveform and result    EKG 12-lead, tracing only   Result Value Ref  Range    Interpretation ECG Click View Image link to view waveform and result[MA1.2]             Recent Results (from the past 48 hour(s))   XR Chest 2 Views    Narrative    CHEST TWO VIEWS  10/13/2018 12:15 AM     HISTORY:  Chest pain.    COMPARISON: 1/10/2018.      Impression    IMPRESSION: Negative chest. Lungs clear.    AMANDA SULLIVAN MD       EKG results: Normal sinus rhythm. Nonspecific ST abnormality     All imaging studies reviewed by me.         Patient`s old medical records reviewed and case discussed with the ED physician.    ED course-Reviewed[MA1.1]        Revision History        User Key Date/Time User Provider Type Action    > MA1.2 10/13/2018  1:51 AM Ruy Ojeda MD Physician Sign     MA1.1 10/13/2018  1:38 AM Ruy Ojeda MD Physician                   Discharge Summaries     No notes of this type exist for this encounter.         Consult Notes      Consults by Juan Carlos Zuleta MD at 10/13/2018  8:48 AM     Author:  Juan Carlos Zuleta MD Service:  Cardiology Author Type:  Physician    Filed:  10/13/2018  9:12 AM Date of Service:  10/13/2018  8:48 AM Creation Time:  10/13/2018  8:48 AM    Status:  Addendum :  Juan Carlos Zuleta MD (Physician)     Consult Orders:    1. Cardiology IP Consult: Patient to be seen: Routine - within 24 hours; ACS; Consultant may enter orders: Yes [[id number]] ordered by Ruy Ojeda MD at 10/13/18 0156                Full cardiology consultation dictated.  Elevated troponin levels with normal EKG.  Maybe ischemic, SCAD or myocarditis.  Given ongoing, intermittent angina, will need coronary angiography to assess cause.  All risks and benefits defined and accepted including possible PTCA/stenting or CABG.  I have discussed the case with Dr. Sanders of cardiology at [facility]. Will transfer to [facility] this AM.  Will treat with beta blocker, ACE Inhibitor, statin, ASA and IV heparin.  Juan Carlos Zuleta MD, FACC  October 13, 2018 8:56  AM[RK1.1]  [id number][RK1.2]     Revision History        User Key Date/Time User Provider Type Action    > RK1.2 10/13/2018  9:12 AM Juan Carlos Zuleta MD Physician Addend     RK1.1 10/13/2018  8:56 AM Juan Carlos Zuleta MD Physician Sign                     Progress Notes - Physician (Notes from 10/10/18 through 10/13/18)      Progress Notes by Hoda Tapia RN at 10/13/2018  7:14 AM     Author:  Hoda Tapia RN Service:  (none) Author Type:  Registered Nurse    Filed:  10/13/2018  7:40 AM Date of Service:  10/13/2018  7:14 AM Creation Time:  10/13/2018  7:14 AM    Status:  Addendum :  Hoda Tapia RN (Registered Nurse)         353 JG- pt having chest pain. Was 7/10 before nitro. Now pain is a 1/10- refusing more nitro.[EB1.1] Patient also complained of nausea and anti emetics given with some relief.[EB1.2] 12 lead EKG was done. Could you come and read EKG and see patient please. Thank you[EB1.1]    Call back from Dr. Bhagat and MD requested that cardiology be paged to call him back to discuss patient. Will pass onto oncoming nurse.[EB1.2]     Revision History        User Key Date/Time User Provider Type Action    > EB1.2 10/13/2018  7:40 AM Hoda Tapia RN Registered Nurse Addend     EB1.1 10/13/2018  7:14 AM Hoda Tapia RN Registered Nurse Sign            Progress Notes by Hoda Tapia RN at 10/13/2018  6:55 AM     Author:  Hoda Tapia RN Service:  (none) Author Type:  Registered Nurse    Filed:  10/13/2018  7:22 AM Date of Service:  10/13/2018  6:55 AM Creation Time:  10/13/2018  7:17 AM    Status:  Signed :  Hoda Tapia RN (Registered Nurse)         Called into patient room, pt complaining of left chest pain radiating into left arm rating it at a 7 after rolling on right side. One nitroglycerin given and pain went down to a one and contained in left chest with no radiation  into left arm. Patient refused anymore nitroglycerin. 12 lead EKG done after first nitroglycerin  given. MD was paged with new changes. Will continue to monitor.[EB1.1]        Revision History        User Key Date/Time User Provider Type Action    > EB1.1 10/13/2018  7:22 AM Hoda Tapia RN Registered Nurse Sign            Progress Notes by Hoda Tapia RN at 10/13/2018  6:50 AM     Author:  Hoda Tapia RN Service:  (none) Author Type:  Registered Nurse    Filed:  10/13/2018  7:15 AM Date of Service:  10/13/2018  6:50 AM Creation Time:  10/13/2018  6:50 AM    Status:  Addendum :  Hoda Tapia, RN (Registered Nurse)         Lupe HOOD is having chest pain 5/10. She has no nitro orders. Can I get an order for this please? Thank you[EB1.1]    Order received for nitroglycerin.[EB1.2]     Revision History        User Key Date/Time User Provider Type Action    > EB1.2 10/13/2018  7:15 AM Hoda Tapia RN Registered Nurse Addend     EB1.1 10/13/2018  6:50 AM Hoda Tapia RN Registered Nurse Sign            ED Notes by Isaura Alvarenga RN at 10/13/2018  1:37 AM     Author:  Isaura Alvarenga RN Service:  (none) Author Type:  Registered Nurse    Filed:  10/13/2018  1:57 AM Date of Service:  10/13/2018  1:37 AM Creation Time:  10/13/2018  1:37 AM    Status:  Addendum :  Isaura Alvarenga RN (Registered Nurse)         [facility]  ED Nurse Handoff Report    Desire Moise is a 58 year old female   ED Chief complaint: No chief complaint on file.  . ED Diagnosis:   Final diagnoses:   NSTEMI (non-ST elevated myocardial infarction) (H)   Hypertensive emergency     Allergies: No Known Allergies    Code Status: Full Code  Activity level - Baseline/Home:  Independent. Activity Level - Current:   Independent. Lift room needed: No. Bariatric: No   Needed: No   Isolation: No. Infection: Not Applicable.     Vital Signs:   Vitals:    10/13/18 0045 10/13/18 0046 10/13/18 0100 10/13/18 0105   BP: (!) 155/100 (!) 155/100 (!) 153/96    Pulse:  73     Resp:   13 20   Temp:       SpO2:  96% 96% 97% 99%   Weight:       Height:           Cardiac Rhythm:  ,   Cardiac  Cardiac Rhythm: Normal sinus rhythm  Pain level: 0-10 Pain Scale: 0  Patient confused: No. Patient Falls Risk: No.   Elimination Status: Has voided   Patient Report - Initial Complaint: Chest pain for approximately 1 week. Focused Assessment: Desire Moise is a 58 year old female who presents to the emergency department today for evaluation of chest pain. The patient reports she has had intermittent chest pain for the past week. Today she noticed the pressure in her chest tightened and radiated to her left arm. She describes it as a tight pressure. Due to these worsening symptoms she presented to the emergency department today. She noticed for the past week she has had a rapid heart rate, and that later in the day the chest tightness follows. She says that exertion and deep breathes don't worsen her pain. She says it feels as if her heart is skipping beats intermittently. She does note that she has had a rapid heart beat for years, but she doesn't usually notice it like she has this past week. She has a blood pressure cuff at home and says that when her blood pressure rises the tightness in her chest worsens as well. Additionally, she does a family history of heart disease. She endorses night sweats. She denies urinary symptoms, coughing, abdominal pain  and fever.   Tests Performed:[BF1.1]   Labs Ordered and Resulted from Time of ED Arrival Up to the Time of Departure from the ED   BASIC METABOLIC PANEL - Abnormal; Notable for the following:        Result Value    Potassium 3.3 (*)     Glucose 100 (*)     All other components within normal limits   TROPONIN I - Abnormal; Notable for the following:     Troponin I ES 0.283 (*)     All other components within normal limits   ROUTINE UA WITH MICROSCOPIC - Abnormal; Notable for the following:     Blood Urine Small (*)     Leukocyte Esterase Urine Small (*)     RBC Urine 3 (*)     Mucous  Urine Present (*)     All other components within normal limits   CBC WITH PLATELETS DIFFERENTIAL   CBC WITH PLATELETS   PULSE OXIMETRY NURSING   CARDIAC CONTINUOUS MONITORING   PATIENT CARE ORDER   VITAL SIGNS   PERIPHERAL IV CATHETER   PLATELETS MONITORED PER HEPARIN TREATMENT PROTOCOL (FOR MEANINGFUL USE   MEASURE WEIGHT   NOTIFY PHYSICIAN   NOTIFY PHYSICIAN     XR Chest 2 Views   Final Result   IMPRESSION: Negative chest. Lungs clear.      AMANDA SULLIVAN MD[BF1.2]         Abnormal Results: see above.   Treatments provided: see MAR  Family Comments:  present with pt, appropriate interactions  OBS brochure/video discussed/provided to patient:  N/A  ED Medications:   Medications   heparin infusion 25,000 units in 0.45% NaCl 250 mL (750 Units/hr Intravenous New Bag 10/13/18 0131)   morphine (PF) injection 4 mg (not administered)   hydrALAZINE (APRESOLINE) injection 5 mg (not administered)   lidocaine (viscous) (XYLOCAINE) 2 % 15 mL, alum & mag hydroxide-simethicone (MYLANTA ES/MAALOX  ES) 15 mL GI Cocktail (30 mLs Oral Given 10/13/18 0000)   ketorolac (TORADOL) injection 15 mg (15 mg Intravenous Given 10/13/18 0000)   nitroGLYcerin (NITROSTAT) sublingual tablet 0.4 mg (0.4 mg Sublingual Given 10/13/18 0105)   aspirin tablet 325 mg (325 mg Oral Given 10/13/18 0038)   heparin Loading Dose bolus dose from infusion pump 3,800 Units (3,800 Units Intravenous Given 10/13/18 0131)     Drips infusing:  Yes-Heparin  For the majority of the shift, the patient's behavior Green. Interventions performed were none.     Severe Sepsis OR Septic Shock Diagnosis Present: No      ED Nurse Name/Phone Number: Julián Cardenas,   1:37 AM[BF1.1]    RECEIVING UNIT ED HANDOFF REVIEW    Above ED Nurse Handoff Report was reviewed: Yes  Reviewed by: Isaura Alvarenga on October 13, 2018 at 1:57 AM[HL1.1]            Revision History        User Key Date/Time User Provider Type Action    > HL1.1 10/13/2018  1:57 AM Isaura Alvarenga, RN  Registered Nurse Addend     BF1.2 10/13/2018  1:39 AM Julián Cardenas RN Registered Nurse Sign     BF1.1 10/13/2018  1:37 AM Julián Cardenas RN Registered Nurse             ED Notes by Karla Baxter RN at 10/13/2018  1:50 AM     Author:  Karla Baxter RN Service:  (none) Author Type:  Registered Nurse    Filed:  10/13/2018  1:51 AM Date of Service:  10/13/2018  1:50 AM Creation Time:  10/13/2018  1:51 AM    Status:  Signed :  Karla Baxter RN (Registered Nurse)         Requested to use restroom. Ambulated well without assistance.[LG1.1]      Revision History        User Key Date/Time User Provider Type Action    > LG1.1 10/13/2018  1:51 AM Karla Baxter RN Registered Nurse Sign            ED Provider Notes by Sy Oviedo MD at 10/12/2018 10:20 PM     Author:  Sy Oviedo MD Service:  Emergency Medicine Author Type:  Physician    Filed:  10/13/2018  1:50 AM Date of Service:  10/12/2018 10:20 PM Creation Time:  10/12/2018 10:54 PM    Status:  Signed :  Sy Oviedo MD (Physician)           History     Chief Complaint:[SB1.1]  Chest pain[SB1.2]     HPI[SB1.1]   Desire Moise is a 58 year old female who presents to the emergency department today for evaluation of chest pain. The patient reports she has had intermittent chest pain for the past week. Today she noticed the pressure in her chest tightened and radiated to her left arm. She describes it as a tight pressure. Due to these worsening symptoms she presented to the emergency department today. She noticed for the past week she has had a rapid heart rate, and that later in the day the chest tightness follows. She says that exertion and deep breathes don't worsen her pain. She says it feels as if her heart is skipping beats intermittently. She does note that she has had a rapid heart beat for years, but she doesn't usually notice it like she has this past week. She has a blood pressure cuff at home and says that  "when her blood pressure rises the tightness in her chest worsens as well. Additionally, she does a family history of heart disease. She endorses night sweats. She denies urinary symptoms, coughing, abdominal pain  and fever.[SB1.3]        Allergies:[SB1.1]  No Known Drug Allergies[SB1.3]        Medications:[SB1.1]    HYDROcodone-acetaminophen (NORCO) 5-325 MG per tablet  senna-docusate (SENOKOT-S;PERICOLACE) 8.6-50 MG per tablet[SB1.3]      Past Medical History:[SB1.1]    Diverticulosis[SB1.3]      Past Surgical History:[SB1.1]    GYN surgery  Hysterectomy  Incision of hymen  Open reduction internal fixation  Remove hardware upper extremity[SB1.3]      Family History:[SB1.1]    Heart disease      Social History:  The patient was accompanied to the ED by .  Smoking Status: Never Smoker  Smokeless Tobacco: Never Used  Alcohol Use: No[SB1.3]   Marital Status:   [2]       Review of Systems   Constitutional: Negative for[SB1.1] chills[EA1.1] and[SB1.1] fever[SB1.3].   Respiratory: Positive for[SB1.1] chest tightness[EA1.1]. Negative for[SB1.1] cough[SB1.3] and[SB1.1] shortness of breath[EA1.1].    Cardiovascular: Positive for[SB1.1] chest pain[SB1.3] and[SB1.1] palpitations[EA1.1]. Negative for[SB1.1] leg swelling[EA1.1].   Gastrointestinal: Negative for[SB1.1] abdominal pain[EA1.1].   Genitourinary: Positive for[SB1.1] frequency[EA1.1]. Negative for[SB1.1] dysuria[EA1.1] and[SB1.1] urgency[EA1.1].   Musculoskeletal: Positive for[SB1.1] myalgias (left arm)[SB1.3].   Neurological: Negative for[SB1.1] weakness[EA1.1] and[SB1.1] numbness[EA1.1].[SB1.1]   All other systems reviewed and are negative[SB1.3].      Physical Exam   First Vitals:[SB1.1]  BP: (!) 187/117  Pulse: 73  Heart Rate: 69  Temp: 98  F (36.7  C)  Resp: 18  Height: 167.6 cm (5' 6\")  Weight: 68.9 kg (152 lb)  SpO2: 99 %[EA1.2]      Physical Exam[SB1.1]  General: Alert, no acute distress; nontoxic appearing; no acute distress  Neuro:  PERRL.  " EOMI.  Gait stable, no focal deficits  HEENT:  Moist mucous membranes.  Conjunctiva normal.   CV:  RRR, no m/r/g, skin warm and well perfused  Pulm:  CTAB, no wheezes/ronchi/rales.  No acute distress, breathing comfortably  GI:  Soft, nontender, nondistended.  No rebound or guarding.  Normal bowel sounds  MSK:  Moving all extremities.  No focal areas of edema, erythema, or tenderness  Skin:  WWP, no rashes, no lower extremity edema, skin color normal, no diaphoresis  Psych:  Well-appearing, normal affect, regular speech[SB1.4]      Emergency Department Course[SB1.1]     1)[SB1.5] ECG:  Indication: chest pain   Completed at[SB1.1] 2228[SB1.6].  Read at[SB1.1] 2335  Normal sinus rhythm. Nonspecific ST abnormality. Abnormal ECG.[SB1.6]  Rate[SB1.1] 73[SB1.6] bpm. GA interval[SB1.1] 128[SB1.6]. QRS duration[SB1.1] 82[SB1.6]. QT/QTc[SB1.1] 398/438[SB1.6]. P-R-T axes[SB1.1] 68 83 64[SB1.6].[SB1.1]     2) ECG:  Indication:[SB1.5] chest pain[SB1.7]   Completed at[SB1.5] 0043[SB1.7].  Read at[SB1.5] 0043[SB1.7].[SB1.5]   Normal Sinus rhythm. Nonspecific ST abnormality. Abnormal ECG.[SB1.7]  Rate[SB1.5] 74[SB1.7] bpm. GA interval[SB1.5] 128[SB1.7]. QRS duration[SB1.5] 82[SB1.7]. QT/QTc[SB1.5] 402/446[SB1.7]. P-R-T axes[SB1.5] 51 64 90[SB1.7].[SB1.5]     Imaging:  Radiology findings were communicated with the patient who voiced understanding of the findings.[SB1.1]    Chest X-Ray. 2 Views:[SB1.6]   IMPRESSION: Negative chest. Lungs clear.[SB1.8]  reading per radiology.[SB1.6]        Laboratory:  Laboratory findings were communicated with the patient who voiced understanding of the findings.    CBC: WBC[SB1.1] 6.1[SB1.8], HGB[SB1.1] 13.6[SB1.8], PLT[SB1.1] 260[SB1.8]  BMP:[SB1.1] Potassium 3.3 (L), Glucose 100 (H)[SB1.8] o/w WNL (Creatinine[SB1.1] 0.78[SB1.8])   Troponin (Collected[SB1.1] 2341[SB1.8]):[SB1.1] 0.283 (HH)     UA:[SB1.8] Straw and Clear. Urine Blood Small, Leukocyte Esterase Urine Small, RBC/HPF 3 (H), Mucous  Urine Present[SB1.5] o/w WNL[SB1.8]      Interventions:[SB1.1]  0000 GI Cocktail 30mLs PO   0000 Toradol 15 mg IV[SB1.8]  0038 Aspirin 325 mg PO  0100 Heparin infusion 0.45% NaCl 250 mL  0105 Nitrostat 0.4 mg Sublingual[SB1.2]       Emergency Department Course:  Nursing notes and vitals reviewed.[SB1.1]  2306[SB1.9] I performed an exam of the patient as documented above.   EKG obtained in the ED, see results above.    IV was inserted and blood was drawn for laboratory testing, results above.[SB1.1]   The patient was sent for a Chest XR while in the emergency department, results above.[SB1.6]    0027 Patient rechecked and updated.[SB1.8]    0117[SB1.2] I spoke with [SB1.8] Rusty[SB1.2] of the Hospitalist service regarding patient's presentation, findings, and plan of care.   I discussed the treatment plan with the patient. They expressed understanding of this plan and consented to admission. I discussed the patient with[SB1.8] Dr. Ojeda[SB1.2], who will admit the patient to a monitored bed for further evaluation and treatment.[SB1.8]   I personally reviewed the laboratory[SB1.1] and imaging[SB1.8] results with the Patient and answered all related questions prior to[SB1.1] admission[SB1.8].    Impression & Plan      Medical Decision Making:[SB1.1]  Desire Moise is a 58 year old female with no significant past medical history presents to the emergency department for evaluation of intermittent chest pain over the last week with worsening chest pain tonight's radiating to the left shoulder.  Noted to be hypertensive with rest of her vital signs are stable.  She otherwise denies any shortness of breath.  Exam was largely unremarkable.  EKG shows slight ST depression in the lateral leads but no ST elevation concerning for STEMI.  Lab workup remarkable for positive troponin.  The rest of her laboratory testing and chest x-ray were normal.  No concern at this point for PE or dissection.  Pain was relieved with 3  sublingual nitro.  IV morphine ordered PRN chest pain.  Repeat EKG obtained showed no dynamic changes.  Patient was given 325 aspirin and started on heparin bolus and drip.  Given the above findings, will admit to medicine under telemetry for further monitoring and cares of NSTEMI and hypertensive emergency.  Patient remained chest pain-free while in the emergency department and she was admitted in stable condition.[EA1.1]      Diagnosis:[SB1.1]    ICD-10-CM    1. NSTEMI (non-ST elevated myocardial infarction) (H) I21.4 UA with Microscopic   2. Hypertensive emergency I16.1[EA1.2]        Disposition:[SB1.1]  Admitted under the supervision of [SB1.8] Rusty[SB1.2]     Scribe Disclosure:  Ju MAZARIEGOS, destin serving as a scribe at 11:03 PM on 10/12/2018 to document services personally performed by Sy Oviedo MD based on my observations and the provider's statements to me.[SB1.3]    Ju Cohen  10/12/2018   [facility] EMERGENCY DEPARTMENT[SB1.1]       Sy Oviedo MD  10/13/18 0150  [EA1.3]     Revision History        User Key Date/Time User Provider Type Action    > EA1.3 10/13/2018  1:50 AM Sy Oviedo MD Physician Sign     EA1.2 10/13/2018  1:49 AM Sy Oviedo MD Physician      EA1.1 10/13/2018  1:45 AM Sy Oviedo MD Physician      SB1.2 10/13/2018  1:20 AM Ju Cohen     SB1.7 10/13/2018  1:00 AM Ju Cohen     SB1.5 10/13/2018 12:39 AM Ju Cohen     SB1.8 10/13/2018 12:30 AM Ju Cohen     [N/A] 10/12/2018 11:31 PM Ju Cohen     SB1.6 10/12/2018 11:11 PM Ju Cohen     [N/A] 10/12/2018 11:08 PM Ju Cohen     SB1.4 10/12/2018 11:07 PM Ju Cohen     SB1.9 10/12/2018 11:06 PM Ju Cohen      [N/A] 10/12/2018 11:03 PM Ju Cohen Share     SB1.3 10/12/2018 10:55 PM Ju Cohen     SB1.1  10/12/2018 10:54 PM Ju Cohen                   Procedure Notes     No notes of this type exist for this encounter.      Progress Notes - Therapies (Notes from 10/10/18 through 10/13/18)     No notes of this type exist for this encounter.

## 2018-10-12 NOTE — IP AVS SNAPSHOT
"    [place] [address] MEDICAL SURGICAL: [phone]                                              INTERAGENCY TRANSFER FORM - LAB / IMAGING / EKG / EMG RESULTS   10/12/2018                    Hospital Admission Date: 10/12/2018  MIGUEL LACKEY   : 1960  Sex: Female        Attending Provider: Ruy Ojeda MD     Allergies:  No Known Allergies    Infection:  None   Service:  GENERAL [facility]    Ht:  1.676 m (5' 6\")   Wt:  69.4 kg (153 lb 1.6 oz)   Admission Wt:  68.9 kg (152 lb)    BMI:  24.71 kg/m 2   BSA:  1.8 m 2            Patient PCP Information     Provider PCP Type    [facility]         Lab Results - 3 Days      Heparin 10a Level [[id number]]  Resulted: 10/13/18 0937, Result status: Final result    Ordering provider: Madeline Cunha [facility]  10/13/18 0839 Resulting lab: [facility]    Specimen Information    Type Source Collected On   Blood  10/13/18 0919          Components       Value Reference Range Flag Lab   Heparin 10A Level 0.30 IU/mL  [facility]   Comment:         Therapeutic Range:  UFH: 0.15-0.35 IU/mL for low intensity dosing       0.30-0.70 IU/mL for high intensity dosing       for DVT and PE  Enoxaparin:       If administered once daily with dose       1.5 mg/k.0-2.0 IU/mL       If administered twice daily with dose       1 mg/k.60-1.0 IU/mL  This test is not validated for other direct factor X inhibitors (e.g.   rivaroxaban, apixaban, edoxaban, betrixaban, fondaparinux) and should not be   used for monitoring of other medications.              Basic metabolic panel [[id number]] (Abnormal)  Resulted: 10/13/18 0901, Result status: Final result    Ordering provider: Ruy Ojeda MD  10/13/18 0613 Resulting lab: [facility]    Specimen Information    Type Source Collected On     10/13/18 0613          Components       Value Reference Range Flag Lab   Sodium 140 133 - 144 mmol/L  [facility]   Potassium 4.6 3.4 - 5.3 mmol/L  [facility]   Chloride 110 94 - " 109 mmol/L H FrRdHs   Carbon Dioxide 26 20 - 32 mmol/L  [facility]   Anion Gap 4 3 - 14 mmol/L  [facility]   Glucose 120 70 - 99 mg/dL H FrRdHs   Urea Nitrogen 10 7 - 30 mg/dL  [facility]   Creatinine 0.76 0.52 - 1.04 mg/dL  [facility]   GFR Estimate 78 >60 mL/min/1.7m2  FrRd   Comment:  Non  GFR Calc   GFR Estimate If Black >90 >60 mL/min/1.7m2  FrRd   Comment:  African American GFR Calc   Calcium 9.0 8.5 - 10.1 mg/dL  FrRd            Magnesium [[id number]] (Abnormal)  Resulted: 10/13/18 0901, Result status: Final result    Ordering provider: Ruy Ojeda MD  10/13/18 0613 Resulting lab: [facility]    Specimen Information    Type Source Collected On     10/13/18 0613          Components       Value Reference Range Flag Lab   Magnesium 2.6 1.6 - 2.3 mg/dL H FrRd            Troponin I - Now then in 4 hours x 2 [[id number]] (Abnormal)  Resulted: 10/13/18 0718, Result status: Final result    Ordering provider: Ruy Ojeda MD  10/13/18 0224 Resulting lab: [facility]    Specimen Information    Type Source Collected On   Blood  10/13/18 0613          Components       Value Reference Range Flag Lab   Troponin I ES 1.566 0.000 - 0.045 ug/L [facility]   Comment:         The 99th percentile for upper reference range is 0.045 ug/L.  Troponin values   in the range of 0.045 - 0.120 ug/L may be associated with risks of adverse   clinical events.  Critical result, provider not notified due to previous critical result   notification.              Heparin 10a Level [[id number]]  Resulted: 10/13/18 0658, Result status: Final result    Ordering provider: Ruy Ojeda MD  10/13/18 9089 Resulting lab: [facility]    Specimen Information    Type Source Collected On   Blood  10/13/18 0613          Components       Value Reference Range Flag Lab   Heparin 10A [facility] 0.29 IU[facility]mL  [facility]   Comment:         Therapeutic Range:  UFH: 0.15-0.35 IU/mL for low intensity dosing        0.30-0.70 IU/mL for high intensity dosing       for DVT and PE  Enoxaparin:       If administered once daily with dose       1.5 mg/k.0-2.0 IU/mL       If administered twice daily with dose       1 mg/k.60-1.0 IU/mL  This test is not validated for other direct factor X inhibitors (e.g.   rivaroxaban, apixaban, edoxaban, betrixaban, fondaparinux) and should not be   used for monitoring of other medications.              Troponin I - Now then in 4 hours x 2 [[id number]] (Abnormal)  Resulted: 10/13/18 0456, Result status: Final result    Ordering provider: Ruy Ojeda MD  10/13/18 0224 Resulting lab: [facility]    Specimen Information    Type Source Collected On   Blood  10/13/18 0250          Components       Value Reference Range Flag Lab   Troponin I ES 0.903 0.000 - 0.045 ug/L  [facility]   Comment:         The 99th percentile for upper reference range is 0.045 ug/L.  Troponin values   in the range of 0.045 - 0.120 ug/L may be associated with risks of adverse   clinical events.  Consistent with previous critical result              TSH with free T4 reflex [[id number]]  Resulted: 10/13/18 0333, Result status: Final result    Ordering provider: Ruy Ojeda MD  10/13/18 0224 Resulting lab: [facility]    Specimen Information    Type Source Collected On   Blood  10/13/18 0250          Components       Value Reference Range Flag Lab   TSH 1.42 0.40 - 4.00 mU/L  FrRdHs            CBC with platelets [[id number]]  Resulted: 10/13/18 0306, Result status: Final result    Ordering provider: Ruy Ojeda MD  10/13/18 0224 Resulting lab: [facility]    Specimen Information    Type Source Collected On   Blood  10/13/18 0250          Components       Value Reference Range Flag Lab   WBC 6.5 4.0 - 11.0 10e9/L  Fr[facility]   RBC Count 4.17 3.8 - 5.2 10e12/L  [facility]   Hemoglobin 12.0 11.7 - 15.7 g/dL  [facility]   Hematocrit 37.7 35.0 - 47.0 %  FrRd[facility]   MCV 90 78 - 100 fl  FrRdHs    MCH 28.8 26.5 - 33.0 pg  FrRdHs   MCHC 31.8 31.5 - 36.5 g/dL  [facility]   RDW 13.2 10.0 - 15.0 %  FrRdHs   Platelet Count 229 150 - 450 10e9/L  FrRdHs            UA with Microscopic [[id number]] (Abnormal)  Resulted: 10/13/18 0034, Result status: Final result    Ordering provider: Sy Oviedo MD  10/13/18 0013 Resulting lab: [facility]    Specimen Information    Type Source Collected On   Unspecified Urine Urine clean catch 10/13/18 0012          Components       Value Reference Range Flag Lab   Color Urine Straw   FrRdHs   Appearance Urine Clear   FrRdHs   Glucose Urine Negative NEG^Negative mg/dL  FrRdHs   Bilirubin Urine Negative NEG^Negative  FrRdHs   Ketones Urine Negative NEG^Negative mg/dL  FrRdHs   Specific Gravity Urine 1.009 1.003 - 1.035  FrRdHs   Blood Urine Small NEG^Negative A FrRdHs   pH Urine 7.0 5.0 - 7.0 pH  FrRdHs   Protein Albumin Urine Negative NEG^Negative mg/dL  Fr[facility]   Urobilinogen mg/dL 0.0 0.0 - 2.0 mg/dL  Fr[facility]   Nitrite Urine Negative NEG^Negative  FrRdHs   Leukocyte Esterase Urine Small NEG^Negative A FrRdHs   Source Unspecified Urine   FrRdHs   WBC Urine 1 0 - 5 /HPF  FrRdHs   RBC Urine 3 0 - 2 /HPF H FrRdHs   Squamous Epithelial /HPF Urine <1 0 - 1 /HPF  FrRdHs   Mucous Urine Present NEG^Negative /LPF A FrRdHs            Troponin I [[id number]] (Abnormal)  Resulted: 10/13/18 0023, Result status: Final result    Ordering provider: Sy Oviedo MD  10/12/18 8054 Resulting lab: [facility]    Specimen Information    Type Source Collected On   Blood  10/12/18 2341          Components       Value Reference Range Flag Lab   Troponin I ES 0.283 0.000 - 0.045 ug/L [facility]   Comment:         The 99th percentile for upper reference range is 0.045 ug/L.  Troponin values   in the range of 0.045 - 0.120 ug/L may be associated with risks of adverse   clinical events.  Critical Value called to and read back by  DAVID LE 0019 10.13.18 R               Basic metabolic panel [[id number]] (Abnormal)  Resulted: 10/13/18 0014, Result status: Final result    Ordering provider: Sy Oviedo MD  10/12/18 2309 Resulting lab: [facility]    Specimen Information    Type Source Collected On   Blood  10/12/18 2341          Components       Value Reference Range Flag Lab   Sodium 141 133 - 144 mmol/L  [facility]   Potassium 3.3 3.4 - 5.3 mmol/L L FrRdHs   Chloride 107 94 - 109 mmol/L  Fr[facility]   Carbon Dioxide 29 20 - 32 mmol/L  [facility]   Anion Gap 5 3 - 14 mmol/L  Fr[facility]   Glucose 100 70 - 99 mg/dL H FrRdHs   Urea Nitrogen 12 7 - 30 mg/dL  [facility]   Creatinine 0.78 0.52 - 1.04 mg/dL  [facility]   GFR Estimate 76 >60 mL/min/1.7m2  [facility]   Comment:  Non  GFR Calc   GFR Estimate If Black >90 >60 mL/min/1.7m2  FrRdHs   Comment:  African American GFR Calc   Calcium 9.2 8.5 - 10.1 mg/dL  FrRdHs            CBC with platelets differential [[id number]]  Resulted: 10/12/18 2351, Result status: Final result    Ordering provider: Sy Oviedo MD  10/12/18 2309 Resulting lab: [facility]    Specimen Information    Type Source Collected On   Blood  10/12/18 2341          Components       Value Reference Range Flag Lab   WBC 6.1 4.0 - 11.0 10e9/L  Fr[facility]   RBC Count 4.68 3.8 - 5.2 10e12/L  FrRdHs   Hemoglobin 13.6 11.7 - 15.7 g/dL  Fr[facility]   Hematocrit 42.6 35.0 - 47.0 %  FrRdHs   MCV 91 78 - 100 fl  FrRdHs   MCH 29.1 26.5 - 33.0 pg  FrRdHs   MCHC 31.9 31.5 - 36.5 g/dL  Fr[facility]   RDW 13.2 10.0 - 15.0 %  FrRdHs   Platelet Count 260 150 - 450 10e9/L  Fr[facility]dHs   Diff Method Automated Method   FrRdHs   % Neutrophils 60.7 %  FrRdHs   % Lymphocytes 26.8 %  FrRdHs   % Monocytes 8.2 %  FrRdHs   % Eosinophils 2.8 %  FrRdHs   % Basophils 1.3 %  FrRdHs   % Immature Granulocytes 0.2 %  FrRdHs   Nucleated RBCs 0 0 /100  FrRdHs   Absolute Neutrophil 3.7 1.6 - 8.3 10e9/L  FrRdHs   Absolute Lymphocytes 1.6 0.8 - 5.3 10e9/L  FrRdHs   Absolute Monocytes  "0.5 0.0 - 1.3 10e9/L  FrRdHs   Absolute Eosinophils 0.2 0.0 - 0.7 10e9/L  FrRdHs   Absolute Basophils 0.1 0.0 - 0.2 10e9/L  Fr[facility]   Abs Immature Granulocytes 0.0 0 - 0.4 10e9/L  FrRdHs   Absolute Nucleated RBC 0.0   FrRdHs            Testing Performed By     Lab - Abbreviation Name Director Address Valid Date Range    12 - FrRdHs [facility] Unknown [address] [facility] [address] 05/08/15 1057 - Present            Unresulted Labs (24h ago through future)    Start       Ordered    10/16/18 0600  CBC with platelets  (Heparin Treatment, Pharmacy To Dose- [facility])  EVERY THREE DAYS,   STAT     Comments:  Every 3 days while on heparin    10/13/18 0036    10/16/18 0600  CBC with platelets  (Heparin Treatment Protocol, Pharmacy to Dose- [facility])  EVERY THREE DAYS,   Routine     Comments:  Every 3 days while on heparin    10/13/18 0224    10/14/18 0600  Heparin Xa (10a) Level  (Heparin Treatment Protocol, Pharmacy to Dose- [facility])  DAILY,   Routine      10/13/18 0224    10/14/18 0600  Lipid panel reflex to direct LDL  AM DRAW,   Routine     Comments:  Last Lab Result: LDL Cholesterol Calculated (mg/dL)       Date                     Value                 04/16/2007               123              ----------   Question:  Fasting/Non-Fasting  Answer:  Fasting    10/13/18 0224    10/13/18 1200  Troponin I  TIMED - ONCE,   Timed      10/13/18 0725    Unscheduled  Heparin Xa (10a) Level  (Heparin Treatment Protocol, Pharmacy to Dose- [facility])  CONDITIONAL (SPECIFY),   Routine     Comments:  Release order 6 hours after any heparin dose change    10/13/18 0224    Unscheduled  Magnesium  (Magnesium Replacement - Adult - \"High\" - Replacement for all levels less than or equal to 2 mg/dL)  CONDITIONAL (SPECIFY),   Routine     Comments:  Obtain Magnesium Level for these conditions:  *IF no magnesium result within 24 hrs before initiation of order set, draw magnesium level with next lab collect.    *2 HOURS " AFTER last magnesium replacement dose when magnesium replacement given for level less than 1.6  *Next morning after magnesium dose.     Repeat Magnesium Replacement if necessary.    10/13/18 0224         Imaging Results - 3 Days      XR Chest 2 Views [[id number]]  Resulted: 10/13/18 0027, Result status: Final result    Ordering provider: Sy Oviedo MD  10/12/18 2309 Resulted by: Marlon Paz MD    Performed: 10/13/18 0011 - 10/13/18 0015 Resulting lab: RADIOLOGY RESULTS    Narrative:       CHEST TWO VIEWS  10/13/2018 12:15 AM     HISTORY:  Chest pain.    COMPARISON: 1/10/2018.      Impression:       IMPRESSION: Negative chest. Lungs clear.    MARLON PAZ MD      Testing Performed By     Lab - Abbreviation Name Director Address Valid Date Range    104 - Rad Rslts RADIOLOGY RESULTS Unknown Unknown 02/16/05 1553 - Present            Encounter-Level Documents:     There are no encounter-level documents.      Order-Level Documents:     There are no order-level documents.

## 2018-10-12 NOTE — IP AVS SNAPSHOT
"` `           [place] [address] MEDICAL SURGICAL: [phone]                                              INTERAGENCY TRANSFER FORM - NURSING   10/12/2018                    Hospital Admission Date: 10/12/2018  MIGUEL LACKEY   : 1960  Sex: Female        Attending Provider: Ruy Ojeda MD     Allergies:  No Known Allergies    Infection:  None   Service:  GENERAL MEDI    Ht:  1.676 m (5' 6\")   Wt:  69.4 kg (153 lb 1.6 oz)   Admission Wt:  68.9 kg (152 lb)    BMI:  24.71 kg/m 2   BSA:  1.8 m 2            Patient PCP Information     Provider PCP Type    Matilde Christopher General      Current Code Status     Date Active Code Status Order ID Comments User Context       10/13/2018  2:24 AM Full Code [id number]  Ruy Ojeda MD Inpatient       Questions for Current Code Status     Question Answer Comment    Code status determined by: Discussion with patient/legal decision maker       Code Status History     Date Active Date Inactive Code Status Order ID Comments User Context    This patient has a current code status but no historical code status.      Advance Directives        Scanned docmt in ACP Activity?           No scanned doc        Hospital Problems as of 10/13/2018              Priority Class Noted POA    ACS (acute coronary syndrome) (H) Medium  10/13/2018 Yes      Non-Hospital Problems as of 10/13/2018              Priority Class Noted    Pain in joint, pelvic region and thigh Medium  Unknown    Family history of malignant neoplasm of breast Medium  2002    Family history of other cardiovascular diseases Medium  2002    Spina bifida occulta Medium  10/23/2002    Anemia Medium  2007      Immunizations     Name Date      TD (ADULT, 7+) 00          END      ASSESSMENT     Discharge Profile Flowsheet     EXPECTED DISCHARGE     Inspection of bony prominences  Full 10/13/18 0942    Expected Discharge Date  10/16/18 (3.0 d > /home) 10/13/18 9888   Inspection under " "devices  Full 10/13/18 0947    GASTROINTESTINAL (ADULT,PEDIATRIC,OB)     Skin WDL  WDL 10/13/18 0947    GI WDL  WDL 10/13/18 0947   SAFETY      COMMUNICATION ASSESSMENT     Safety WDL  WDL 10/13/18 0947    Patient's communication style  spoken language (English or Bilingual) 10/12/18 2230   All Alarms  none present 10/13/18 0947    SKIN                        Assessment WDL (Within Defined Limits) Definitions           Safety WDL     Effective: 09/28/15    Row Information: <b>WDL Definition:</b> Bed in low position, wheels locked; call light in reach; upper side rails up x 2; ID band on<br> <font color=\"gray\"><i>Item=AS safety wdl>>List=AS safety wdl>>Version=F14</i></font>      Skin WDL     Effective: 09/28/15    Row Information: <b>WDL Definition:</b> Warm; dry; intact; elastic; without discoloration; pressure points without redness<br> <font color=\"gray\"><i>Item=AS skin wdl>>List=AS skin wdl>>Version=F14</i></font>      Vitals     Vital Signs Flowsheet     VITAL SIGNS     Response to Interventions  Absence of nonverbal indicators of pain 10/13/18 0606    Temp  95.4  F (35.2  C) 10/13/18 0622   ANALGESIA SIDE EFFECTS MONITORING      Temp src  Oral 10/13/18 0622   Side Effects Monitoring: Respiratory Quality  R 10/13/18 0606    Resp  16 10/13/18 0622   Side Effects Monitoring: Respiratory Depth  N 10/13/18 0606    Pulse  73 10/13/18 0046   Side Effects Monitoring: Sedation Level  S 10/13/18 0606    Heart Rate  66 10/13/18 0938   HEIGHT AND WEIGHT      Pulse/Heart Rate Source  Monitor 10/13/18 0706   Height  1.676 m (5' 6\") 10/13/18 0237    BP  (!)  161/95 10/13/18 0938   Height Method  Stated 10/13/18 0237    BP Location  Left arm 10/13/18 0706   Weight  69.4 kg (153 lb 1.6 oz) 10/13/18 0225    OXYGEN THERAPY     Weight Method  Standing scale 10/13/18 0238    SpO2  98 % 10/13/18 0622   BSA (Calculated - sq m)  1.8 10/13/18 0237    O2 Device  None (Room air) 10/13/18 0622   BMI (Calculated)  24.76 10/13/18 0237    " PAIN/COMFORT     EKG MONITORING      Patient Currently in Pain  yes 10/13/18 0706   Cardiac Regularity  Regular 10/13/18 0048    Preferred Pain Scale  number (Numeric Rating Pain Scale) 10/13/18 0706   Cardiac Rhythm  NSR 10/13/18 0048    Patient's Stated Pain Goal  No pain 10/13/18 0706   POSITIONING      0-10 Pain Scale  1 10/13/18 0706   Body Position  independently positioning 10/13/18 0947    Pain Location  Chest 10/13/18 0706   Head of Bed (HOB)  HOB at 20-30 degrees 10/13/18 0947    Pain Orientation  Left 10/13/18 0706   DAILY CARE      Pain Descriptors  Pressure 10/13/18 0654   Activity Management  activity adjusted per tolerance 10/13/18 0947    Pain Intervention(s)  Medication (See eMAR) 10/13/18 0521                 Patient Lines/Drains/Airways Status    Active LINES/DRAINS/AIRWAYS     Name: Placement date: Placement time: Site: Days: Last dressing change:    Peripheral IV 10/12/18 Right 10/12/18   2336      less than 1     Incision/Surgical Site 09/11/17 Right Wrist 09/11/17   1452    396     Incision/Surgical Site 02/02/18 Right Wrist 02/02/18   1438    252             Patient Lines/Drains/Airways Status    Active PICC/CVC     None            Intake/Output Detail Report     None      Last Void/BM       Most Recent Value    Urine Occurrence 1 at 10/13/2018 0500    Stool Occurrence       Case Management/Discharge Planning     Case Management/Discharge Planning Flowsheet     LIVING ENVIRONMENT     QUESTION TO PATIENT:  Has a member of your family or a partner(now or in the past) intimidated, hurt, manipulated, or controlled you in any way?  no 10/12/18 2232    Lives With  spouse;child(ananda), dependent 10/13/18 0233   QUESTION TO PATIENT: Do you feel safe going back to the place where you are living?  yes 10/12/18 2232    COPING/STRESS     OBSERVATION: Is there reason to believe there has been maltreatment of a vulnerable adult (ie. Physical/Sexual/Emotional abuse, self neglect, lack of adequate food,  shelter, medical care, or financial exploitation)?  no 10/12/18 2232    Major Change/Loss/Stressor  none 10/13/18 0230   OTHER      EXPECTED DISCHARGE     Are you depressed or being treated for depression?  No 10/13/18 0234    Expected Discharge Date  10/16/18 (3.0 d > /home) 10/13/18 0755   HOMICIDE RISK      ABUSE RISK SCREEN     Feels Like Hurting Others  no 10/12/18 2232

## 2018-10-12 NOTE — IP AVS SNAPSHOT
` ` Patient Information     Patient Name Sex     Miguel Moise ([id number]) Female 1960       Room Bed    0353 0353-01      Patient Demographics     Address Phone    [address]  [place] [address] [phone] (Home)  none (Work)  [phone] (Mobile)      Patient Ethnicity & Race     Ethnic Group Patient Race    American White      Emergency Contact(s)     Name Relation Home Work Mobile    Nakul Moise Spouse [phone] none [phone]      Documents on File        Status Date Received Description       Documents for the Patient    Privacy Notice - [facility]  03     Face Sheet  03     Insurance Card  07     Face Sheet  07     Privacy Notice - [place] Received 11     External Medication Information Consent       Patient ID Received 10/20/11     Consent for Services - Hospital/Clinic Received () 10/20/11     Consent for Services - Hospital/Clinic       Insurance Card Received 10/20/11     Insurance Card       HIM LEONIDES Authorization   [facility], [place], [place]    Consent for EHR Access  () 13 Copied from existing Consent for services - C/HOD collected on 10/20/2011    [facility] Specified Other       Consent for Services - Hospital/Clinic Received () 13     HIM LEONIDES Authorization - File Only  13 [facility] 13    Insurance Card Received 04/04/15 [facility] - SAL/HMR ACTIVE    Patient ID Received 04/04/15 [place] EXP. 04/10/2018    Consent for Services - Hospital/Clinic Received () 04/04/15     Consent for EHR Access Received 04/07/15     HIM LEONIDES Authorization - File Only  08/19/15 MIGUEL MOISE    External Medication Information Consent Accepted 10/21/15     Consent for Services/Privacy Notice - Hospital/Clinic Received () 16     Insurance Card Received 16     Patient ID Received 16     HIM LEONIDES Authorization  17     Insurance Card       Insurance Card Received 17     Patient ID Received  17 [id number]    Consent for Services/Privacy Notice - Hospital/Clinic Received () 17     Care Everywhere Prospective Auth Received 01/10/18     Consent for Services - Hospital and Clinic Received 10/12/18     HIE Auth Received 10/12/18     Insurance Card Received 10/12/18     Patient ID Received 10/12/18        Documents for the Encounter    CMS IM for Patient Signature         Admission Information     Attending Provider Admitting Provider Admission Type Admission Date/Time    Ruy Ojeda MD Abdissa, Mesfin E, MD Emergency 10/12/18  2245    Discharge Date Hospital Service Auth/Cert Status Service Area     General Medicine Incomplete [facility]    Unit Room/Bed Admission Status        3 MEDICAL SURGICAL 0353/0353-01 Admission (Confirmed)       Admission     Complaint    ACS (acute coronary syndrome) (H), ACS (acute coronary syndrome) (H)      Hospital Account     Name Acct ID Class Status Primary Coverage    Desire Moise [id number] Inpatient Open [employer] - [employer]            Guarantor Account (for Hospital Account #[id number])     Name Relation to Pt Service Area Active? Acct Type    Desire Moise Self FCS Yes Personal/Family    Address Phone          [address] [place] [address] [phone](H)  none(O)              Coverage Information (for Hospital Account #[id number])     F/O Payor/Plan Precert #    [employer]ERS/[employer]ERS [place]LEE     Subscriber Subscriber #    Nakul Moise [id number]    Address Phone     [id number]  [place] [address] [phone]

## 2018-10-12 NOTE — IP AVS SNAPSHOT
"    [place] [address] MEDICAL SURGICAL: [phone]                                              INTERAGENCY TRANSFER FORM - PHYSICIAN ORDERS   10/12/2018                    Hospital Admission Date: 10/12/2018  MIGUEL LACKEY   : 1960  Sex: Female        Attending Provider: Ruy Ojeda MD     Allergies:  No Known Allergies    Infection:  None   Service:  GENERAL MEDI    Ht:  1.676 m (5' 6\")   Wt:  69.4 kg (153 lb 1.6 oz)   Admission Wt:  68.9 kg (152 lb)    BMI:  24.71 kg/m 2   BSA:  1.8 m 2            Patient PCP Information     Provider PCP Type    Matilde [facility]      ED Clinical Impression     Diagnosis Description Comment Added By Time Added    NSTEMI (non-ST elevated myocardial infarction) (H) [I21.4] NSTEMI (non-ST elevated myocardial infarction) (H) [I21.4]  Sy Oviedo MD 10/13/2018 12:25 AM    Hypertensive emergency [I16.1] Hypertensive emergency [I16.1]  Sy Oviedo MD 10/13/2018 12:25 AM      Hospital Problems as of 10/13/2018              Priority Class Noted POA    ACS (acute coronary syndrome) (H) Medium  10/13/2018 Yes      Non-Hospital Problems as of 10/13/2018              Priority Class Noted    Pain in joint, pelvic region and thigh Medium  Unknown    Family history of malignant neoplasm of breast Medium  2002    Family history of other cardiovascular diseases Medium  2002    Spina bifida occulta Medium  10/23/2002    Anemia Medium  2007      Code Status History     Date Active Date Inactive Code Status Order ID Comments User Context    This patient has a current code status but no historical code status.         Medication Review      UNREVIEWED medications. Ask your doctor about these medications        Dose / Directions Comments    acetaminophen 325 MG tablet   Commonly known as:  TYLENOL   Used for:  Closed Colles' fracture of right radius, sequela        Dose:  650 mg   Take 2 tablets (650 mg) by mouth every 4 hours as needed " for other (mild pain)   Quantity:  30 tablet   Refills:  0

## 2018-10-12 NOTE — IP AVS SNAPSHOT
` `     [place] [address] MEDICAL SURGICAL: [phone]            Medication Administration Report for Desire Moise as of 10/13/18 1020   Legend:    Given Hold Not Given Due Canceled Entry Other Actions    Time Time (Time) Time  Time-Action       Inactive    Active    Linked        Medications 10/07/18 10/08/18 10/09/18 10/10/18 10/11/18 10/12/18 10/13/18    acetaminophen (TYLENOL) tablet 650 mg  Dose: 650 mg  Freq: EVERY 4 HOURS PRN Route: PO  PRN Reason: mild pain  Start: 10/13/18 0224   Admin Instructions: Maximum acetaminophen dose from all sources = 75 mg/kg/day not to exceed 4 grams/day.    Admin. Amount: 2 tablet (2 × 325 mg tablet)  Last Admin: 10/13/18 0517  Dispense Loc:  [facility]           0517 (325 mg)-Given [C]           Continuing statin from home medication list OR statin order already placed during this visit  Freq: DOES NOT GO TO MAR Route: XX  PRN Reason: other  Start: 10/13/18 0224   Admin Instructions: Continuing statin from home medication list OR statin order already placed during this visit    Dispense Loc: Non Rx dispense               heparin infusion 25,000 units in 0.45% NaCl 250 mL  Rate: 7.5 mL/hr Dose: 750 Units/hr  Freq: CONTINUOUS Route: IV  Last Dose: 750 Units/hr (10/13/18 0425)  Start: 10/13/18 0300   Admin Instructions: Per Pharmacy Heparin Dosing Service CV/Vascular Protocol. Goal range for Hep10a: 0.15 - 0.35.    Last Admin: 10/13/18 0425  Dispense Loc:  Main Pharmacy  Volume: 250 mL           0425 (750 Units/hr)-Rate/Dose Verify           HOLD: nitroGLYcerin IF  Freq: HOLD Route: XX  Start: 10/13/18 0224   End: 10/14/18 1423   Admin Instructions: IF patient has received sildenafil (VIAGRA/REVATIO) within the last 8 hours, avanafil (STENDRA) within the last 8 hours, vardenafil (LEVITRA/STAXYN) within the last 18 hours, or tadalafil (CIALIS/ADCIRCA) within the last 36 hours.  Inform provider if patient has taken one of those medications.    Order specific  "questions:  Medication(s) to hold: Nitroglycerin  Parameter for hold (doses,days,conditions) : Other (see admin instructions)  Surgery or Procedure Date 10/13/2018     Dispense Loc: [facility]               lidocaine (LMX4) kit  Freq: EVERY 1 HOUR PRN Route: Top  PRN Reason: pain  PRN Comment: with VAD insertion or accessing implanted port.  Start: 10/13/18 0224   Admin Instructions: Do NOT give if patient has a history of allergy to any local anesthetic or any \"lilia\" product.   Apply 30 minutes prior to VAD insertion or port access.  MAX Dose:  2.5 g (  of 5 g tube)    Dispense Loc: [facility]               lidocaine 1 % 1 mL  Dose: 1 mL  Freq: EVERY 1 HOUR PRN Route: OTHER  PRN Comment: mild pain with VAD insertion or accessing implanted port  Start: 10/13/18 0224   Admin Instructions: Do NOT give if patient has a history of allergy to any local anesthetic or any \"lilia\" product. MAX dose 1 mL subcutaneous OR intradermal in divided doses.    Admin. Amount: 1 mL  Dispense Loc: [facility] Floor Stock  Volume: 2 mL               lisinopril (PRINIVIL/ZESTRIL) tablet 10 mg  Dose: 10 mg  Freq: DAILY Route: PO  Start: 10/13/18 0900   Admin Instructions: Hold for SBP < 100    Admin. Amount: 1 tablet (1 × 10 mg tablet)  Last Admin: 10/13/18 0937  Dispense Loc:  [facility]           0937 (10 mg)-Given           medication instruction  Freq: CONTINUOUS PRN Route: XX  Start: 10/13/18 0224   Admin Instructions: No IV fluids    Dispense Loc: Non Rx dispense               metoprolol tartrate (LOPRESSOR) tablet 25 mg  Dose: 25 mg  Freq: 2 TIMES DAILY Route: PO  Start: 10/13/18 0900   Admin Instructions: Hold IF Heart Rate less than 55 or IF systolic blood pressure less than 85 mmHg    Admin. Amount: 1 tablet (1 × 25 mg tablet)  Last Admin: 10/13/18 0937  Dispense Loc:  [facility]           0937 (25 mg)-Given       [ ] 2100           nitroGLYcerin (NITROSTAT) sublingual tablet 0.4 mg  Dose: 0.4 mg  Freq: EVERY 5 MIN PRN " Route: SL  PRN Reason: chest pain  Start: 10/13/18 0652   Admin Instructions: Give one tablet SL every 5 minutes PRN for chest pain, up to a total of 3 tablets in 15 minutes for each episode of chest pain. If no relief after third dose, contact MD (inpatient) or 911 (outpatient). Do NOT give NTG IF patient has received sildenafil (VIAGRA/REVATIO) within the last 8 hours, avanafil (STENDRA) within the last 8 hours, vardenafil (LEVITRA/STAXYN) with the last 18 hours, or tadalafil (CIALIS/ADCIRCA) within the last 36 hours. Inform provider if patient has taken one of these medications.  If patient is still having acute angina requiring treatment, an alternative treatment option may be used such as: IV beta-blocker [2.5 mg - 5 mg metoprolol (LOPRESSOR)] if ordered by a provider.<br>    Admin. Amount: 1 tablet (1 × 0.4 mg tablet)  Last Admin: 10/13/18 0655  Dispense Loc: [facility]           0655 (0.4 mg)-Given           ondansetron (ZOFRAN-ODT) ODT tab 4 mg  Dose: 4 mg  Freq: EVERY 6 HOURS PRN Route: PO  PRN Reasons: nausea,vomiting  Start: 10/13/18 0224   Admin Instructions: Do not push through foil backing. Peel back foil and gently remove. Place on tongue immediately. Administration with liquid unnecessary  With dry hands, peel back foil backing and gently remove tablet; do not push oral disintegrating tablet through foil backing; administer immediately on tongue and oral disintegrating tablet dissolves in seconds; then swallow with saliva; liquid not required.    Admin. Amount: 1 tablet (1 × 4 mg tablet)  Dispense Loc: [facility]                      Patient is already receiving anticoagulation with heparin, enoxaparin (LOVENOX), warfarin (COUMADIN)  or other anticoagulant medication  Freq: CONTINUOUS PRN Route: XX  Start: 10/13/18 0224   Dispense Loc: Non Rx dispense               Reason ACE/ARB/ARNI order not selected  Freq: DOES NOT GO TO MAR Route: OTHER  PRN Reason: other  Start: 10/13/18 0224   Admin  Instructions: Not Indicated - Patient has Acute coronary Syndrome - NO Acute MI    Order specific questions:  Reason not prescribed: documented in admin instructions     Dispense Loc:  Main Pharmacy               sodium chloride (PF) 0.9% PF flush 3 mL  Dose: 3 mL  Freq: EVERY 8 HOURS Route: IK  Start: 10/13/18 0230   Admin Instructions: And Q1H PRN, to lock peripheral IV dormant line.    Admin. Amount: 3 mL  Dispense Loc: [facility] Floor Stock  Volume: 4 mL   Current Line: Peripheral IV 10/12/18 Right          (0245)-Not Given       [ ] 1030       [ ] 1830           sodium chloride (PF) 0.9% PF flush 3 mL  Dose: 3 mL  Freq: EVERY 1 HOUR PRN Route: IK  PRN Reason: line flush  PRN Comment: for peripheral IV flush post IV meds  Start: 10/13/18 022   Admin. Amount: 3 mL  Dispense Loc: [facility] Floor Stock  Volume: 4 mL              Future Medications  Medications 10/07/18 10/08/18 10/09/18 10/10/18 10/11/18 10/12/18 10/13/18       aspirin tablet 325 mg  Dose: 325 mg  Freq: DAILY Route: PO  Start: 10/14/18 0900   Admin. Amount: 1 tablet (1 × 325 mg tablet)  Dispense Loc:  [facility]               atorvastatin (LIPITOR) tablet 20 mg  Dose: 20 mg  Freq: EVERY EVENING Route: PO  Start: 10/13/18 2000   Admin. Amount: 1 tablet (1 × 20 mg tablet)  Dispense Loc:  [facility]           [ ]           Completed Medications  Medications 10/07/18 10/08/18 10/09/18 10/10/18 10/11/18 10/12/18 10/13/18         Dose: 325 mg  Freq: ONCE Route: PO  Start: 10/13/18 0025   End: 10/13/18 0038   Admin. Amount: 1 tablet (1 × 325 mg tablet)  Last Admin: 10/13/18 0038  Dispense Loc:  [facility]  Administrations Remainin           0038 (325 mg)-Given             Dose: 3,800 Units  Freq: ONCE Route: IV  Start: 10/13/18 0100   End: 10/13/18 0131   Admin Instructions: Nurse to administer dose from existing infusion. If no infusion bag or syringe for this order is available, contact pharmacist to re-enter medication order.    Admin. Amount: 3,800  Units  Last Admin: 10/13/18 0131  Dispense Loc:  Main Pharmacy  Administrations Remainin   Current Line: Peripheral IV 10/12/18 Right          0131 (3,800 Units)-Given             Dose: 15 mg  Freq: ONCE Route: IV  Start: 10/12/18 2353   End: 10/13/18 0000   Admin Instructions: Can cause pain on injection. Administer through a running maintenance fluid over 1 minute followed by a flush. If patient complains of pain on injection, may dilute 15-30 mg in 5 mL and push over 1 to 2 minutes.    Admin. Amount: 15 mg = 1 mL Conc: 15 mg/mL  Last Admin: 10/13/18 0000  Dispense Loc:  [facility]  Administrations Remainin  Volume: 1 mL   Current Line: Peripheral IV 10/12/18 Right          0000 (15 mg)-Given             Dose: 30 mL  Freq: ONCE Route: PO  Start: 10/12/18 2310   End: 10/13/18 0000   Admin. Amount: 30 mL  Last Admin: 10/13/18 0000  Dispense Loc:  [facility]  Administrations Remainin  Volume: 30 mL   Mixture Administration Information:   Medication Type Amount   lidocaine (viscous) 2 % SOLN Additives 15 mL   alum & mag hydroxide-simethicone 400-400-40 MG/5ML SUSP Additives 15 mL                   0000 (30 mL)-Given             Dose: 0.4 mg  Freq: EVERY 5 MIN PRN Route: SL  PRN Reason: chest pain  Start: 10/13/18 0023   End: 10/13/18 0105   Admin. Amount: 1 tablet (1 × 0.4 mg tablet)  Last Admin: 10/13/18 0105  Dispense Loc:  [facility]  Administrations Remainin           0038 (0.4 mg)-Given       0047 (0.4 mg)-Given       0105 (0.4 mg)-Given          Discontinued Medications  Medications 10/07/18 10/08/18 10/09/18 10/10/18 10/11/18 10/12/18 10/13/18         Dose: 650 mg  Freq: EVERY 4 HOURS PRN Route: RE  PRN Reason: mild pain  Start: 10/13/18 0224   End: 10/13/18 0721   Admin Instructions: Maximum acetaminophen dose from all sources = 75 mg/kg/day not to exceed 4 grams/day.    Admin. Amount: 1 suppository (1 × 650 mg suppository)  Dispense Loc:  [facility]           0721-Med Discontinued         Dose:  30 mL  Freq: EVERY 4 HOURS PRN Route: PO  PRN Reason: indigestion  Start: 10/13/18 0224   End: 10/13/18 0721   Admin Instructions: Shake well.    Admin. Amount: 30 mL  Dispense Loc:  [facility]  Volume: 30 mL           721-Med Discontinued         Dose: 324 mg  Freq: ONCE Route: PO  Start: 10/13/18 0230   End: 10/13/18 0232   Admin Instructions: if not given in ED or by EMS    Admin. Amount: 4 tablet (4 × 81 mg tablet)  Administrations Remainin           0232-Med Discontinued               Rate: 7.5 mL/hr Dose: 750 Units/hr  Freq: CONTINUOUS Route: IV  Last Dose: 750 Units/hr (10/13/18 0244)  Start: 10/13/18 0100   End: 10/13/18 0252   Last Admin: 10/13/18 0244  Dispense Loc:  Main Pharmacy  Volume: 250 mL   Current Line: Peripheral IV 10/12/18 Right          0131 (750 Units/hr)-New Bag       0244 (750 Units/hr)-Rate/Dose Verify       0252-Med Discontinued         Dose: 5 mg  Freq: EVERY 6 HOURS PRN Route: IV  PRN Reason: high blood pressure  PRN Comment: SBP > 180  Start: 10/13/18 0109   End: 10/13/18 0233   Admin Instructions: For ordered IV doses 1-40 mg, give IV Push undiluted over 1 minute.    Admin. Amount: 5 mg = 0.25 mL Conc: 20 mg/mL  Dispense Loc:  [facility]  Infused Over: 1 Minutes  Volume: 0.25 mL           3-Med Discontinued         Dose: 10 mg  Freq: EVERY 2 HOURS PRN Route: IV  PRN Reason: high blood pressure  PRN Comment: give for SBP > 160  Start: 10/13/18 0224   End: 10/13/18 0852   Admin Instructions: Hold for HR < 60  For ordered doses up to 80 mg, give IV Push undiluted. Give each 20 mg over 2 minutes.    Admin. Amount: 10 mg = 2 mL Conc: 5 mg/mL  Dispense Loc:  Main Pharmacy  Volume: 2 mL           52-Med Discontinued         Dose: 0.5 mg  Freq: EVERY 6 HOURS PRN Route: IV  PRN Reason: anxiety  Start: 10/13/18 0224   End: 10/13/18 0721   Admin Instructions: For IV PUSH: Dilute with equal volume of NS. For ordered IV doses 0.1-4 mg give IV Push. Administer each 2mg over 1-5  minutes.    Admin. Amount: 0.5 mg = 0.25 mL Conc: 2 mg/mL  Dispense Loc: [facility]  Volume: 1 mL           0721-Med Discontinued      Or    Dose: 0.5 mg  Freq: EVERY 6 HOURS PRN Route: PO  PRN Reason: anxiety  Start: 10/13/18 0224   End: 10/13/18 0721   Admin. Amount: 1 tablet (1 × 0.5 mg tablet)  Dispense Loc: [facility]           0721-Med Discontinued         Dose: 2 g  Freq: DAILY PRN Route: IV  PRN Reason: magnesium supplementation  Start: 10/13/18 0224   End: 10/13/18 0721   Admin Instructions: For Serum Mg++ 1.6 - 2 mg/dL  Give 2 g and recheck magnesium level next AM.    Admin. Amount: 2 g = 50 mL Conc: 2 g/50 mL  Dispense Loc:  Main Pharmacy  Infused Over: 60 Minutes  Volume: 50 mL           0721-Med Discontinued         Dose: 4 g  Freq: EVERY 4 HOURS PRN Route: IV  PRN Reason: magnesium supplementation  Start: 10/13/18 0224   End: 10/13/18 0721   Admin Instructions: For serum Mg++ less than 1.6 mg/dL  Give 4 g and recheck magnesium level 2 hours after dose, and next AM.    Admin. Amount: 4 g = 100 mL Conc: 4 g/100 mL  Dispense Loc:  Main Pharmacy  Infused Over: 120 Minutes  Volume: 100 mL           0721-Med Discontinued         Dose: 10 mg  Freq: EVERY 6 HOURS PRN Route: PO  PRN Comment: nausea and vomiting  Start: 10/13/18 0224   End: 10/13/18 0721   Admin Instructions: This is Step 3 of nausea and vomiting management.  Give if nausea not resolved 15 minutes after giving prochlorperazine (COMPAZINE).  If nausea not resolved in 15-30 minutes, Notify provider.    Admin. Amount: 2 tablet (2 × 5 mg tablet)  Dispense Loc: [facility]           0721-Med Discontinued      Or    Dose: 10 mg  Freq: EVERY 6 HOURS PRN Route: IV  PRN Comment: nausea and vomiting  Start: 10/13/18 0224   End: 10/13/18 0721   Admin Instructions: This is Step 3 of nausea and vomiting management.  Give if nausea not resolved 15 minutes after giving prochlorperazine (COMPAZINE).  If nausea not resolved in 15-30 minutes, Notify  provider.  Avoid use if patient has full bowel obstruction or perforation. Irritant. For ordered IV doses 1-10 mg, give IV Push undiluted over 2 minutes.    Admin. Amount: 10 mg = 2 mL Conc: 5 mg/mL  Dispense Loc: [facility]  Infused Over: 2 Minutes  Volume: 2 mL           0721-Med Discontinued         Dose: 4 mg  Freq: EVERY 15 MIN PRN Route: IV  PRN Reason: moderate to severe pain  Start: 10/13/18 0104   End: 10/13/18 0233   Admin Instructions: For ordered IV doses 0.1-15 mg give IV Push undiluted over 4-5 minutes.    Admin. Amount: 4 mg  Dispense Loc: [facility]  Infused Over: 4-5 Minutes  Administrations Remaining: 3           0233-Med Discontinued         Dose: 0.1-0.4 mg  Freq: EVERY 2 MIN PRN Route: IV  PRN Reason: opioid reversal  Start: 10/13/18 0224   End: 10/13/18 0852   Admin Instructions: For respiratory rate LESS than or EQUAL to 8.  Partial reversal dose:  0.1 mg titrated q 2 minutes for Analgesia Side Effects Monitoring Sedation Level of 3 (frequently drowsy, arousable, drifts to sleep during conversation).Full reversal dose:  0.4 mg bolus for Analgesia Side Effects Monitoring Sedation Level of 4 (somnolent, minimal or no response to stimulation).  For ordered IV doses 0.1-2mg give IVP. Give each 0.4mg over 15 seconds in emergency situations. For non-emergent situations further dilute in 9mL of NS to facilitate titration of response.    Admin. Amount: 0.1-0.4 mg = 0.25-1 mL Conc: 0.4 mg/mL  Dispense Loc: [facility]  Volume: 1 mL           0852-Med Discontinued         Dose: 4 mg  Freq: EVERY 6 HOURS PRN Route: IV  PRN Reasons: nausea,vomiting  Start: 10/13/18 0224   End: 10/13/18 0721   Admin Instructions: This is Step 1 of nausea and vomiting management.  If nausea not resolved in 15 minutes, go to Step 2 prochlorperazine (COMPAZINE).  Irritant. For ordered IV doses 0.1-4 mg, give IV Push undiluted over 2-5 minutes.    Admin. Amount: 4 mg = 2 mL Conc: 4 mg/2 mL  Last Admin: 10/13/18  0649  Dispense Loc:  [facility]  Infused Over: 2-5 Minutes  Volume: 2 mL   Current Line: Peripheral IV 10/12/18 Right          0649 (4 mg)-Given       0721-Med Discontinued         Dose: 20-40 mEq  Freq: EVERY 2 HOURS PRN Route: ORAL OR FEED  PRN Reason: potassium supplementation  Start: 10/13/18 0224   End: 10/13/18 0721   Admin Instructions: Use if unable to tolerate tablets.    If Serum K+ 3.4-4.0, dose = 20 mEq x1. Recheck K+ level the next AM.  If Serum K+ 3.0-3.3, dose = 60 mEq po total dose (40 mEq x 1 followed in 2 hours by 20 mEq X1). Recheck K+ level 4 hours after dose and the next AM.  If Serum K+ 2.5-2.9, dose = 80 mEq po total dose (40 mEq Q2H x2). Recheck K+ level 4 hours after dose and the next AM.  If Serum K+ less than 2.5, See IV order.  Dissolve packet contents in 4-8 ounces of cold water or juice.    Admin. Amount: 20-40 mEq  Last Admin: 10/13/18 0517  Dispense Loc:  [facility]           0517 (20 mEq)-Given       0721-Med Discontinued         Dose: 10 mEq  Freq: EVERY 1 HOUR PRN Route: IV  PRN Reason: potassium supplementation  Start: 10/13/18 0224   End: 10/13/18 0721   Admin Instructions: Infuse via PERIPHERAL LINE. Use potassium with lidocaine for pain with peripheral administration.  If Serum K+ 3.4-4.0, dose = 10 mEq/hr x2 doses. Recheck K+ level the next AM.  If Serum K+ 3.0-3.3, dose = 10 mEq/hr x4 doses (40 mEq IV total dose). Recheck K+ level 2 hours after dose and the next AM.  If Serum K+ less than 3.0, dose = 10 mEq/hr x6 doses (60 mEq IV total dose). Recheck K+ level 2 hours after dose and the next AM.    Admin. Amount: 10 mEq = 100 mL Conc: 10 mEq/100 mL  Dispense Loc:  Main [facility]  Infused Over: 1 Hours  Volume: 100 mL           0721-Med Discontinued         Dose: 10 mEq  Freq: EVERY 1 HOUR PRN Route: IV  PRN Reason: potassium supplementation  Start: 10/13/18 0224   End: 10/13/18 0721   Admin Instructions: Infuse via PERIPHERAL LINE or CENTRAL LINE. Use for central line  replacement if patient weight less than 65 kg, if patient is on TPN with high potassium content or if unit does not stock 20 mEq bags.  If Serum K+ 3.4-4.0, dose = 10 mEq/hr x2 doses. Recheck K+ level the next AM.  If Serum K+ 3.0-3.3, dose = 10 mEq/hr x4 doses (40 mEq IV total dose). Recheck K+ level 2 hours after dose and the next AM.  If Serum K+ less than 3.0, dose = 10 mEq/hr x6 doses (60 mEq IV total dose). Recheck K+ level 2 hours after dose and the next AM.    Admin. Amount: 10 mEq = 100 mL Conc: 10 mEq/100 mL  Dispense Loc:  Main Pharmacy  Infused Over: 60 Minutes  Volume: 100 mL           0721-Med Discontinued         Dose: 20 mEq  Freq: EVERY 1 HOUR PRN Route: IV  PRN Reason: potassium supplementation  Start: 10/13/18 0224   End: 10/13/18 0721   Admin Instructions: Infuse via CENTRAL LINE Only.  May need EKG if less than 65 kg or on TPN - Max rate is 0.3 mEq/kg/hr for patients not on EKG monitoring.    If Serum K+ 3.4-4.0, dose = 20 mEq/hr x1 doses. Recheck K+ level the next AM.  If Serum K+ 3.0-3.3, dose = 20 mEq/hr x2 doses (40 mEq IV total dose).  Recheck K+ level 2 hours after dose and the next AM.  If Serum K+ less than 3.0, dose = 20 mEq/hr x3 doses (60 mEq IV total dose). Recheck K+ level 2 hours after dose and the next AM.    Admin. Amount: 20 mEq = 50 mL Conc: 20 mEq/50 mL  Dispense Loc:  Main Pharmacy  Volume: 50 mL           0721-Med Discontinued         Dose: 20-40 mEq  Freq: EVERY 2 HOURS PRN Route: PO  PRN Reason: potassium supplementation  Start: 10/13/18 0224   End: 10/13/18 0721   Admin Instructions: Use if able to take PO.   If Serum K+ 3.4-4.0, dose = 20 mEq x1. Recheck K+ level the next AM.  If Serum K+ 3.0-3.3, dose = 60 mEq po total dose (40 mEq x1 followed in 2 hours by 20 mEq x1). Recheck K+ level 4 hours after dose and the next AM.  If Serum K+ 2.5-2.9, dose = 80 mEq po total dose (40 mEq Q2H x2). Recheck K+ level 4 hours after dose and the next AM.  If Serum K+ less than 2.5,  See IV order.  DO NOT CRUSH    Admin. Amount: 1-2 tablet (1-2 × 20 mEq tablet)  Last Admin: 10/13/18 0258  Dispense Loc: [facility]           0258 (40 mEq)-Given       0721-Med Discontinued         Dose: 10 mg  Freq: EVERY 6 HOURS PRN Route: IV  PRN Reasons: nausea,vomiting  Start: 10/13/18 0224   End: 10/13/18 0721   Admin Instructions: This is Step 2 of nausea and vomiting management. Give if nausea not resolved 15 minutes after giving ondansetron (ZOFRAN).  If nausea not resolved in 15 minutes, go to Step 3 metoclopramide (REGLAN), if ordered.  For ordered IV doses 0.1-10 mg, give IV Push undiluted. Each 5mg over 1 minute.    Admin. Amount: 10 mg = 2 mL Conc: 5 mg/mL  Dispense Loc: [facility]  Infused Over: 1-2 Minutes  Volume: 2 mL           0721-Med Discontinued      Or    Dose: 10 mg  Freq: EVERY 6 HOURS PRN Route: PO  PRN Reason: vomiting  Start: 10/13/18 0224   End: 10/13/18 0721   Admin Instructions: This is Step 2 of nausea and vomiting management. Give if nausea not resolved 15 minutes after giving ondansetron (ZOFRAN).  If nausea not resolved in 15 minutes, go to Step 3 metoclopramide (REGLAN), if ordered.    Admin. Amount: 2 tablet (2 × 5 mg tablet)  Dispense Loc: [facility]           0721-Med Discontinued      Or    Dose: 25 mg  Freq: EVERY 12 HOURS PRN Route: RE  PRN Reasons: nausea,vomiting  Start: 10/13/18 0224   End: 10/13/18 0721   Admin Instructions: This is Step 2 of nausea and vomiting management. Give if nausea not resolved 15 minutes after giving ondansetron (ZOFRAN).  If nausea not resolved in 15 minutes, go to Step 3 metoclopramide (REGLAN), if ordered.    Admin. Amount: 1 suppository (1 × 25 mg suppository)  Dispense Loc: [facility]           0721-Med Discontinued    Medications 10/07/18 10/08/18 10/09/18 10/10/18 10/11/18 10/12/18 10/13/18

## 2018-10-12 NOTE — IP AVS SNAPSHOT
MRN:[id number]                      After Visit Summary   10/12/2018    Desire Moise    MRN: [id number]           Thank you!     Thank you for choosing [facility] for your care. Our goal is always to provide you with excellent care. Hearing back from our patients is one way we can continue to improve our services. Please take a few minutes to complete the written survey that you may receive in the mail after you visit. If you would like to speak to someone directly about your visit please contact Patient Relations at [phone]. Thank you!          Patient Information     Date Of Birth          1960        Designated Caregiver       Most Recent Value    Caregiver    Will someone help with your care after discharge? yes    Name of designated caregiver John Moise    Phone number of caregiver     Caregiver address same as pt.      About your hospital stay     You were admitted on:  October 13, 2018 You last received care in the:  [place] [facility] [address] [facility]    You were discharged on:  October 13, 2018       Who to Call     For medical emergencies, please call 911.  For non-urgent questions about your medical care, please call your primary care provider or clinic, [phone]          Attending Provider     Provider Sy Florse MD Emergency Medicine    Ruy Ojeda MD Internal Medicine       Primary Care Provider Office Phone # Fax #    Matilde Christopher [phone]      Pending Results     Date and Time Order Name Status Description    10/13/2018 0036 EKG 12-lead, tracing only Preliminary     10/12/2018 2223 EKG 12 lead Preliminary             Admission Information     Date & Time Provider Department Dept. Phone    10/12/2018 Ruy Ojeda MD [place] [address] [facility] [phone]      Your Vitals Were     Blood Pressure Pulse Temperature Respirations Height Weight    161/95 73 95.4  F (35.2  C)  "(Oral) 16 1.676 m (5' 6\") 69.4 kg (153 lb 1.6 oz)    Last Period Pulse Oximetry BMI (Body Mass Index)             06/15/2003 98% 24.71 kg/m2         [employer] Information     [employer] lets you send messages to your doctor, view your test results, renew your prescriptions, schedule appointments and more. To sign up, go to [email or web address] . Click on \"Log in\" on the left side of the screen, which will take you to the Welcome page. Then click on \"Sign up Now\" on the right side of the page.     You will be asked to enter the access code listed below, as well as some personal information. Please follow the directions to create your username and password.     Your access code is: [email or web address]  Expires: 2019 10:19 AM     Your access code will  in 90 days. If you need help or a new code, please call your [place] clinic or [phone].        Care EveryWhere ID     This is your Care EveryWhere ID. This could be used by other organizations to access your [place] medical records  [id number]        Equal Access to Services     CAMACHO ALBERT AH: Dc Moses, watila linn, qaxiomy kaalmagilbert ruiz, navin mackey. So [facility] [phone].    ATENCIÓN: Si habla español, tiene a manzo disposición servicios gratuitos de asistencia lingüística. Lizbet al [phone].    We comply with applicable federal civil rights laws and [place] laws. We do not discriminate on the basis of race, color, national origin, age, disability, sex, sexual orientation, or gender identity.               Review of your medicines      UNREVIEWED medicines. Ask your doctor about these medicines        Dose / Directions    acetaminophen 325 MG tablet   Commonly known as:  TYLENOL   Used for:  Closed Colles' fracture of right radius, sequela        Dose:  650 mg   Take 2 tablets (650 mg) by mouth every 4 hours as needed for other (mild pain)   Quantity:  30 tablet   Refills:  0                Protect " others around you: Learn how to safely use, store and throw away your medicines at www.disposemymeds.org.             Medication List: This is a list of all your medications and when to take them. Check marks below indicate your daily home schedule. Keep this list as a reference.      Medications           Morning Afternoon Evening Bedtime As Needed    acetaminophen 325 MG tablet   Commonly known as:  TYLENOL   Take 2 tablets (650 mg) by mouth every 4 hours as needed for other (mild pain)   Last time this was given:  325 mg on 10/13/2018  5:17 AM

## 2018-10-13 ENCOUNTER — APPOINTMENT (OUTPATIENT)
Dept: CARDIOLOGY | Facility: CLINIC | Age: 58
DRG: 280 | End: 2018-10-13
Attending: STUDENT IN AN ORGANIZED HEALTH CARE EDUCATION/TRAINING PROGRAM
Payer: COMMERCIAL

## 2018-10-13 ENCOUNTER — HOSPITAL ENCOUNTER (INPATIENT)
Facility: CLINIC | Age: 58
LOS: 1 days | Discharge: HOME | DRG: 280 | End: 2018-10-14
Attending: INTERNAL MEDICINE | Admitting: INTERNAL MEDICINE
Payer: COMMERCIAL

## 2018-10-13 ENCOUNTER — APPOINTMENT (OUTPATIENT)
Dept: GENERAL RADIOLOGY | Facility: CLINIC | Age: 58
DRG: 281 | End: 2018-10-13
Attending: EMERGENCY MEDICINE
Payer: COMMERCIAL

## 2018-10-13 VITALS
WEIGHT: 153.1 LBS | RESPIRATION RATE: 16 BRPM | HEART RATE: 73 BPM | SYSTOLIC BLOOD PRESSURE: 161 MMHG | DIASTOLIC BLOOD PRESSURE: 95 MMHG | TEMPERATURE: 95.4 F | BODY MASS INDEX: 24.6 KG/M2 | OXYGEN SATURATION: 98 % | HEIGHT: 66 IN

## 2018-10-13 DIAGNOSIS — I21.4 NSTEMI (NON-ST ELEVATED MYOCARDIAL INFARCTION) (H): Primary | ICD-10-CM

## 2018-10-13 PROBLEM — I24.9 ACS (ACUTE CORONARY SYNDROME) (H): Status: ACTIVE | Noted: 2018-10-13

## 2018-10-13 LAB
ALBUMIN UR-MCNC: NEGATIVE MG/DL
ANION GAP SERPL CALCULATED.3IONS-SCNC: 4 MMOL/L (ref 3–14)
ANION GAP SERPL CALCULATED.3IONS-SCNC: 5 MMOL/L (ref 3–14)
APPEARANCE UR: CLEAR
BILIRUB UR QL STRIP: NEGATIVE
BUN SERPL-MCNC: 10 MG/DL (ref 7–30)
BUN SERPL-MCNC: 12 MG/DL (ref 7–30)
CALCIUM SERPL-MCNC: 9 MG/DL (ref 8.5–10.1)
CALCIUM SERPL-MCNC: 9.2 MG/DL (ref 8.5–10.1)
CHLORIDE SERPL-SCNC: 107 MMOL/L (ref 94–109)
CHLORIDE SERPL-SCNC: 110 MMOL/L (ref 94–109)
CHOLEST SERPL-MCNC: 209 MG/DL
CO2 SERPL-SCNC: 26 MMOL/L (ref 20–32)
CO2 SERPL-SCNC: 29 MMOL/L (ref 20–32)
COLOR UR AUTO: ABNORMAL
CREAT SERPL-MCNC: 0.76 MG/DL (ref 0.52–1.04)
CREAT SERPL-MCNC: 0.77 MG/DL (ref 0.52–1.04)
CREAT SERPL-MCNC: 0.78 MG/DL (ref 0.52–1.04)
ERYTHROCYTE [DISTWIDTH] IN BLOOD BY AUTOMATED COUNT: 13.2 % (ref 10–15)
GFR SERPL CREATININE-BSD FRML MDRD: 76 ML/MIN/1.7M2
GFR SERPL CREATININE-BSD FRML MDRD: 77 ML/MIN/1.7M2
GFR SERPL CREATININE-BSD FRML MDRD: 78 ML/MIN/1.7M2
GLUCOSE SERPL-MCNC: 100 MG/DL (ref 70–99)
GLUCOSE SERPL-MCNC: 120 MG/DL (ref 70–99)
GLUCOSE UR STRIP-MCNC: NEGATIVE MG/DL
HCT VFR BLD AUTO: 37.7 % (ref 35–47)
HDLC SERPL-MCNC: 83 MG/DL
HGB BLD-MCNC: 12 G/DL (ref 11.7–15.7)
HGB UR QL STRIP: ABNORMAL
KETONES UR STRIP-MCNC: NEGATIVE MG/DL
LDLC SERPL CALC-MCNC: 115 MG/DL
LEUKOCYTE ESTERASE UR QL STRIP: ABNORMAL
LMWH PPP CHRO-ACNC: 0.29 IU/ML
LMWH PPP CHRO-ACNC: 0.3 IU/ML
MAGNESIUM SERPL-MCNC: 2.6 MG/DL (ref 1.6–2.3)
MCH RBC QN AUTO: 28.8 PG (ref 26.5–33)
MCHC RBC AUTO-ENTMCNC: 31.8 G/DL (ref 31.5–36.5)
MCV RBC AUTO: 90 FL (ref 78–100)
MUCOUS THREADS #/AREA URNS LPF: PRESENT /LPF
NITRATE UR QL: NEGATIVE
NONHDLC SERPL-MCNC: 126 MG/DL
PH UR STRIP: 7 PH (ref 5–7)
PLATELET # BLD AUTO: 229 10E9/L (ref 150–450)
PLATELET # BLD AUTO: 232 10E9/L (ref 150–450)
POTASSIUM SERPL-SCNC: 3.3 MMOL/L (ref 3.4–5.3)
POTASSIUM SERPL-SCNC: 4.6 MMOL/L (ref 3.4–5.3)
RBC # BLD AUTO: 4.17 10E12/L (ref 3.8–5.2)
RBC #/AREA URNS AUTO: 3 /HPF (ref 0–2)
SODIUM SERPL-SCNC: 140 MMOL/L (ref 133–144)
SODIUM SERPL-SCNC: 141 MMOL/L (ref 133–144)
SOURCE: ABNORMAL
SP GR UR STRIP: 1.01 (ref 1–1.03)
SQUAMOUS #/AREA URNS AUTO: <1 /HPF (ref 0–1)
TRIGL SERPL-MCNC: 54 MG/DL
TROPONIN I SERPL-MCNC: 0.28 UG/L (ref 0–0.04)
TROPONIN I SERPL-MCNC: 0.9 UG/L (ref 0–0.04)
TROPONIN I SERPL-MCNC: 1.57 UG/L (ref 0–0.04)
TSH SERPL DL<=0.005 MIU/L-ACNC: 1.42 MU/L (ref 0.4–4)
UROBILINOGEN UR STRIP-MCNC: 0 MG/DL (ref 0–2)
WBC # BLD AUTO: 6.5 10E9/L (ref 4–11)
WBC #/AREA URNS AUTO: 1 /HPF (ref 0–5)

## 2018-10-13 PROCEDURE — 80061 LIPID PANEL: CPT | Performed by: STUDENT IN AN ORGANIZED HEALTH CARE EDUCATION/TRAINING PROGRAM

## 2018-10-13 PROCEDURE — 27210795 ZZH PAD DEFIB QUICK CR4

## 2018-10-13 PROCEDURE — 99232 SBSQ HOSP IP/OBS MODERATE 35: CPT | Mod: 25 | Performed by: INTERNAL MEDICINE

## 2018-10-13 PROCEDURE — 25000125 ZZHC RX 250: Performed by: EMERGENCY MEDICINE

## 2018-10-13 PROCEDURE — 99239 HOSP IP/OBS DSCHRG MGMT >30: CPT | Performed by: INTERNAL MEDICINE

## 2018-10-13 PROCEDURE — 36415 COLL VENOUS BLD VENIPUNCTURE: CPT | Performed by: STUDENT IN AN ORGANIZED HEALTH CARE EDUCATION/TRAINING PROGRAM

## 2018-10-13 PROCEDURE — 93005 ELECTROCARDIOGRAM TRACING: CPT

## 2018-10-13 PROCEDURE — C1760 CLOSURE DEV, VASC: HCPCS

## 2018-10-13 PROCEDURE — 25000132 ZZH RX MED GY IP 250 OP 250 PS 637: Performed by: INTERNAL MEDICINE

## 2018-10-13 PROCEDURE — 84484 ASSAY OF TROPONIN QUANT: CPT | Performed by: INTERNAL MEDICINE

## 2018-10-13 PROCEDURE — 21000000 ZZH R&B IMCU HEART CARE

## 2018-10-13 PROCEDURE — 25000125 ZZHC RX 250: Performed by: STUDENT IN AN ORGANIZED HEALTH CARE EDUCATION/TRAINING PROGRAM

## 2018-10-13 PROCEDURE — 80048 BASIC METABOLIC PNL TOTAL CA: CPT | Performed by: INTERNAL MEDICINE

## 2018-10-13 PROCEDURE — 27210787 ZZH MANIFOLD CR2

## 2018-10-13 PROCEDURE — 85027 COMPLETE CBC AUTOMATED: CPT | Performed by: INTERNAL MEDICINE

## 2018-10-13 PROCEDURE — 93458 L HRT ARTERY/VENTRICLE ANGIO: CPT | Mod: 26 | Performed by: INTERNAL MEDICINE

## 2018-10-13 PROCEDURE — 99223 1ST HOSP IP/OBS HIGH 75: CPT | Mod: 25 | Performed by: INTERNAL MEDICINE

## 2018-10-13 PROCEDURE — 25000128 H RX IP 250 OP 636: Performed by: EMERGENCY MEDICINE

## 2018-10-13 PROCEDURE — 99152 MOD SED SAME PHYS/QHP 5/>YRS: CPT | Mod: GC | Performed by: INTERNAL MEDICINE

## 2018-10-13 PROCEDURE — 25000128 H RX IP 250 OP 636: Performed by: INTERNAL MEDICINE

## 2018-10-13 PROCEDURE — 4A023N7 MEASUREMENT OF CARDIAC SAMPLING AND PRESSURE, LEFT HEART, PERCUTANEOUS APPROACH: ICD-10-PCS | Performed by: INTERNAL MEDICINE

## 2018-10-13 PROCEDURE — 93005 ELECTROCARDIOGRAM TRACING: CPT | Mod: 76

## 2018-10-13 PROCEDURE — 82565 ASSAY OF CREATININE: CPT | Performed by: STUDENT IN AN ORGANIZED HEALTH CARE EDUCATION/TRAINING PROGRAM

## 2018-10-13 PROCEDURE — 96375 TX/PRO/DX INJ NEW DRUG ADDON: CPT

## 2018-10-13 PROCEDURE — 25000128 H RX IP 250 OP 636: Performed by: STUDENT IN AN ORGANIZED HEALTH CARE EDUCATION/TRAINING PROGRAM

## 2018-10-13 PROCEDURE — 99152 MOD SED SAME PHYS/QHP 5/>YRS: CPT

## 2018-10-13 PROCEDURE — 93458 L HRT ARTERY/VENTRICLE ANGIO: CPT

## 2018-10-13 PROCEDURE — 81001 URINALYSIS AUTO W/SCOPE: CPT | Performed by: EMERGENCY MEDICINE

## 2018-10-13 PROCEDURE — 36415 COLL VENOUS BLD VENIPUNCTURE: CPT | Performed by: INTERNAL MEDICINE

## 2018-10-13 PROCEDURE — 71046 X-RAY EXAM CHEST 2 VIEWS: CPT

## 2018-10-13 PROCEDURE — 99153 MOD SED SAME PHYS/QHP EA: CPT

## 2018-10-13 PROCEDURE — 25000132 ZZH RX MED GY IP 250 OP 250 PS 637: Performed by: STUDENT IN AN ORGANIZED HEALTH CARE EDUCATION/TRAINING PROGRAM

## 2018-10-13 PROCEDURE — 99222 1ST HOSP IP/OBS MODERATE 55: CPT | Mod: AI | Performed by: INTERNAL MEDICINE

## 2018-10-13 PROCEDURE — 85520 HEPARIN ASSAY: CPT | Performed by: INTERNAL MEDICINE

## 2018-10-13 PROCEDURE — B2151ZZ FLUOROSCOPY OF LEFT HEART USING LOW OSMOLAR CONTRAST: ICD-10-PCS | Performed by: INTERNAL MEDICINE

## 2018-10-13 PROCEDURE — 27210946 ZZH KIT HC TOTES DISP CR8

## 2018-10-13 PROCEDURE — 93010 ELECTROCARDIOGRAM REPORT: CPT | Performed by: INTERNAL MEDICINE

## 2018-10-13 PROCEDURE — 85049 AUTOMATED PLATELET COUNT: CPT | Performed by: STUDENT IN AN ORGANIZED HEALTH CARE EDUCATION/TRAINING PROGRAM

## 2018-10-13 PROCEDURE — 40000275 ZZH STATISTIC RCP TIME EA 10 MIN

## 2018-10-13 PROCEDURE — B2111ZZ FLUOROSCOPY OF MULTIPLE CORONARY ARTERIES USING LOW OSMOLAR CONTRAST: ICD-10-PCS | Performed by: INTERNAL MEDICINE

## 2018-10-13 PROCEDURE — 25000132 ZZH RX MED GY IP 250 OP 250 PS 637: Performed by: EMERGENCY MEDICINE

## 2018-10-13 PROCEDURE — 12000007 ZZH R&B INTERMEDIATE

## 2018-10-13 PROCEDURE — 27211089 ZZH KIT ACIST INJECTOR CR3

## 2018-10-13 PROCEDURE — 83735 ASSAY OF MAGNESIUM: CPT | Performed by: INTERNAL MEDICINE

## 2018-10-13 PROCEDURE — 25000132 ZZH RX MED GY IP 250 OP 250 PS 637

## 2018-10-13 PROCEDURE — 84443 ASSAY THYROID STIM HORMONE: CPT | Performed by: INTERNAL MEDICINE

## 2018-10-13 RX ORDER — METOCLOPRAMIDE HYDROCHLORIDE 5 MG/ML
10 INJECTION INTRAMUSCULAR; INTRAVENOUS EVERY 6 HOURS PRN
Status: DISCONTINUED | OUTPATIENT
Start: 2018-10-13 | End: 2018-10-13

## 2018-10-13 RX ORDER — MORPHINE SULFATE 2 MG/ML
1-2 INJECTION, SOLUTION INTRAMUSCULAR; INTRAVENOUS EVERY 5 MIN PRN
Status: DISCONTINUED | OUTPATIENT
Start: 2018-10-13 | End: 2018-10-13 | Stop reason: HOSPADM

## 2018-10-13 RX ORDER — HYDRALAZINE HYDROCHLORIDE 20 MG/ML
10-20 INJECTION INTRAMUSCULAR; INTRAVENOUS
Status: DISCONTINUED | OUTPATIENT
Start: 2018-10-13 | End: 2018-10-13 | Stop reason: HOSPADM

## 2018-10-13 RX ORDER — POTASSIUM CHLORIDE 7.45 MG/ML
10 INJECTION INTRAVENOUS
Status: DISCONTINUED | OUTPATIENT
Start: 2018-10-13 | End: 2018-10-13 | Stop reason: HOSPADM

## 2018-10-13 RX ORDER — DEXTROSE MONOHYDRATE 25 G/50ML
12.5-5 INJECTION, SOLUTION INTRAVENOUS EVERY 30 MIN PRN
Status: DISCONTINUED | OUTPATIENT
Start: 2018-10-13 | End: 2018-10-13 | Stop reason: HOSPADM

## 2018-10-13 RX ORDER — ONDANSETRON 2 MG/ML
4 INJECTION INTRAMUSCULAR; INTRAVENOUS EVERY 6 HOURS PRN
Status: DISCONTINUED | OUTPATIENT
Start: 2018-10-13 | End: 2018-10-14 | Stop reason: HOSPADM

## 2018-10-13 RX ORDER — NITROGLYCERIN 0.4 MG/1
0.4 TABLET SUBLINGUAL EVERY 5 MIN PRN
Status: DISCONTINUED | OUTPATIENT
Start: 2018-10-13 | End: 2018-10-14 | Stop reason: HOSPADM

## 2018-10-13 RX ORDER — ATORVASTATIN CALCIUM 20 MG/1
20 TABLET, FILM COATED ORAL EVERY EVENING
Status: DISCONTINUED | OUTPATIENT
Start: 2018-10-13 | End: 2018-10-13 | Stop reason: HOSPADM

## 2018-10-13 RX ORDER — LISINOPRIL 10 MG/1
10 TABLET ORAL DAILY
Qty: 30 TABLET | Refills: 0 | Status: ON HOLD | OUTPATIENT
Start: 2018-10-14 | End: 2018-10-13

## 2018-10-13 RX ORDER — BUPIVACAINE HYDROCHLORIDE 2.5 MG/ML
1-10 INJECTION, SOLUTION EPIDURAL; INFILTRATION; INTRACAUDAL
Status: DISCONTINUED | OUTPATIENT
Start: 2018-10-13 | End: 2018-10-13 | Stop reason: HOSPADM

## 2018-10-13 RX ORDER — DIPHENHYDRAMINE HYDROCHLORIDE 50 MG/ML
25-50 INJECTION INTRAMUSCULAR; INTRAVENOUS
Status: DISCONTINUED | OUTPATIENT
Start: 2018-10-13 | End: 2018-10-13 | Stop reason: HOSPADM

## 2018-10-13 RX ORDER — FENTANYL CITRATE 50 UG/ML
25-50 INJECTION, SOLUTION INTRAMUSCULAR; INTRAVENOUS
Status: ACTIVE | OUTPATIENT
Start: 2018-10-13 | End: 2018-10-14

## 2018-10-13 RX ORDER — NITROGLYCERIN 0.4 MG/1
TABLET SUBLINGUAL
Qty: 25 TABLET | Refills: 0 | Status: ON HOLD | DISCHARGE
Start: 2018-10-13 | End: 2018-10-13

## 2018-10-13 RX ORDER — NITROGLYCERIN 20 MG/100ML
.07-2 INJECTION INTRAVENOUS CONTINUOUS PRN
Status: DISCONTINUED | OUTPATIENT
Start: 2018-10-13 | End: 2018-10-13 | Stop reason: HOSPADM

## 2018-10-13 RX ORDER — ONDANSETRON 2 MG/ML
4 INJECTION INTRAMUSCULAR; INTRAVENOUS EVERY 4 HOURS PRN
Status: DISCONTINUED | OUTPATIENT
Start: 2018-10-13 | End: 2018-10-13 | Stop reason: HOSPADM

## 2018-10-13 RX ORDER — NALOXONE HYDROCHLORIDE 0.4 MG/ML
.2-.4 INJECTION, SOLUTION INTRAMUSCULAR; INTRAVENOUS; SUBCUTANEOUS
Status: ACTIVE | OUTPATIENT
Start: 2018-10-13 | End: 2018-10-14

## 2018-10-13 RX ORDER — ATROPINE SULFATE 0.1 MG/ML
0.5 INJECTION INTRAVENOUS EVERY 5 MIN PRN
Status: ACTIVE | OUTPATIENT
Start: 2018-10-13 | End: 2018-10-14

## 2018-10-13 RX ORDER — METOPROLOL TARTRATE 25 MG/1
25 TABLET, FILM COATED ORAL 2 TIMES DAILY
Status: DISCONTINUED | OUTPATIENT
Start: 2018-10-13 | End: 2018-10-13 | Stop reason: HOSPADM

## 2018-10-13 RX ORDER — LISINOPRIL 10 MG/1
10 TABLET ORAL DAILY
Status: DISCONTINUED | OUTPATIENT
Start: 2018-10-13 | End: 2018-10-13 | Stop reason: HOSPADM

## 2018-10-13 RX ORDER — NICARDIPINE HYDROCHLORIDE 2.5 MG/ML
100 INJECTION INTRAVENOUS
Status: DISCONTINUED | OUTPATIENT
Start: 2018-10-13 | End: 2018-10-13 | Stop reason: HOSPADM

## 2018-10-13 RX ORDER — PROTAMINE SULFATE 10 MG/ML
25-100 INJECTION, SOLUTION INTRAVENOUS EVERY 5 MIN PRN
Status: DISCONTINUED | OUTPATIENT
Start: 2018-10-13 | End: 2018-10-13 | Stop reason: HOSPADM

## 2018-10-13 RX ORDER — LIDOCAINE 40 MG/G
CREAM TOPICAL
Status: DISCONTINUED | OUTPATIENT
Start: 2018-10-13 | End: 2018-10-13 | Stop reason: HOSPADM

## 2018-10-13 RX ORDER — METHYLPREDNISOLONE SODIUM SUCCINATE 125 MG/2ML
125 INJECTION, POWDER, LYOPHILIZED, FOR SOLUTION INTRAMUSCULAR; INTRAVENOUS
Status: DISCONTINUED | OUTPATIENT
Start: 2018-10-13 | End: 2018-10-13 | Stop reason: HOSPADM

## 2018-10-13 RX ORDER — SODIUM CHLORIDE 9 MG/ML
INJECTION, SOLUTION INTRAVENOUS CONTINUOUS
Status: ACTIVE | OUTPATIENT
Start: 2018-10-13 | End: 2018-10-13

## 2018-10-13 RX ORDER — IOPAMIDOL 755 MG/ML
85 INJECTION, SOLUTION INTRAVASCULAR ONCE
Status: COMPLETED | OUTPATIENT
Start: 2018-10-13 | End: 2018-10-13

## 2018-10-13 RX ORDER — PHENYLEPHRINE HCL IN 0.9% NACL 1 MG/10 ML
20-100 SYRINGE (ML) INTRAVENOUS
Status: DISCONTINUED | OUTPATIENT
Start: 2018-10-13 | End: 2018-10-13 | Stop reason: HOSPADM

## 2018-10-13 RX ORDER — HYDRALAZINE HYDROCHLORIDE 20 MG/ML
5 INJECTION INTRAMUSCULAR; INTRAVENOUS EVERY 6 HOURS PRN
Status: DISCONTINUED | OUTPATIENT
Start: 2018-10-13 | End: 2018-10-13

## 2018-10-13 RX ORDER — NALOXONE HYDROCHLORIDE 0.4 MG/ML
.1-.4 INJECTION, SOLUTION INTRAMUSCULAR; INTRAVENOUS; SUBCUTANEOUS
Status: DISCONTINUED | OUTPATIENT
Start: 2018-10-13 | End: 2018-10-14 | Stop reason: HOSPADM

## 2018-10-13 RX ORDER — ASPIRIN 325 MG
325 TABLET ORAL DAILY
Qty: 180 TABLET | Refills: 0 | Status: ON HOLD | DISCHARGE
Start: 2018-10-14 | End: 2018-10-13

## 2018-10-13 RX ORDER — NITROGLYCERIN 5 MG/ML
100-200 VIAL (ML) INTRAVENOUS
Status: DISCONTINUED | OUTPATIENT
Start: 2018-10-13 | End: 2018-10-13 | Stop reason: HOSPADM

## 2018-10-13 RX ORDER — METOPROLOL TARTRATE 1 MG/ML
5 INJECTION, SOLUTION INTRAVENOUS EVERY 5 MIN PRN
Status: DISCONTINUED | OUTPATIENT
Start: 2018-10-13 | End: 2018-10-13 | Stop reason: HOSPADM

## 2018-10-13 RX ORDER — METOPROLOL TARTRATE 25 MG/1
25 TABLET, FILM COATED ORAL 2 TIMES DAILY
Qty: 60 TABLET | Refills: 0 | Status: ON HOLD | DISCHARGE
Start: 2018-10-13 | End: 2018-10-13

## 2018-10-13 RX ORDER — NITROGLYCERIN 0.4 MG/1
0.4 TABLET SUBLINGUAL EVERY 5 MIN PRN
Status: DISCONTINUED | OUTPATIENT
Start: 2018-10-13 | End: 2018-10-13 | Stop reason: HOSPADM

## 2018-10-13 RX ORDER — METOCLOPRAMIDE 5 MG/1
10 TABLET ORAL EVERY 6 HOURS PRN
Status: DISCONTINUED | OUTPATIENT
Start: 2018-10-13 | End: 2018-10-13

## 2018-10-13 RX ORDER — POTASSIUM CHLORIDE 7.45 MG/ML
10 INJECTION INTRAVENOUS
Status: DISCONTINUED | OUTPATIENT
Start: 2018-10-13 | End: 2018-10-13

## 2018-10-13 RX ORDER — ACETAMINOPHEN 325 MG/1
325-650 TABLET ORAL EVERY 4 HOURS PRN
Status: DISCONTINUED | OUTPATIENT
Start: 2018-10-13 | End: 2018-10-13

## 2018-10-13 RX ORDER — SODIUM NITROPRUSSIDE 25 MG/ML
100-200 INJECTION INTRAVENOUS
Status: DISCONTINUED | OUTPATIENT
Start: 2018-10-13 | End: 2018-10-13 | Stop reason: HOSPADM

## 2018-10-13 RX ORDER — ACETAMINOPHEN 650 MG/1
650 SUPPOSITORY RECTAL EVERY 4 HOURS PRN
Status: DISCONTINUED | OUTPATIENT
Start: 2018-10-13 | End: 2018-10-13

## 2018-10-13 RX ORDER — ASPIRIN 81 MG/1
81 TABLET ORAL DAILY
Status: DISCONTINUED | OUTPATIENT
Start: 2018-10-14 | End: 2018-10-14 | Stop reason: HOSPADM

## 2018-10-13 RX ORDER — ASPIRIN 325 MG
325 TABLET ORAL
Status: DISCONTINUED | OUTPATIENT
Start: 2018-10-13 | End: 2018-10-13 | Stop reason: HOSPADM

## 2018-10-13 RX ORDER — MORPHINE SULFATE 4 MG/ML
4 INJECTION, SOLUTION INTRAMUSCULAR; INTRAVENOUS
Status: DISCONTINUED | OUTPATIENT
Start: 2018-10-13 | End: 2018-10-13

## 2018-10-13 RX ORDER — MAGNESIUM SULFATE HEPTAHYDRATE 40 MG/ML
4 INJECTION, SOLUTION INTRAVENOUS EVERY 4 HOURS PRN
Status: DISCONTINUED | OUTPATIENT
Start: 2018-10-13 | End: 2018-10-13

## 2018-10-13 RX ORDER — LIDOCAINE HYDROCHLORIDE 10 MG/ML
1-10 INJECTION, SOLUTION EPIDURAL; INFILTRATION; INTRACAUDAL; PERINEURAL
Status: COMPLETED | OUTPATIENT
Start: 2018-10-13 | End: 2018-10-13

## 2018-10-13 RX ORDER — DOBUTAMINE HYDROCHLORIDE 200 MG/100ML
2-20 INJECTION INTRAVENOUS CONTINUOUS PRN
Status: DISCONTINUED | OUTPATIENT
Start: 2018-10-13 | End: 2018-10-13 | Stop reason: HOSPADM

## 2018-10-13 RX ORDER — NALOXONE HYDROCHLORIDE 0.4 MG/ML
.1-.4 INJECTION, SOLUTION INTRAMUSCULAR; INTRAVENOUS; SUBCUTANEOUS
Status: DISCONTINUED | OUTPATIENT
Start: 2018-10-13 | End: 2018-10-13

## 2018-10-13 RX ORDER — CLOPIDOGREL BISULFATE 75 MG/1
75 TABLET ORAL
Status: DISCONTINUED | OUTPATIENT
Start: 2018-10-13 | End: 2018-10-13 | Stop reason: HOSPADM

## 2018-10-13 RX ORDER — NITROGLYCERIN 5 MG/ML
100-500 VIAL (ML) INTRAVENOUS
Status: DISCONTINUED | OUTPATIENT
Start: 2018-10-13 | End: 2018-10-13 | Stop reason: HOSPADM

## 2018-10-13 RX ORDER — POTASSIUM CHLORIDE 29.8 MG/ML
20 INJECTION INTRAVENOUS
Status: DISCONTINUED | OUTPATIENT
Start: 2018-10-13 | End: 2018-10-13

## 2018-10-13 RX ORDER — NITROGLYCERIN 0.4 MG/1
TABLET SUBLINGUAL
Status: COMPLETED
Start: 2018-10-13 | End: 2018-10-13

## 2018-10-13 RX ORDER — METOPROLOL TARTRATE 25 MG/1
25 TABLET, FILM COATED ORAL 2 TIMES DAILY
Status: DISCONTINUED | OUTPATIENT
Start: 2018-10-13 | End: 2018-10-14 | Stop reason: HOSPADM

## 2018-10-13 RX ORDER — LIDOCAINE 40 MG/G
CREAM TOPICAL
Status: DISCONTINUED | OUTPATIENT
Start: 2018-10-13 | End: 2018-10-14 | Stop reason: HOSPADM

## 2018-10-13 RX ORDER — PROCHLORPERAZINE 25 MG
25 SUPPOSITORY, RECTAL RECTAL EVERY 12 HOURS PRN
Status: DISCONTINUED | OUTPATIENT
Start: 2018-10-13 | End: 2018-10-13

## 2018-10-13 RX ORDER — FLUMAZENIL 0.1 MG/ML
0.2 INJECTION, SOLUTION INTRAVENOUS
Status: ACTIVE | OUTPATIENT
Start: 2018-10-13 | End: 2018-10-14

## 2018-10-13 RX ORDER — ASPIRIN 81 MG/1
324 TABLET, CHEWABLE ORAL ONCE
Status: DISCONTINUED | OUTPATIENT
Start: 2018-10-13 | End: 2018-10-13

## 2018-10-13 RX ORDER — ATORVASTATIN CALCIUM 40 MG/1
40 TABLET, FILM COATED ORAL DAILY
Status: DISCONTINUED | OUTPATIENT
Start: 2018-10-13 | End: 2018-10-14 | Stop reason: HOSPADM

## 2018-10-13 RX ORDER — ACETAMINOPHEN 650 MG/1
650 SUPPOSITORY RECTAL EVERY 4 HOURS PRN
Status: DISCONTINUED | OUTPATIENT
Start: 2018-10-13 | End: 2018-10-14 | Stop reason: HOSPADM

## 2018-10-13 RX ORDER — FLUMAZENIL 0.1 MG/ML
0.2 INJECTION, SOLUTION INTRAVENOUS
Status: DISCONTINUED | OUTPATIENT
Start: 2018-10-13 | End: 2018-10-13 | Stop reason: HOSPADM

## 2018-10-13 RX ORDER — ALUMINA, MAGNESIA, AND SIMETHICONE 2400; 2400; 240 MG/30ML; MG/30ML; MG/30ML
30 SUSPENSION ORAL EVERY 4 HOURS PRN
Status: DISCONTINUED | OUTPATIENT
Start: 2018-10-13 | End: 2018-10-14 | Stop reason: HOSPADM

## 2018-10-13 RX ORDER — POTASSIUM CL/LIDO/0.9 % NACL 10MEQ/0.1L
10 INTRAVENOUS SOLUTION, PIGGYBACK (ML) INTRAVENOUS
Status: DISCONTINUED | OUTPATIENT
Start: 2018-10-13 | End: 2018-10-13

## 2018-10-13 RX ORDER — ATROPINE SULFATE 0.1 MG/ML
.5-1 INJECTION INTRAVENOUS
Status: DISCONTINUED | OUTPATIENT
Start: 2018-10-13 | End: 2018-10-13 | Stop reason: HOSPADM

## 2018-10-13 RX ORDER — DOPAMINE HYDROCHLORIDE 160 MG/100ML
2-20 INJECTION, SOLUTION INTRAVENOUS CONTINUOUS PRN
Status: DISCONTINUED | OUTPATIENT
Start: 2018-10-13 | End: 2018-10-13 | Stop reason: HOSPADM

## 2018-10-13 RX ORDER — NIFEDIPINE 10 MG/1
10 CAPSULE ORAL
Status: DISCONTINUED | OUTPATIENT
Start: 2018-10-13 | End: 2018-10-13 | Stop reason: HOSPADM

## 2018-10-13 RX ORDER — POTASSIUM CHLORIDE 1.5 G/1.58G
20-40 POWDER, FOR SOLUTION ORAL
Status: DISCONTINUED | OUTPATIENT
Start: 2018-10-13 | End: 2018-10-13

## 2018-10-13 RX ORDER — LORAZEPAM 2 MG/ML
0.5 INJECTION INTRAMUSCULAR EVERY 6 HOURS PRN
Status: DISCONTINUED | OUTPATIENT
Start: 2018-10-13 | End: 2018-10-13

## 2018-10-13 RX ORDER — LORAZEPAM 2 MG/ML
.5-2 INJECTION INTRAMUSCULAR EVERY 4 HOURS PRN
Status: DISCONTINUED | OUTPATIENT
Start: 2018-10-13 | End: 2018-10-13 | Stop reason: HOSPADM

## 2018-10-13 RX ORDER — EPINEPHRINE 1 MG/ML
0.3 INJECTION, SOLUTION, CONCENTRATE INTRAVENOUS
Status: DISCONTINUED | OUTPATIENT
Start: 2018-10-13 | End: 2018-10-13 | Stop reason: HOSPADM

## 2018-10-13 RX ORDER — HEPARIN SODIUM 1000 [USP'U]/ML
1000-10000 INJECTION, SOLUTION INTRAVENOUS; SUBCUTANEOUS EVERY 5 MIN PRN
Status: DISCONTINUED | OUTPATIENT
Start: 2018-10-13 | End: 2018-10-13 | Stop reason: HOSPADM

## 2018-10-13 RX ORDER — ASPIRIN 325 MG
325 TABLET ORAL DAILY
Status: DISCONTINUED | OUTPATIENT
Start: 2018-10-14 | End: 2018-10-13 | Stop reason: HOSPADM

## 2018-10-13 RX ORDER — ONDANSETRON 4 MG/1
4 TABLET, ORALLY DISINTEGRATING ORAL EVERY 6 HOURS PRN
Status: DISCONTINUED | OUTPATIENT
Start: 2018-10-13 | End: 2018-10-14 | Stop reason: HOSPADM

## 2018-10-13 RX ORDER — PRASUGREL 10 MG/1
10-60 TABLET, FILM COATED ORAL
Status: DISCONTINUED | OUTPATIENT
Start: 2018-10-13 | End: 2018-10-13 | Stop reason: HOSPADM

## 2018-10-13 RX ORDER — POTASSIUM CHLORIDE 1500 MG/1
20-40 TABLET, EXTENDED RELEASE ORAL
Status: DISCONTINUED | OUTPATIENT
Start: 2018-10-13 | End: 2018-10-13

## 2018-10-13 RX ORDER — ENALAPRILAT 1.25 MG/ML
1.25-2.5 INJECTION INTRAVENOUS
Status: DISCONTINUED | OUTPATIENT
Start: 2018-10-13 | End: 2018-10-13 | Stop reason: HOSPADM

## 2018-10-13 RX ORDER — ATORVASTATIN CALCIUM 20 MG/1
20 TABLET, FILM COATED ORAL EVERY EVENING
Qty: 30 TABLET | Refills: 0 | Status: ON HOLD | DISCHARGE
Start: 2018-10-13 | End: 2018-10-13

## 2018-10-13 RX ORDER — HYDROCODONE BITARTRATE AND ACETAMINOPHEN 5; 325 MG/1; MG/1
1-2 TABLET ORAL EVERY 4 HOURS PRN
Status: DISCONTINUED | OUTPATIENT
Start: 2018-10-13 | End: 2018-10-14 | Stop reason: HOSPADM

## 2018-10-13 RX ORDER — FENTANYL CITRATE 50 UG/ML
25-50 INJECTION, SOLUTION INTRAMUSCULAR; INTRAVENOUS
Status: DISCONTINUED | OUTPATIENT
Start: 2018-10-13 | End: 2018-10-13 | Stop reason: HOSPADM

## 2018-10-13 RX ORDER — LIDOCAINE HYDROCHLORIDE 10 MG/ML
30 INJECTION, SOLUTION EPIDURAL; INFILTRATION; INTRACAUDAL; PERINEURAL
Status: DISCONTINUED | OUTPATIENT
Start: 2018-10-13 | End: 2018-10-13 | Stop reason: HOSPADM

## 2018-10-13 RX ORDER — ASPIRIN 325 MG
325 TABLET ORAL ONCE
Status: COMPLETED | OUTPATIENT
Start: 2018-10-13 | End: 2018-10-13

## 2018-10-13 RX ORDER — PROTAMINE SULFATE 10 MG/ML
1-5 INJECTION, SOLUTION INTRAVENOUS
Status: DISCONTINUED | OUTPATIENT
Start: 2018-10-13 | End: 2018-10-13 | Stop reason: HOSPADM

## 2018-10-13 RX ORDER — ONDANSETRON 4 MG/1
4 TABLET, ORALLY DISINTEGRATING ORAL EVERY 6 HOURS PRN
Status: DISCONTINUED | OUTPATIENT
Start: 2018-10-13 | End: 2018-10-13 | Stop reason: HOSPADM

## 2018-10-13 RX ORDER — NITROGLYCERIN 0.4 MG/1
0.4 TABLET SUBLINGUAL EVERY 5 MIN PRN
Status: COMPLETED | OUTPATIENT
Start: 2018-10-13 | End: 2018-10-13

## 2018-10-13 RX ORDER — LISINOPRIL 5 MG/1
5 TABLET ORAL DAILY
Status: DISCONTINUED | OUTPATIENT
Start: 2018-10-14 | End: 2018-10-13

## 2018-10-13 RX ORDER — CLOPIDOGREL BISULFATE 75 MG/1
75 TABLET ORAL DAILY
Status: DISCONTINUED | OUTPATIENT
Start: 2018-10-14 | End: 2018-10-14 | Stop reason: HOSPADM

## 2018-10-13 RX ORDER — ONDANSETRON 2 MG/ML
4 INJECTION INTRAMUSCULAR; INTRAVENOUS EVERY 6 HOURS PRN
Status: DISCONTINUED | OUTPATIENT
Start: 2018-10-13 | End: 2018-10-13

## 2018-10-13 RX ORDER — LORAZEPAM 0.5 MG/1
0.5 TABLET ORAL EVERY 6 HOURS PRN
Status: DISCONTINUED | OUTPATIENT
Start: 2018-10-13 | End: 2018-10-13

## 2018-10-13 RX ORDER — ASPIRIN 81 MG/1
81-324 TABLET, CHEWABLE ORAL
Status: DISCONTINUED | OUTPATIENT
Start: 2018-10-13 | End: 2018-10-13 | Stop reason: HOSPADM

## 2018-10-13 RX ORDER — ADENOSINE 3 MG/ML
12-12000 INJECTION, SOLUTION INTRAVENOUS
Status: DISCONTINUED | OUTPATIENT
Start: 2018-10-13 | End: 2018-10-13 | Stop reason: HOSPADM

## 2018-10-13 RX ORDER — CLOPIDOGREL 300 MG/1
300-600 TABLET, FILM COATED ORAL
Status: COMPLETED | OUTPATIENT
Start: 2018-10-13 | End: 2018-10-13

## 2018-10-13 RX ORDER — ACETAMINOPHEN 325 MG/1
650 TABLET ORAL EVERY 4 HOURS PRN
Status: DISCONTINUED | OUTPATIENT
Start: 2018-10-13 | End: 2018-10-14 | Stop reason: HOSPADM

## 2018-10-13 RX ORDER — ACETAMINOPHEN 325 MG/1
650 TABLET ORAL EVERY 4 HOURS PRN
Status: DISCONTINUED | OUTPATIENT
Start: 2018-10-13 | End: 2018-10-13 | Stop reason: HOSPADM

## 2018-10-13 RX ORDER — LABETALOL HYDROCHLORIDE 5 MG/ML
10 INJECTION, SOLUTION INTRAVENOUS
Status: DISCONTINUED | OUTPATIENT
Start: 2018-10-13 | End: 2018-10-13

## 2018-10-13 RX ORDER — LISINOPRIL 5 MG/1
5 TABLET ORAL DAILY
Status: DISCONTINUED | OUTPATIENT
Start: 2018-10-13 | End: 2018-10-14

## 2018-10-13 RX ORDER — PROCHLORPERAZINE MALEATE 5 MG
10 TABLET ORAL EVERY 6 HOURS PRN
Status: DISCONTINUED | OUTPATIENT
Start: 2018-10-13 | End: 2018-10-13

## 2018-10-13 RX ORDER — FUROSEMIDE 10 MG/ML
20-100 INJECTION INTRAMUSCULAR; INTRAVENOUS
Status: DISCONTINUED | OUTPATIENT
Start: 2018-10-13 | End: 2018-10-13 | Stop reason: HOSPADM

## 2018-10-13 RX ORDER — VERAPAMIL HYDROCHLORIDE 2.5 MG/ML
1-2.5 INJECTION, SOLUTION INTRAVENOUS
Status: DISCONTINUED | OUTPATIENT
Start: 2018-10-13 | End: 2018-10-13 | Stop reason: HOSPADM

## 2018-10-13 RX ORDER — ALUMINA, MAGNESIA, AND SIMETHICONE 2400; 2400; 240 MG/30ML; MG/30ML; MG/30ML
30 SUSPENSION ORAL EVERY 4 HOURS PRN
Status: DISCONTINUED | OUTPATIENT
Start: 2018-10-13 | End: 2018-10-13

## 2018-10-13 RX ORDER — NALOXONE HYDROCHLORIDE 0.4 MG/ML
0.4 INJECTION, SOLUTION INTRAMUSCULAR; INTRAVENOUS; SUBCUTANEOUS EVERY 5 MIN PRN
Status: DISCONTINUED | OUTPATIENT
Start: 2018-10-13 | End: 2018-10-13 | Stop reason: HOSPADM

## 2018-10-13 RX ORDER — POTASSIUM CHLORIDE 29.8 MG/ML
20 INJECTION INTRAVENOUS
Status: DISCONTINUED | OUTPATIENT
Start: 2018-10-13 | End: 2018-10-13 | Stop reason: HOSPADM

## 2018-10-13 RX ORDER — HYDRALAZINE HYDROCHLORIDE 10 MG/1
10 TABLET, FILM COATED ORAL 3 TIMES DAILY PRN
Status: DISCONTINUED | OUTPATIENT
Start: 2018-10-13 | End: 2018-10-14 | Stop reason: HOSPADM

## 2018-10-13 RX ADMIN — METOPROLOL TARTRATE 25 MG: 25 TABLET ORAL at 09:37

## 2018-10-13 RX ADMIN — ASPIRIN 325 MG ORAL TABLET 325 MG: 325 PILL ORAL at 00:38

## 2018-10-13 RX ADMIN — LISINOPRIL 10 MG: 10 TABLET ORAL at 09:37

## 2018-10-13 RX ADMIN — CLOPIDOGREL BISULFATE 600 MG: 300 TABLET, FILM COATED ORAL at 12:02

## 2018-10-13 RX ADMIN — LISINOPRIL 5 MG: 5 TABLET ORAL at 17:30

## 2018-10-13 RX ADMIN — LIDOCAINE HYDROCHLORIDE 30 ML: 20 SOLUTION ORAL; TOPICAL at 00:00

## 2018-10-13 RX ADMIN — ALUMINUM HYDROXIDE, MAGNESIUM HYDROXIDE, AND DIMETHICONE 30 ML: 400; 400; 40 SUSPENSION ORAL at 23:50

## 2018-10-13 RX ADMIN — METOPROLOL TARTRATE 25 MG: 25 TABLET ORAL at 21:01

## 2018-10-13 RX ADMIN — ACETAMINOPHEN 325 MG: 325 TABLET, FILM COATED ORAL at 05:17

## 2018-10-13 RX ADMIN — FENTANYL CITRATE 50 MCG: 50 INJECTION, SOLUTION INTRAMUSCULAR; INTRAVENOUS at 11:35

## 2018-10-13 RX ADMIN — ONDANSETRON 4 MG: 2 INJECTION INTRAMUSCULAR; INTRAVENOUS at 06:49

## 2018-10-13 RX ADMIN — POTASSIUM CHLORIDE 20 MEQ: 1.5 POWDER, FOR SOLUTION ORAL at 05:17

## 2018-10-13 RX ADMIN — MIDAZOLAM 1 MG: 1 INJECTION INTRAMUSCULAR; INTRAVENOUS at 11:35

## 2018-10-13 RX ADMIN — HEPARIN SODIUM 750 UNITS/HR: 10000 INJECTION, SOLUTION INTRAVENOUS at 01:31

## 2018-10-13 RX ADMIN — IOPAMIDOL 85 ML: 755 INJECTION, SOLUTION INTRAVASCULAR at 12:00

## 2018-10-13 RX ADMIN — NITROGLYCERIN 0.4 MG: 0.4 TABLET SUBLINGUAL at 00:38

## 2018-10-13 RX ADMIN — POTASSIUM CHLORIDE 40 MEQ: 1500 TABLET, EXTENDED RELEASE ORAL at 02:58

## 2018-10-13 RX ADMIN — ACETAMINOPHEN 650 MG: 325 TABLET, FILM COATED ORAL at 23:37

## 2018-10-13 RX ADMIN — NITROGLYCERIN 0.4 MG: 0.4 TABLET SUBLINGUAL at 01:05

## 2018-10-13 RX ADMIN — ATORVASTATIN CALCIUM 40 MG: 40 TABLET, FILM COATED ORAL at 21:01

## 2018-10-13 RX ADMIN — Medication 3800 UNITS: at 01:31

## 2018-10-13 RX ADMIN — KETOROLAC TROMETHAMINE 15 MG: 15 INJECTION, SOLUTION INTRAMUSCULAR; INTRAVENOUS at 00:00

## 2018-10-13 RX ADMIN — NITROGLYCERIN 0.4 MG: 0.4 TABLET SUBLINGUAL at 18:55

## 2018-10-13 RX ADMIN — NITROGLYCERIN 0.4 MG: 0.4 TABLET SUBLINGUAL at 00:47

## 2018-10-13 RX ADMIN — LIDOCAINE HYDROCHLORIDE 10 ML: 10 INJECTION, SOLUTION EPIDURAL; INFILTRATION; INTRACAUDAL; PERINEURAL at 11:42

## 2018-10-13 RX ADMIN — NITROGLYCERIN 0.4 MG: 0.4 TABLET SUBLINGUAL at 06:55

## 2018-10-13 ASSESSMENT — PAIN DESCRIPTION - DESCRIPTORS
DESCRIPTORS: HEADACHE;POUNDING
DESCRIPTORS: PRESSURE

## 2018-10-13 ASSESSMENT — ACTIVITIES OF DAILY LIVING (ADL)
ADLS_ACUITY_SCORE: 11
DRESS: 0-->INDEPENDENT
RETIRED_COMMUNICATION: 0-->UNDERSTANDS/COMMUNICATES WITHOUT DIFFICULTY
ADLS_ACUITY_SCORE: 11
AMBULATION: 0-->INDEPENDENT
FALL_HISTORY_WITHIN_LAST_SIX_MONTHS: NO
RETIRED_EATING: 0-->INDEPENDENT
DRESS: 0-->INDEPENDENT
BATHING: 0-->INDEPENDENT
RETIRED_COMMUNICATION: 0-->UNDERSTANDS/COMMUNICATES WITHOUT DIFFICULTY
RETIRED_EATING: 0-->INDEPENDENT
FALL_HISTORY_WITHIN_LAST_SIX_MONTHS: NO
COGNITION: 0 - NO COGNITION ISSUES REPORTED
TRANSFERRING: 0-->INDEPENDENT
AMBULATION: 0-->INDEPENDENT
SWALLOWING: 0-->SWALLOWS FOODS/LIQUIDS WITHOUT DIFFICULTY
TOILETING: 0-->INDEPENDENT
TOILETING: 0-->INDEPENDENT
BATHING: 0-->INDEPENDENT
SWALLOWING: 0-->SWALLOWS FOODS/LIQUIDS WITHOUT DIFFICULTY
COGNITION: 0 - NO COGNITION ISSUES REPORTED
TRANSFERRING: 0-->INDEPENDENT

## 2018-10-13 ASSESSMENT — ENCOUNTER SYMPTOMS
FREQUENCY: 1
NUMBNESS: 0
SHORTNESS OF BREATH: 0
CHILLS: 0
PALPITATIONS: 1
ABDOMINAL PAIN: 0
CHEST TIGHTNESS: 1
WEAKNESS: 0
DYSURIA: 0

## 2018-10-13 NOTE — PROGRESS NOTES
Cardiology Progress Note     Desire Moise MRN# [id number]       Date of Admission:  10/13/2018    Reason for consult: NSTEMI     HPI: Desire Moise is a 58 year old female with no cardiac risk factors transferred from [facility] with NSTEMI. She reports stuttering chest pain over the last week with an episode of approximately 5-6 hours last weekend which resolved after going to sleep. Yesterday she again had recurrence of pain lasting approximately 6 hours before she presented to [facility]. Pain resolved with nitroglycerin. She has one recurrence this morning which again resolved with NTG x 1. Troponin 0.28 to 1.56. ECG yesterday without ischemic changes (more recent ECGs not available in Epic now). She was transferred for urgent coronary angiography. She received aspirin and heparin.    Past Medical History:  Past Medical History:   Diagnosis Date     Diverticulosis        Past Surgical History:  Past Surgical History:   Procedure Laterality Date     GYN SURGERY       HYSTERECTOMY       INCISION OF HYMEN  1980     OPEN REDUCTION INTERNAL FIXATION WRIST Right 9/11/2017    Procedure: OPEN REDUCTION INTERNAL FIXATION WRIST;  Open reduction and internal fixation right intra-articular displaced distal radius fracture;  Surgeon: Santi Villarreal MD;  Location: [facility] OR     REMOVE HARDWARE UPPER EXTREMITY Right 2/2/2018    Procedure: REMOVE HARDWARE UPPER EXTREMITY;  Removal of deep implant, right distal radius;  Surgeon: Santi Villarreal MD;  Location: [facility] OR     [place] teeth surgery  1980       Allergies:   No Known Allergies    Medications:    Current Facility-Administered Medications on File Prior to Encounter:  [COMPLETED] aspirin tablet 325 mg   [COMPLETED] heparin Loading Dose bolus dose from infusion pump 3,800 Units   [COMPLETED] ketorolac (TORADOL) injection 15 mg   [COMPLETED] lidocaine (viscous) (XYLOCAINE) 2 % 15 mL, alum & mag hydroxide-simethicone (MYLANTA ES/MAALOX  ES) 15 mL GI  Cocktail   [COMPLETED] nitroGLYcerin (NITROSTAT) sublingual tablet 0.4 mg   [DISCONTINUED] acetaminophen (TYLENOL) Suppository 650 mg   [DISCONTINUED] acetaminophen (TYLENOL) tablet 650 mg   [DISCONTINUED] alum & mag hydroxide-simethicone (MYLANTA ES/MAALOX  ES) suspension 30 mL   [DISCONTINUED] aspirin chewable tablet 324 mg   [DISCONTINUED] aspirin tablet 325 mg   [DISCONTINUED] atorvastatin (LIPITOR) tablet 20 mg   [DISCONTINUED] Continuing statin from home medication list OR statin order already placed during this visit   [DISCONTINUED] heparin infusion 25,000 units in 0.45% NaCl 250 mL   [DISCONTINUED] heparin infusion 25,000 units in 0.45% NaCl 250 mL   [DISCONTINUED] HOLD: nitroGLYcerin IF   [DISCONTINUED] hydrALAZINE (APRESOLINE) injection 5 mg   [DISCONTINUED] labetalol (NORMODYNE/TRANDATE) injection 10 mg   [DISCONTINUED] lidocaine (LMX4) kit   [DISCONTINUED] lidocaine 1 % 1 mL   [DISCONTINUED] lisinopril (PRINIVIL/ZESTRIL) tablet 10 mg   [DISCONTINUED] LORazepam (ATIVAN) injection 0.5 mg   [DISCONTINUED] LORazepam (ATIVAN) tablet 0.5 mg   [DISCONTINUED] magnesium sulfate 2 g in NS intermittent infusion ([facility]arMED[facility] or [facility] Cmpd)   [DISCONTINUED] magnesium sulfate 4 g in 100 mL sterile water (premade)   [DISCONTINUED] medication instruction   [DISCONTINUED] metoclopramide (REGLAN) injection 10 mg   [DISCONTINUED] metoclopramide (REGLAN) tablet 10 mg   [DISCONTINUED] metoprolol tartrate (LOPRESSOR) tablet 25 mg   [DISCONTINUED] morphine (PF) injection 4 mg   [DISCONTINUED] naloxone (NARCAN) injection 0.1-0.4 mg   [DISCONTINUED] nitroGLYcerin (NITROSTAT) sublingual tablet 0.4 mg   [DISCONTINUED] ondansetron (ZOFRAN) injection 4 mg   [DISCONTINUED] ondansetron (ZOFRAN-ODT) ODT tab 4 mg   [DISCONTINUED] Patient is already receiving anticoagulation with heparin, enoxaparin (LOVENOX), warfarin (COUMADIN)  or other anticoagulant medication   [DISCONTINUED] potassium chloride (KLOR-CON) Packet 20-40 mEq    [DISCONTINUED] potassium chloride 10 mEq in 100 mL intermittent infusion with 10 mg lidocaine   [DISCONTINUED] potassium chloride 10 mEq in 100 mL sterile water intermittent infusion (premix)   [DISCONTINUED] potassium chloride 20 mEq in 50 mL intermittent infusion   [DISCONTINUED] potassium chloride SA (K-DUR/KLOR-CON M) CR tablet 20-40 mEq   [DISCONTINUED] prochlorperazine (COMPAZINE) injection 10 mg   [DISCONTINUED] prochlorperazine (COMPAZINE) Suppository 25 mg   [DISCONTINUED] prochlorperazine (COMPAZINE) tablet 10 mg   [DISCONTINUED] Reason ACE/ARB/ARNI order not selected   [DISCONTINUED] sodium chloride (PF) 0.9% PF flush 3 mL   [DISCONTINUED] sodium chloride (PF) 0.9% PF flush 3 mL     Current Outpatient Prescriptions on File Prior to Encounter:  acetaminophen (TYLENOL) 325 MG tablet Take 2 tablets (650 mg) by mouth every 4 hours as needed for other (mild pain)   [START ON 10/14/2018] aspirin 325 MG tablet Take 1 tablet (325 mg) by mouth daily   atorvastatin (LIPITOR) 20 MG tablet Take 1 tablet (20 mg) by mouth every evening   heparin infusion 25,000 units in 0.45% NaCl 250 mL Inject 750 Units/hr into the vein continuous   [START ON 10/14/2018] lisinopril (PRINIVIL/ZESTRIL) 10 MG tablet Take 1 tablet (10 mg) by mouth daily   metoprolol tartrate (LOPRESSOR) 25 MG tablet Take 1 tablet (25 mg) by mouth 2 times daily   nitroGLYcerin (NITROSTAT) 0.4 MG sublingual tablet For chest pain place 1 tablet under the tongue every 5 minutes for 3 doses. If symptoms persist 5 minutes after 1st dose call 911.       Social History:  Social History     Social History     Marital status:      Spouse name: John     Number of children: 3     Years of education: 15     Occupational History     Teacher [employer]     Social History Main Topics     Smoking status: Never Smoker     Smokeless tobacco: Never Used     Alcohol use No     Drug use: No     Sexual activity: Yes     Partners: Male     Birth control/ protection: Condom      Other Topics Concern     Exercise Yes     Seat Belt Yes     Self-Exams Yes     Social History Narrative       Family History:  Family History   Problem Relation Age of Onset     HEART DISEASE Father      cab     Lipids Father      Cancer Maternal Grandmother      cancer  Breast       ROS:  CONSTITUTIONAL:No report of fevers or chills  RESPIRATORY: No cough, wheezing, SOB, or hemoptysis  CARDIOVASCULAR: see HPI  MUSCULO-SKELETAL: No joint pain/swelling, no muslce pain  NEURO: No paresthesias, syncope, pre-syncope, light headness, dizziness or vertigo  ENDOCRINE: No temperature intolerance, no skin/hair changes  PSYCHIATRIC: No change in mood, feeling down/anxious, no change in sleep or appetite  GI: no melena or hematochezia, no change in bowel habits  : no hematuria or dysurea, no hesitancy, dribbling or incontinence  HEME: no easy bruising or bleeding, no history of anemia, no history of blood clots  SKIN: no rashes or sores, no unusual itching    Exam:  Temp:  [95.4  F (35.2  C)-98.3  F (36.8  C)] 98.3  F (36.8  C)  Pulse:  [73] 73  Heart Rate:  [61-76] 74  Resp:  [9-20] 16  BP: (146-190)/() 165/108  SpO2:  [90 %-99 %] 99 %  General: Alert, interactive, NAD  Eyes: sclera anicteric, EOMI  Cardiovascular: regular rate and rhythm, normal S1 and S2, no murmurs, gallops, or rubs, no JVD  Resp: clear to auscultation bilaterally, no rales, wheezes, or rhonchi  GI: Soft, nontender, nondistended. +BS.  No HSM or masses, no rebound or guarding.  Extremities: no edema, no cyanosis or clubbing, dorsalis pedis and posterior tibialis pulses 2+ bilaterally  Skin: Warm and dry, no jaundice or rash  Neuro: CN 2-12 intact, moves all extremities equally  Psych: Alert & oriented x 3    Data:  CMP  Recent Labs  Lab 10/13/18  0613 10/12/18  2341    141   POTASSIUM 4.6 3.3*   CHLORIDE 110* 107   CO2 26 29   ANIONGAP 4 5   * 100*   BUN 10 12   CR 0.76 0.78   GFRESTIMATED 78 76   GFRESTBLACK >90 >90   BENI 9.0 9.2    MAG 2.6*  --      CBC  Recent Labs  Lab 10/13/18  0250 10/12/18  2341   WBC 6.5 6.1   RBC 4.17 4.68   HGB 12.0 13.6   HCT 37.7 42.6   MCV 90 91   MCH 28.8 29.1   MCHC 31.8 31.9   RDW 13.2 13.2    260       Lab Results   Component Value Date    TROPI 1.566 () 10/13/2018    TROPI 0.903 () 10/13/2018    TROPI 0.283 () 10/12/2018    TROPONIN 0.01 04/04/2015         ECG today at midnight: NSR, nonspecific T-wave changes    Coronary angiogram   SUMMARY:   1. Non-ST elevation myocardial infarction.   2. Minimal nonobstructive coronary artery disease (luminal  irregularities).  3. High suspicion for spontaneous coronary artery dissection of a  small OM1 with EMMIE III flow.  4. Elevated left ventricular filling pressures with left ventricular  end-diastolic pressure 25 mmHg.  5. Mild hypokinesis of the basal anterolateral wall with mildly  reduced systolic function (LVEF 45-55%)      Assessment: 58 year-old female with no cardiac risk factors presenting with NSTEMI and possible SCAD    1.Non-ST-elevation myocardial infarction  2. Possible spontaneous coronary artery dissection: She is chest pain free. Angiography revealed minimal nonobstructive atherosclerotic plaque in the LAD but with suspicion for SCAD in a small OM1 which tapers rapidly. Apparent wall motion abnormality on LV-gram correlates with this finding. Will treat with aspiring and clopidogrel given NSTEMI and SCAD data on dual antiplatelet therapy. Beginning metoprolol. Statin use is controversial in SCAD but will start for now given atherosclerosis. Echo pending.  3. LV dysfunction with basal anterolateral wall hypokinesis and LVEF 45-55%: Formal echo pending. Correlates with possible SCAD in OM1. LVEDP elevated at 25 mmHg but she is quite hypertensive and without heart failure symptoms so will treat hypertension first and hold off on diuretic for now.  4. Elevated blood pressure without diagnosis of hypertension: BB and ACEI as  above    Recommendations:   Admit to inpatient service. Echocardiogram pending.  Aspirin 81 mg daily indefinitely.  Clopidogrel 75 mg daily for at least 12 months given NSTEMI (CURE trial and SCAD data).  Metoprolol tartrate 25 mg twice a day.  Lisinopril 5 mg daily.  Atorvastatin 40 mg daily.  Consider Imdur if she remains hypertensive despite lisinopril and metoprolol or if still having chest pain.    Patient seen and discussed with Dr. Kailash Barney MD  Interventional Cardiology Fellow

## 2018-10-13 NOTE — DISCHARGE SUMMARY
[facility]    Discharge Summary  Hospitalist    Date of Admission:  10/12/2018  Date of Discharge:  10/13/2018  Discharging Provider: Rajiv Bhagat MD, MD  Date of Service (when I saw the patient): 10/13/18    Discharge Diagnoses   NSTEMI    Hospital Course   58yoF with no pertinent personal history, and family history of CAD/CABG in her father who initially presented to the ED on 10/13 for evaluation of paroxysmal chest pain which has been occurring for about one week prior to arrival.  She has been having daily paroxysms of chest pressure with radiation to the left arm which have become more severe and frequent prompting visit to the ED.  Her EKG showed ST depressions in inferior leads and her troponin peaked at 1.5.  Her chest discomfort was relieved by sublingual nitroglycerin. She was incidentally noted to be hypertensive.  Patient was admitted for NSTEMI and started on a heparin infusion. She was evaluated by cardiology who recommended  transfer to [facility] for elective catheterization. She was started on ASA, metoprolol, lisinopril and atorvastatin.      Rajiv Bhagat MD    Significant Results and Procedures   EKG: Inferior ST depression    Code Status   Full Code       Primary Care Physician   Matilde Christopher        Discharge Disposition   Transferred to [facility]  Condition at discharge: Stable    Consultations This Hospital Stay   PHARMACY TO DOSE HEPARIN  CARDIAC REHAB IP CONSULT  CARDIOLOGY IP CONSULT  PHARMACY TO DOSE HEPARIN    Time Spent on this Encounter   I, Rajiv Bhagat, personally saw the patient today and spent greater than 30 minutes discharging this patient.    Discharge Orders     Reason for your hospital stay   Admitted to [facility] for chest pain and diagnosed with NSTEMI. Transfer to [facility] as per cardiology for angiography and definitive care.     Activity - Up with nursing assistance     Full Code     NPO for Medical/Clinical Reasons        Discharge Medications   Current Discharge Medication List      START taking these medications    Details   aspirin 325 MG tablet Take 1 tablet (325 mg) by mouth daily  Qty: 180 tablet, Refills: 0    Associated Diagnoses: NSTEMI (non-ST elevated myocardial infarction) (H)      atorvastatin (LIPITOR) 20 MG tablet Take 1 tablet (20 mg) by mouth every evening  Qty: 30 tablet, Refills: 0    Associated Diagnoses: NSTEMI (non-ST elevated myocardial infarction) (H)      heparin infusion 25,000 units in 0.45% NaCl 250 mL Inject 750 Units/hr into the vein continuous  Qty: 250 mL, Refills: 0    Associated Diagnoses: NSTEMI (non-ST elevated myocardial infarction) (H)      lisinopril (PRINIVIL/ZESTRIL) 10 MG tablet Take 1 tablet (10 mg) by mouth daily  Qty: 30 tablet, Refills: 0    Associated Diagnoses: NSTEMI (non-ST elevated myocardial infarction) (H)      metoprolol tartrate (LOPRESSOR) 25 MG tablet Take 1 tablet (25 mg) by mouth 2 times daily  Qty: 60 tablet, Refills: 0    Associated Diagnoses: NSTEMI (non-ST elevated myocardial infarction) (H)      nitroGLYcerin (NITROSTAT) 0.4 MG sublingual tablet For chest pain place 1 tablet under the tongue every 5 minutes for 3 doses. If symptoms persist 5 minutes after 1st dose call 911.  Qty: 25 tablet, Refills: 0    Associated Diagnoses: NSTEMI (non-ST elevated myocardial infarction) (H)         CONTINUE these medications which have NOT CHANGED    Details   acetaminophen (TYLENOL) 325 MG tablet Take 2 tablets (650 mg) by mouth every 4 hours as needed for other (mild pain)  Qty: 30 tablet, Refills: 0    Associated Diagnoses: Closed Colles' fracture of right radius, sequela           Allergies   No Known Allergies  Data   Most Recent 3 CBC's:  Recent Labs   Lab Test  10/13/18   0250  10/12/18   2341  03/14/16   2200   WBC  6.5  6.1  6.8   HGB  12.0  13.6  11.5*   MCV  90  91  88   PLT  229  260  240      Most Recent 3 BMP's:  Recent Labs   Lab Test  10/13/18   0613  10/12/18    2341  03/14/16   2200   NA  140  141  142   POTASSIUM  4.6  3.3*  3.3*   CHLORIDE  110*  107  105   CO2  26  29  28   BUN  10  12  10   CR  0.76  0.78  0.72   ANIONGAP  4  5  9   BENI  9.0  9.2  9.1   GLC  120*  100*  90     Most Recent 2 LFT's:  Recent Labs   Lab Test  03/14/16   2200  08/07/15   0900   AST  13  17   ALT  16  18   ALKPHOS  93  83   BILITOTAL  0.4  0.7     Most Recent INR's and Anticoagulation Dosing History:  Anticoagulation Dose History     Recent Dosing and Labs Latest Ref Rng & Units 4/4/2015    INR 0.86 - 1.14 1.01        Most Recent 3 Troponin's:  Recent Labs   Lab Test  10/13/18   0613  10/13/18   0250  10/12/18   2341   04/04/15   0012   TROPI  1.566*  0.903*  0.283*   < >   --    TROPONIN   --    --    --    --   0.01    < > = values in this interval not displayed.     Most Recent Cholesterol Panel:No lab results found.  Most Recent 6 Bacteria Isolates From Any Culture (See EPIC Reports for Culture Details):  Recent Labs   Lab Test  04/08/15   2128  04/08/15   1813  04/08/15   1524   CULT  Duplicate request Charge credited  No Beta Streptococcus isolated  >100,000 colonies/mL Beta hemolytic Streptococcus group B Beta Hemolytic   Streptococcus groups A and B are susceptible to ampicillin, penicillin,   vancomycin, and the cephalosporins.  Susceptibility testing is not routinely   done on these organisms isolated from urine.  *     Most Recent TSH, T4 and A1c Labs:  Recent Labs   Lab Test  10/13/18   0250   TSH  1.42

## 2018-10-13 NOTE — IP AVS SNAPSHOT
MRN:[id number]                      After Visit Summary   10/13/2018    Desire Moise    MRN: [id number]           Thank you!     Thank you for choosing [facility] for your care. Our goal is always to provide you with excellent care. Hearing back from our patients is one way we can continue to improve our services. Please take a few minutes to complete the written survey that you may receive in the mail after you visit with us. Thank you!        Patient Information     Date Of Birth          1960        Designated Caregiver       Most Recent Value    Caregiver    Will someone help with your care after discharge? 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  For non-urgent questions about your medical care, please call your primary care provider or clinic, [phone]          Attending Provider     Provider Specialty    Nakul Linder MD Cardiology    [facility] Internal Medicine       Primary Care Provider Office Phone # Fax #    Matilde Ashwin [phone]      After Care Instructions     Activity       Your activity upon discharge: As tolerated            Diet       Follow this diet upon discharge: Orders Placed This Encounter      Combination Diet Regular Diet Adult;                  Follow-up Appointments     Follow-up and recommended labs and tests        Follow up with PCP in one week. Please call Monday to schedule this appointment  Matilde Christopher   [phone]   [facility] [address]  [place] [address]       You will need to follow up with Cardiology for an appointment in 1 week.  [facility] you this week to help you schedule an appointment with a Nurse Practitioner/Physicians Assistant.   [address]  [place] [address]  Phone: [phone]    If you are not contacted by them by Tuesday 10/16 please call the number above                  Additional Services     CARDIAC REHAB REFERRAL       Patient may choose their preference of the site for Cardiac Rehab.            Follow-Up with Cardiologist                 Further instructions from your care team       Going Home after ANGIOGRAM     Name: Desire Moise  Medical Record Number:  [id number]  Today's Date: October 14, 2018        For 24 hours:         Have an adult stay with you for 24 hours.         Relax and take it easy.         Drink plenty of fluids.         You may eat your normal diet, unless your doctor tells you otherwise.         Do NOT make any important or legal decisions.         Do NOT drive or operate machines at home or at work.         Do NOT drink alcohol.      Do NOT smoke.     Medicines:         If you have begun Plavix (clopidogrel), Effient (prasugrel), or Brilinta (ticagrelor), do not stop taking it until you talk to your heart doctor (cardiologist).         If you are on metformin (Glucophage), do not restart it until you have blood tests (within 2 to 3 days after discharge). When your doctor tells you it is safe, you may restart the metformin.         If you have stopped any other medicines, check with your nurse or provider about when to restart them.    Care of groin site:         Remove the Band-Aid after 24 hours. If there is minor oozing, apply another Band-aid and remove it after 12 hours.          Do NOT take a bath, or use a hot tub or pool for at least 3 days. You may shower.          It is normal to have a small bruise or lump at the site.         Do not  scrub the site.         Do not use lotion or powder near the puncture site for 3 days.         For the first 2 days: Do not stoop or squat. When you cough, sneeze or move your bowels, hold your hand over the puncture site and press gently.         Do not lift more than 10 pounds for at least 3 to 5 days.         For 2 days, do NOT have sex or do any heavy exercise.     If you start bleeding from the site in your groin:  Lie down flat and press firmly on the site.  Call your physician immediately, or, come to the emergency room.      Call 911 right away if you have bleeding that is heavy or does not stop.     Call your doctor if:         You have a large or growing hard lump around the site.         The site is red, swollen, hot or tender.         Blood or fluid is draining from the site.         You have chills or a fever greater than 101 F (38 C).         Your leg or arm turns bluish, feels numb or cool.         You have hives, a rash or unusual itching.     Cardiac Rehabilitation: You should receive a phone call from the Cardiac Rehabilitation Department within the next week.  If you do not receive the call, please contact [facility] at [phone].    ADDITIONAL INSTRUCTIONS:     [facility] [place]:   [phone] (7 days a week)      Cardiology Fellow on call (24 hours per day) at [facility]:   [phone] (ask for Cardiology Fellow on call)      Number where we can reach you:  ____________    Pending Results     Date and Time Order Name Status Description    10/13/2018 1851 EKG 12-lead, tracing only - STAT Preliminary     10/13/2018 1224 EKG 12-lead, tracing only  Preliminary     10/13/2018 0036 EKG 12-lead, tracing only Preliminary     10/12/2018 2223 EKG 12 lead Preliminary             Statement of Approval     Ordered          10/14/18 3119  I have reviewed and agree with all the recommendations and orders detailed in this document.  EFFECTIVE  "NOW     Approved and electronically signed by:  Jose Cruz Fragoso MD             Admission Information     Date & Time Provider Department Dept. Phone    10/13/2018 Jose Cruz Fragoso MD [facility] Coronary Care Unit [phone]      Your Vitals Were     Blood Pressure Temperature Respirations Weight Last Period Pulse Oximetry    125/78 (BP Location: Left arm) 99  F (37.2  C) (Oral) 16 68.5 kg (151 lb 0.2 oz) 06/15/2003 97%    BMI (Body Mass Index)                   24.37 kg/m2           MyChart Information     [employer] lets you send messages to your doctor, view your test results, renew your prescriptions, schedule appointments and more. To sign up, go to [email or web address] . Click on \"Log in\" on the left side of the screen, which will take you to the Welcome page. Then click on \"Sign up Now\" on the right side of the page.     You will be asked to enter the access code listed below, as well as some personal information. Please follow the directions to create your username and password.     Your access code is: [email or web address]  Expires: 2019 10:19 AM     Your access code will  in 90 days. If you need help or a new code, please call your [facility] clinic or [phone].        Care EveryWhere ID     This is your Care EveryWhere ID. This could be used by other organizations to access your [facility] medical records  [id number]        Equal Access to Services     [facility]: Hadii jamaal alegre hadasho Sorohiniali, waaxda luqadaha, qaybta kaalmada sara, navin vance . So [facility] [place] [facility] [phone].    ATENCIÓN: Si habla español, tiene a manzo disposición servicios gratuitos de asistencia lingüística. Llame al [phone].    We comply with applicable federal civil rights laws and [place] laws. We do not discriminate on the basis of race, color, national origin, age, disability, sex, sexual orientation, or gender identity.               Review of your medicines      START " taking        Dose / Directions    aspirin 81 MG EC tablet        Dose:  81 mg   Start taking on:  10/15/2018   Take 1 tablet (81 mg) by mouth daily   Quantity:  30 tablet   Refills:  1       atorvastatin 40 MG tablet   Commonly known as:  LIPITOR        Dose:  40 mg   Take 1 tablet (40 mg) by mouth daily   Quantity:  30 tablet   Refills:  3       clopidogrel 75 MG tablet   Commonly known as:  PLAVIX        Dose:  75 mg   Start taking on:  10/15/2018   Take 1 tablet (75 mg) by mouth daily   Quantity:  30 tablet   Refills:  3       lisinopril 10 MG tablet   Commonly known as:  PRINIVIL/ZESTRIL        Dose:  10 mg   Start taking on:  10/15/2018   Take 1 tablet (10 mg) by mouth daily   Quantity:  30 tablet   Refills:  3       metoprolol tartrate 25 MG tablet   Commonly known as:  LOPRESSOR        Dose:  25 mg   Take 1 tablet (25 mg) by mouth 2 times daily   Quantity:  60 tablet   Refills:  3       nitroGLYcerin 0.4 MG sublingual tablet   Commonly known as:  NITROSTAT        For chest pain place 1 tablet under the tongue every 5 minutes for 3 doses. If symptoms persist 5 minutes after 1st dose call 911.   Quantity:  25 tablet   Refills:  0         CONTINUE these medicines which have NOT CHANGED        Dose / Directions    acetaminophen 325 MG tablet   Commonly known as:  TYLENOL   Used for:  Closed Colles' fracture of right radius, sequela        Dose:  650 mg   Take 2 tablets (650 mg) by mouth every 4 hours as needed for other (mild pain)   Quantity:  30 tablet   Refills:  0            Where to get your medicines      These medications were sent to [facility] [place]  [place] - [address]  [address] [facility] [address], [place] [address]     Phone:  [phone]     aspirin 81 MG EC tablet    atorvastatin 40 MG tablet    clopidogrel 75 MG tablet    lisinopril 10 MG tablet    metoprolol tartrate 25 MG tablet         These medications were sent to [facility] [address] [place] [address] [place], [place] - [address] [facility]  [address] [facility] [address] [facility]  [address] [place] [address] [address] [place] [id number]    Hours:  24-hours Phone:  [phone]     nitroGLYcerin 0.4 MG sublingual tablet                Protect others around you: Learn how to safely use, store and throw away your medicines at www.disposemymeds.org.             Medication List: This is a list of all your medications and when to take them. Check marks below indicate your daily home schedule. Keep this list as a reference.      Medications           Morning Afternoon Evening Bedtime As Needed    acetaminophen 325 MG tablet   Commonly known as:  TYLENOL   Take 2 tablets (650 mg) by mouth every 4 hours as needed for other (mild pain)   Last time this was given:  650 mg on 10/14/2018  1:27 PM                                   aspirin 81 MG EC tablet   Take 1 tablet (81 mg) by mouth daily   Start taking on:  10/15/2018   Last time this was given:  81 mg on 10/14/2018  9:42 AM   Next Dose Due:  10-15-18 AM                                   atorvastatin 40 MG tablet   Commonly known as:  LIPITOR   Take 1 tablet (40 mg) by mouth daily   Last time this was given:  40 mg on 10/13/2018  9:01 PM   Next Dose Due:  10-14-18 PM                                   clopidogrel 75 MG tablet   Commonly known as:  PLAVIX   Take 1 tablet (75 mg) by mouth daily   Start taking on:  10/15/2018   Last time this was given:  75 mg on 10/14/2018  9:42 AM   Next Dose Due:  10-15-18 AM                                   lisinopril 10 MG tablet   Commonly known as:  PRINIVIL/ZESTRIL   Take 1 tablet (10 mg) by mouth daily   Start taking on:  10/15/2018   Last time this was given:  10 mg on 10/14/2018  9:42 AM   Next Dose Due:  10-15-18 AM                                   metoprolol tartrate 25 MG tablet   Commonly known as:  LOPRESSOR   Take 1 tablet (25 mg) by mouth 2 times daily   Last time this was given:  25 mg on 10/14/2018  9:42 AM   Next Dose Due:  10-14-18 PM                                       nitroGLYcerin 0.4 MG sublingual tablet   Commonly known as:  NITROSTAT   For chest pain place 1 tablet under the tongue every 5 minutes for 3 doses. If symptoms persist 5 minutes after 1st dose call 911.   Last time this was given:  0.4 mg on 10/13/2018  6:55 PM

## 2018-10-13 NOTE — ED PROVIDER NOTES
History     Chief Complaint:  Chest pain     HPI   Desire Moise is a 58 year old female who presents to the emergency department today for evaluation of chest pain. The patient reports she has had intermittent chest pain for the past week. Today she noticed the pressure in her chest tightened and radiated to her left arm. She describes it as a tight pressure. Due to these worsening symptoms she presented to the emergency department today. She noticed for the past week she has had a rapid heart rate, and that later in the day the chest tightness follows. She says that exertion and deep breathes don't worsen her pain. She says it feels as if her heart is skipping beats intermittently. She does note that she has had a rapid heart beat for years, but she doesn't usually notice it like she has this past week. She has a blood pressure cuff at home and says that when her blood pressure rises the tightness in her chest worsens as well. Additionally, she does a family history of heart disease. She endorses night sweats. She denies urinary symptoms, coughing, abdominal pain  and fever.        Allergies:  No Known Drug Allergies        Medications:    HYDROcodone-acetaminophen (NORCO) 5-325 MG per tablet  senna-docusate (SENOKOT-S;PERICOLACE) 8.6-50 MG per tablet      Past Medical History:    Diverticulosis      Past Surgical History:    GYN surgery  Hysterectomy  Incision of hymen  Open reduction internal fixation  Remove hardware upper extremity      Family History:    Heart disease      Social History:  The patient was accompanied to the ED by .  Smoking Status: Never Smoker  Smokeless Tobacco: Never Used  Alcohol Use: No   Marital Status:   [2]       Review of Systems   Constitutional: Negative for chills and fever.   Respiratory: Positive for chest tightness. Negative for cough and shortness of breath.    Cardiovascular: Positive for chest pain and palpitations. Negative for leg swelling.  "  Gastrointestinal: Negative for abdominal pain.   Genitourinary: Positive for frequency. Negative for dysuria and urgency.   Musculoskeletal: Positive for myalgias (left arm).   Neurological: Negative for weakness and numbness.   All other systems reviewed and are negative.      Physical Exam   First Vitals:  BP: (!) 187/117  Pulse: 73  Heart Rate: 69  Temp: 98  F (36.7  C)  Resp: 18  Height: 167.6 cm (5' 6\")  Weight: 68.9 kg (152 lb)  SpO2: 99 %      Physical Exam  General: Alert, no acute distress; nontoxic appearing; no acute distress  Neuro:  PERRL.  EOMI.  Gait stable, no focal deficits  HEENT:  Moist mucous membranes.  Conjunctiva normal.   CV:  RRR, no m/r/g, skin warm and well perfused  Pulm:  CTAB, no wheezes/ronchi/rales.  No acute distress, breathing comfortably  GI:  Soft, nontender, nondistended.  No rebound or guarding.  Normal bowel sounds  MSK:  Moving all extremities.  No focal areas of edema, erythema, or tenderness  Skin:  WWP, no rashes, no lower extremity edema, skin color normal, no diaphoresis  Psych:  Well-appearing, normal affect, regular speech      Emergency Department Course     1) ECG:  Indication: chest pain   Completed at 2228.  Read at 2335  Normal sinus rhythm. Nonspecific ST abnormality. Abnormal ECG.  Rate 73 bpm. SC interval 128. QRS duration 82. QT/QTc 398/438. P-R-T axes 68 83 64.     2) ECG:  Indication: chest pain   Completed at 0043.  Read at 0043.   Normal Sinus rhythm. Nonspecific ST abnormality. Abnormal ECG.  Rate 74 bpm. SC interval 128. QRS duration 82. QT/QTc 402/446. P-R-T axes 51 64 90.     Imaging:  Radiology findings were communicated with the patient who voiced understanding of the findings.    Chest X-Ray. 2 Views:   IMPRESSION: Negative chest. Lungs clear.  reading per radiology.        Laboratory:  Laboratory findings were communicated with the patient who voiced understanding of the findings.    CBC: WBC 6.1, HGB 13.6,   BMP: Potassium 3.3 (L), Glucose " 100 (H) o/w WNL (Creatinine 0.78)   Troponin (Collected 2341): 0.283 (HH)     UA: Straw and Clear. Urine Blood Small, Leukocyte Esterase Urine Small, RBC/HPF 3 (H), Mucous Urine Present o/w WNL      Interventions:  0000 GI Cocktail 30mLs PO   0000 Toradol 15 mg IV  0038 Aspirin 325 mg PO  0100 Heparin infusion 0.45% NaCl 250 mL  0105 Nitrostat 0.4 mg Sublingual       Emergency Department Course:  Nursing notes and vitals reviewed.  2306 I performed an exam of the patient as documented above.   EKG obtained in the ED, see results above.    IV was inserted and blood was drawn for laboratory testing, results above.   The patient was sent for a Chest XR while in the emergency department, results above.    0027 Patient rechecked and updated.    0117 I spoke with Dr. Ojeda of the Hospitalist service regarding patient's presentation, findings, and plan of care.   I discussed the treatment plan with the patient. They expressed understanding of this plan and consented to admission. I discussed the patient with Dr. Ojeda, who will admit the patient to a monitored bed for further evaluation and treatment.   I personally reviewed the laboratory and imaging results with the Patient and answered all related questions prior to admission.    Impression & Plan      Medical Decision Making:  Desire Moise is a 58 year old female with no significant past medical history presents to the emergency department for evaluation of intermittent chest pain over the last week with worsening chest pain tonight's radiating to the left shoulder.  Noted to be hypertensive with rest of her vital signs are stable.  She otherwise denies any shortness of breath.  Exam was largely unremarkable.  EKG shows slight ST depression in the lateral leads but no ST elevation concerning for STEMI.  Lab workup remarkable for positive troponin.  The rest of her laboratory testing and chest x-ray were normal.  No concern at this point for PE or dissection.   Pain was relieved with 3 sublingual nitro.  IV morphine ordered PRN chest pain.  Repeat EKG obtained showed no dynamic changes.  Patient was given 325 aspirin and started on heparin bolus and drip.  Given the above findings, will admit to medicine under telemetry for further monitoring and cares of NSTEMI and hypertensive emergency.  Patient remained chest pain-free while in the emergency department and she was admitted in stable condition.      Diagnosis:    ICD-10-CM    1. NSTEMI (non-ST elevated myocardial infarction) (H) I21.4 UA with Microscopic   2. Hypertensive emergency I16.1        Disposition:  Admitted under the supervision of Dr. Rusty Barajas Disclosure:  I, Ju Cohen, am serving as a scribe at 11:03 PM on 10/12/2018 to document services personally performed by Sy Oviedo MD based on my observations and the provider's statements to me.    Ju Cohen  10/12/2018   [facility] EMERGENCY DEPARTMENT       Sy Oviedo MD  10/13/18 0150

## 2018-10-13 NOTE — PLAN OF CARE
Problem: Patient Care Overview  Goal: Plan of Care/Patient Progress Review  Outcome: No Change  VSS.  Patient went to ED with chest pain and was admitting dx: NSTEMI, hypertensive emergency.  Tele SR. Lungs clear.  Potassium was low at 3.6 and patient was given 40 mEq then 2 hours later 20 mEq per protocol will be rechecked in am.  Lipid panel in am. Troponin was 0.283 recheck was increased to 0.903.  Patient had pain of 6 out of 10 stating she had a headache and was given tylenol.  Patient on heparin drip and needs Hep10A recheck in am. Patient stated she was nauseated and no intervention was wanted.  Patient slept on and off through out the night.

## 2018-10-13 NOTE — PHARMACY-ADMISSION MEDICATION HISTORY
Admission medication history interview status for this patient is complete. See [facility] admission navigator for allergy information, prior to admission medications and immunization status.     Medication history interview source(s):Patient  Medication history resources (including written lists, pill bottles, clinic record):None  Primary pharmacy: [facility] - [place], [place] ([address])    Changes made to PTA medication list:  Added: none  Deleted: hydrocodone/APAP (discontinued), senna-docusate (discontinued)  Changed: none    Actions taken by pharmacist (provider contacted, etc):None     Additional medication history information:None    Medication reconciliation/reorder completed by provider prior to medication history? Yes    Do you take OTC medications (eg tylenol, ibuprofen, fish oil, eye/ear drops, etc)? Y     Prior to Admission medications    Medication Sig Last Dose Taking? Auth Provider   acetaminophen (TYLENOL) 325 MG tablet Take 2 tablets (650 mg) by mouth every 4 hours as needed for other (mild pain) 10/13/2018 at 0530 Yes Santi Villarreal MD

## 2018-10-13 NOTE — H&P
Hospitalist Admission Note([facility])    Name: Desire Moise    MRN: [id number]          YOB: 1960    Age: 58 year old  Date of admission: 10/12/2018  Primary care provider: Matilde Christopher              Assessment:       Brief summary of admission assessment:Desire Moise is a 58 year old  female without a significant past medical history who presents with chest pain that started Saturday and got worse today.She has associated palpitation and sob.    ED evaluation revealed hypertension with BP of 187/117 ,trop elevated at 0.283 and nonspecific ST changes.Her risk factor for CAD is family history and occasional HTN     Patient's presentation is consistent with NSTEMI and will be  be admitted for further inpatient management to care of Hospitalist service.      Admission diagnoses:      #1.NSTEMI     #2.Hypertensive emergency     #3.Hyokalemia         Comorbid medical conditions:   --High BP occasional and untreated        Plan/MDM:       > Admission Status: Will admit patient to hospitalist service as inpatient as patient likely need over two mid night stays in the hospital.     >Care plan:      Tele admission     Heparin , ASA , bb     ECHO     Cardiology consult     Add TSH to drawn labs    Replace K     Ativan prn     >Supportive care:Oxygen continued  Pain management: anxiolytics, oral narcotics and IV narcotics  Respiratory therapy    >Diet:Diet advanced    >Activity:Advance activity as tolerated    >Education/Counseling :Discussed treatment plan with the patient    >Consults:Inpatient consult with cardiology    >VTE prophylactic measures:prophylaxis against venous thromboembolism    >Therapies:none       >Additional orders:    --Care plan discussed with the patient/family and agreed to care plan   --Patient will be transferred to care of hospitalist attending for further evaluation and management as appropriate   --Old medical orders reviewed   --imaging result independently  reviewed by me     (See orders placed for this visit by me )     - Home medication reviewed and will be continued as appropriate once pharmacy reconciliation is completed         Code Status/Disposition:     >Code Status:Full Code      >Disposition:anticipate discharge to home and Anticipate discharge in 3 days        Disclaimer: This note consists of symbols derived from keyboarding, dictation and/or voice recognition software. As a result, there may be errors in the script that have gone undetected. Please consider this when interpreting information found in this chart.             Chief Complaint:     Chest pain      History is obtained from the patient          History of Present Illness:      This patient is a 58 year old  female with a significant past medical history of no significant PMH who presents with the following condition requiring a hospital admission:        Chest Pain  Desire Moise complains of chest pain. Onset was 1 week ago. Symptoms have resolved but recurred today  since that time. The patient's pain is intermittent. The patient describes the pain as heaviness, pressure, tightness and radiates to the left arm. Patient rates pain as a 5/10 in intensity. Associated symptoms are: dyspnea and palpitations. Aggravating factors are: none. Alleviating factors are: none. Patient's cardiac risk factors are: family history and hypertension. Patient's risk factors for DVT/PE: none known. Previous cardiac testing: none.              Past Medical History:     Past Medical History:   Diagnosis Date     Diverticulosis             Past Surgical History:     Past Surgical History:   Procedure Laterality Date     GYN SURGERY       HYSTERECTOMY       INCISION OF HYMEN  1980     OPEN REDUCTION INTERNAL FIXATION WRIST Right 9/11/2017    Procedure: OPEN REDUCTION INTERNAL FIXATION WRIST;  Open reduction and internal fixation right intra-articular displaced distal radius fracture;  Surgeon: Santi Villarreal,  MD;  Location: [facility] OR     REMOVE HARDWARE UPPER EXTREMITY Right 2/2/2018    Procedure: REMOVE HARDWARE UPPER EXTREMITY;  Removal of deep implant, right distal radius;  Surgeon: Santi Villarreal MD;  Location: [facility] OR     [place] teeth surgery  1980             Social History:     Social History   Substance Use Topics     Smoking status: Never Smoker     Smokeless tobacco: Never Used     Alcohol use No             Family History:     Family History   Problem Relation Age of Onset     HEART DISEASE Father      cab     Lipids Father      Cancer Maternal Grandmother      cancer  Breast            Allergies:   No Known Allergies          Medications:        Prior to Admission medications    Medication Sig Last Dose Taking? Auth Provider   acetaminophen (TYLENOL) 325 MG tablet Take 2 tablets (650 mg) by mouth every 4 hours as needed for other (mild pain)   Santi Villarreal MD   HYDROcodone-acetaminophen (NORCO) 5-325 MG per tablet Take 1-2 tablets by mouth every 4 hours as needed for other (Moderate to Severe Pain)   Santi Villarreal MD   senna-docusate (SENOKOT-S;PERICOLACE) 8.6-50 MG per tablet Take 1-2 tablets by mouth 2 times daily Take while on oral narcotics to prevent or treat constipation.   Santi Villarreal MD          Review of Systems:     A Comprehensive greater than 10 system review of systems was carried out.  Pertinent positives and negatives are noted above in HPI.  Otherwise negative for contributory information.           Physical Exam:     Vital signs were reviewed    Temp:  [98  F (36.7  C)] 98  F (36.7  C)  Pulse:  [73] 73  Heart Rate:  [61-76] 75  Resp:  [9-20] 9  BP: (147-187)/() 147/103  SpO2:  [90 %-99 %] 90 %        GEN: awake, alert, cooperative, no apparent distress, oriented x 3    NECK:Supple ,no mass or thyromegaly     HEENT:  Normocephalic/atraumatic, no scleral icterus, no nasal discharge, mouth moist.    CV:  Regular rate and rhythm, no murmur or JVD.  S1 + S2 noted, no S3 or  S4.    LUNGS:  Clear to auscultation bilaterally without rales/rhonchi/wheezing/retractions.  Symmetric chest rise on inhalation noted.    ABD:  Active bowel sounds, soft, non-tender/non-distended.  No rebound/guarding/rigidity.    EXT:  No edema.  No cyanosis.  No joint synovitis noted.Lower extremity pulses are normal bilaterally and     LGS: No cervical or axillary lymphadenopathy     SKIN:  Dry to touch, warm ,no exanthems noted in the visualized areas.    Neurologic:Grossly intact,non focal . No acute focal neurologic deficit     Psychaitric exam: Mood and affect normal              Data:       All laboratory and imaging data in the past 24 hours reviewed     Results for orders placed or performed during the hospital encounter of 10/12/18   XR Chest 2 Views    Narrative    CHEST TWO VIEWS  10/13/2018 12:15 AM     HISTORY:  Chest pain.    COMPARISON: 1/10/2018.      Impression    IMPRESSION: Negative chest. Lungs clear.    AMANDA SULLIVAN MD   CBC with platelets differential   Result Value Ref Range    WBC 6.1 4.0 - 11.0 10e9/L    RBC Count 4.68 3.8 - 5.2 10e12/L    Hemoglobin 13.6 11.7 - 15.7 g/dL    Hematocrit 42.6 35.0 - 47.0 %    MCV 91 78 - 100 fl    MCH 29.1 26.5 - 33.0 pg    MCHC 31.9 31.5 - 36.5 g/dL    RDW 13.2 10.0 - 15.0 %    Platelet Count 260 150 - 450 10e9/L    Diff Method Automated Method     % Neutrophils 60.7 %    % Lymphocytes 26.8 %    % Monocytes 8.2 %    % Eosinophils 2.8 %    % Basophils 1.3 %    % Immature Granulocytes 0.2 %    Nucleated RBCs 0 0 /100    Absolute Neutrophil 3.7 1.6 - 8.3 10e9/L    Absolute Lymphocytes 1.6 0.8 - 5.3 10e9/L    Absolute Monocytes 0.5 0.0 - 1.3 10e9/L    Absolute Eosinophils 0.2 0.0 - 0.7 10e9/L    Absolute Basophils 0.1 0.0 - 0.2 10e9/L    Abs Immature Granulocytes 0.0 0 - 0.4 10e9/L    Absolute Nucleated RBC 0.0    Basic metabolic panel   Result Value Ref Range    Sodium 141 133 - 144 mmol/L    Potassium 3.3 (L) 3.4 - 5.3 mmol/L    Chloride 107 94 - 109  mmol/L    Carbon Dioxide 29 20 - 32 mmol/L    Anion Gap 5 3 - 14 mmol/L    Glucose 100 (H) 70 - 99 mg/dL    Urea Nitrogen 12 7 - 30 mg/dL    Creatinine 0.78 0.52 - 1.04 mg/dL    GFR Estimate 76 >60 mL/min/1.7m2    GFR Estimate If Black >90 >60 mL/min/1.7m2    Calcium 9.2 8.5 - 10.1 mg/dL   Troponin I   Result Value Ref Range    Troponin I ES 0.283 (HH) 0.000 - 0.045 ug/L   UA with Microscopic   Result Value Ref Range    Color Urine Straw     Appearance Urine Clear     Glucose Urine Negative NEG^Negative mg/dL    Bilirubin Urine Negative NEG^Negative    Ketones Urine Negative NEG^Negative mg/dL    Specific Gravity Urine 1.009 1.003 - 1.035    Blood Urine Small (A) NEG^Negative    pH Urine 7.0 5.0 - 7.0 pH    Protein Albumin Urine Negative NEG^Negative mg/dL    Urobilinogen mg/dL 0.0 0.0 - 2.0 mg/dL    Nitrite Urine Negative NEG^Negative    Leukocyte Esterase Urine Small (A) NEG^Negative    Source Unspecified Urine     WBC Urine 1 0 - 5 /HPF    RBC Urine 3 (H) 0 - 2 /HPF    Squamous Epithelial /HPF Urine <1 0 - 1 /HPF    Mucous Urine Present (A) NEG^Negative /LPF   EKG 12 lead   Result Value Ref Range    Interpretation ECG Click View Image link to view waveform and result    EKG 12-lead, tracing only   Result Value Ref Range    Interpretation ECG Click View Image link to view waveform and result             Recent Results (from the past 48 hour(s))   XR Chest 2 Views    Narrative    CHEST TWO VIEWS  10/13/2018 12:15 AM     HISTORY:  Chest pain.    COMPARISON: 1/10/2018.      Impression    IMPRESSION: Negative chest. Lungs clear.    AMANDA SULLIVAN MD       EKG results: Normal sinus rhythm. Nonspecific ST abnormality     All imaging studies reviewed by me.         Patient`s old medical records reviewed and case discussed with the ED physician.    ED course-Reviewed

## 2018-10-13 NOTE — CONSULTS
Full cardiology consultation dictated.  Elevated troponin levels with normal EKG.  Maybe ischemic, SCAD or myocarditis.  Given ongoing, intermittent angina, will need coronary angiography to assess cause.  All risks and benefits defined and accepted including possible PTCA/stenting or CABG.  I have discussed the case with [facility].  Will treat with beta blocker, ACE Inhibitor, statin, ASA and IV heparin.  Juan Carlos Zuleta MD, FACC  October 13, 2018 8:56 AM  [id number]

## 2018-10-13 NOTE — PLAN OF CARE
Problem: Patient Care Overview  Goal: Plan of Care/Patient Progress Review  Outcome: Improving  Bp's elevated initially upon arrival from cath lab 160/100's, then improved to 140/90's.  Started lisinopril  5 mg this evening and metoprolol ordered for tonight. Plavix loaded in cath lab.  Right groin is c/d/i, cms intake.  Pt did c/o of some intermittent chest pressure upon arrival from cath lab, resolved before intervention.  Off bedrest around 1430 today.  Ambulated in james this evening x1 with nursing.  Now up independently in room.  Tele is SR.  Echo ordered.  Will continue to monitor.   at bedside and updated on plan.

## 2018-10-13 NOTE — IP AVS SNAPSHOT
[facility]    [address]    [place] [address]    Phone:  [phone]                                       After Visit Summary   10/13/2018    Desire Moise    MRN: [id number]           After Visit Summary Signature Page     I have received my discharge instructions, and my questions have been answered. I have discussed any challenges I see with this plan with the nurse or doctor.    ..........................................................................................................................................  Patient/Patient Representative Signature      ..........................................................................................................................................  Patient Representative Print Name and Relationship to Patient    ..................................................               ................................................  Date                                   Time    ..........................................................................................................................................  Reviewed by Signature/Title    ...................................................              ..............................................  Date                                               Time          22EPIC Rev 08/18

## 2018-10-13 NOTE — CONSULTS
Standard post-angio consult received for heart healthy diet education  Pt just back from cath lab  Will plan to see prior to discharge for diet teaching

## 2018-10-13 NOTE — PROGRESS NOTES
Called into patient room, pt complaining of left chest pain radiating into left arm rating it at a 7 after rolling on right side. One nitroglycerin given and pain went down to a one and contained in left chest with no radiation  into left arm. Patient refused anymore nitroglycerin. 12 lead EKG done after first nitroglycerin given. MD was paged with new changes. Will continue to monitor.

## 2018-10-13 NOTE — H&P
Admitted:     10/13/2018      PRIMARY CARE PHYSICIAN:  Matilde Christopher MD      CHIEF COMPLAINT:  Chest pain/transfer from [facility] for angiogram.      HISTORY OF PRESENT ILLNESS:  Ms. Moise is a pleasant 58-year-old female without much of a past medical history who presented to the [facility] emergency facility yesterday evening after developing chest pain.  She actually reported she had been feeling somewhat under the weather nonspecifically for the past week; however, in the last 2 days she had chest pain that was left-sided with radiation.  Finally, at the convincing of her  she sought evaluation at the [facility] Emergency Room.  Evaluation there revealed an initial troponin 0.283 rising up to 1.5.  She was transferred here for angiography.        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  At the time of my interview, the patient is comfortable.  She is not currently in any pain, denies shortness of breath, fevers or chills.      REVIEW OF SYSTEMS:  A 10-point review was conducted and is negative except as above in history of present illness.      PAST MEDICAL HISTORY:   1.  No known prior history of coronary artery disease.   2.  No history of diabetes.   3.  No history of hypertension.      SOCIAL HISTORY:  The patient is a lifelong nonsmoker.  Very rare alcohol use.  She is , lives independently with her .  She is physically active.      FAMILY HISTORY:  Her father has an extensive coronary history with multiple stents.      CURRENT MEDICATIONS:  None.      PHYSICAL EXAMINATION:   VITAL SIGNS:  Blood pressure 147/103, heart rate 61, temp is 98.   GENERAL:  This is a well-nourished  female, appears younger than her stated age.  She is cooperative and appropriate.   HEENT:  She has moist membranes, sclerae are anicteric.   LUNGS:  Clear to auscultation  bilaterally.   HEART:  S1 and S2 present.   ABDOMEN:  Soft, nontender, nondistended.   EXTREMITIES:  No peripheral edema is noted.      LABORATORY STUDIES:  Sodium 140, potassium 4.6, BUN 10, creatinine 0.76.  Most recent troponin 1.566.  White blood cell count 6.5, hemoglobin 12.0, platelets are 229.      IMAGIN.  Chest x-ray:  Impression is negative chest, lungs clear.   2.  Left heart cath:  Impression is nonobstructive coronary artery disease.  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   3.  Elevated left ventricular filling pressures, left ventricular end-diastolic pressure 25 mmHg.      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  Per cardiology, plan is for 1 year of Plavix.  Metoprolol and atorvastatin have been started.  Defer heparinization to the cardiology service.        The patient is otherwise generally healthy.      CODE STATUS:  FULL CODE.      Total time spent, greater than 50% of which involved counseling and coordination of care 45 minutes.         ANDERSON SHEETS MD             D: 10/13/2018   T: 10/13/2018   MT: RIGO      Name:     MIGUEL LACKEY   MRN:      [id number]        Account:      [id number]   :      1960        Admitted:     10/13/2018                   Document: [id number]

## 2018-10-13 NOTE — PROVIDER NOTIFICATION
10/13/18 0400   Significant Event   Significant Event Other (see comments)  (troponin level 0.903 )       353 JG Troponin level 0.903 previous level of 0.283.  Patient already on heparin drip.  No complaints of chest pain.

## 2018-10-13 NOTE — CONSULTS
Consult Date:  10/13/2018      CARDIOLOGY CONSULTATION       PRIMARY CARE PHYSICIAN:  Matilde Christopher MD      REASON FOR CONSULTATION:  I have been asked by Dr. Ruy Ojeda to see Desire Moise for evaluation of elevated troponin levels and ongoing precordial chest tightness.      HISTORY OF PRESENT ILLNESS:  Desire Moise is a pleasant 58-year-old female who presents for precordial chest discomfort occurring for several hours last night and ongoing to this morning.  The patient states that 1 week ago she had 6 hours of precordial chest tightness.  She went to sleep and this resolved by the next morning.  During the week, she has had intermittent tachycardia, especially when in bed at night.  Last night she began experiencing precordial chest discomfort that radiated to her left arm at a 7 to 10/10 level.  She came to the emergency room and was given sublingual nitroglycerin with fairly prompt relief of her symptoms.  She has had several more episodes of the chest tightness responsive to sublingual nitroglycerin.  The patient notes that she has a history of gastroesophageal reflux disease.  She was evaluated in 2015 for chest discomfort which she felt was due to GERD.  A stress echocardiogram at that time was normal.  A previous stress echo in 2011 was also normal.  The patient has never had a known myocardial infarction.  Family history includes father with bypass surgery at age 61.        She does not have known hyperlipidemia.  No diabetes.  She has had labile hypertension with stressful situations in the past.  This has not been treated to date.  She is a lifelong nonsmoker.        Her initial troponin level was 0.283, rising to 0.903 and then 1.566.  She is currently pain-free on IV heparin, but required sublingual nitroglycerin 1 hour ago.      ALLERGIES:  NONE KNOWN ABOUT.      ADMISSION MEDICATIONS:     1.  Tylenol on a p.r.n. basis.    2.  Ibuprofen on a p.r.n. basis.      PAST MEDICAL HISTORY:   1.   GERD.   2.  History of diverticulosis.   3.  Status post total abdominal hysterectomy and oophorectomy.   4.  Fracture of her right wrist with ORIF 09/11/2017.  The hardware was removed 02/02/2018.      No history of peptic ulcer disease, cancer, tuberculosis, kidney stones or disease, hyper or hypothyroidism.      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  She is  and has 3 children from the age of 23-30.  She does not drink alcohol and is a lifelong nonsmoker.      FAMILY HISTORY:  As above.      REVIEW OF SYSTEMS:  A 12-point review of systems is performed with pertinent positives and negatives listed in the HPI.  All other review of systems is asked and are negative.  The patient notes that she was diagnosed with mitral valve prolapse earlier in life, but has never had a known heart murmur.      PHYSICAL EXAMINATION:   VITAL SIGNS:  Current blood pressure 146/92, pulse 76 and regular, the patient is afebrile, 98% saturated on room air.  Weight is 153 pounds (69 kg).   GENERAL:  The patient is an alert white female appearing her stated age, in no acute distress.   HEENT:  Normocephalic, atraumatic without xanthelasma.  No arcus senilis.  No oropharyngeal lesions.  Mucous membranes are moist.   NECK:  Supple without thyromegaly.  Carotid upstrokes are brisk without bruits.   CHEST:  Clear to auscultation without wheezes, rales or rhonchi.  No kyphosis or scoliosis.   CARDIAC:  Regular rate and rhythm, normal S1 and S2 with an S4 gallop, but no S3 or murmur.  No heave, rub or thrill.  No JVD or HJR.  Pulses were all intact without bruits.  No tenderness to palpation across the precordium.   ABDOMEN:  Soft, nontender without organomegaly.  Bowel sounds are present.  No bruits.   EXTREMITIES:  Without cyanosis, clubbing or edema.   SKIN:  Warm, dry and intact.   NEUROLOGIC:  Alert and oriented x 3.  Cranial nerves II-XII grossly intact.     PSYCHIATRIC:   Affect is normal.  Oriented x 3.      EKG from 10/12 at 10:28 p.m. demonstrates normal sinus rhythm with nonspecific scooping of the ST waves in leads II, III, aVF, V4-V6.  No Q-waves or ST elevation.        Followup EKG today at 12:43 a.m. demonstrates normal sinus rhythm without significant ST-T-wave changes.      LABORATORY:  Sodium 141, potassium 3.3, chloride 107, carbon dioxide 29, BUN 12, creatinine 0.78.  Troponin is as above.  Glucose is 100.  TSH 1.42.  White count 6100, hemoglobin 13.6, platelet count 260,000.      Chest x-ray:  I personally reviewed this chest x-ray which shows no infiltrates or masses.  Normal heart size.  This is a normal chest x-ray.      ASSESSMENT:   1.  Desire Moise is a pleasant 58-year-old female who presents for precordial chest discomfort beginning last week and culminating with further discomfort last night and today.  Between that time, she noted some palpitations and both tachycardia and irregular heartbeats.  She notes a previous history of mitral valve prolapse that has not been seen on previous echocardiograms.  Her troponin levels have gradually increased, consistent with a myocardial damage.  This may be from ischemic causes and coronary artery disease.  This may be due to spontaneous coronary artery dissection.  Myocarditis is also possible.  She shows no signs of congestive heart failure.  I would suggest coronary angiography is necessary in order to fully ascertain the etiology of this patient's troponin rise and ongoing chest discomfort.  Because her chest discomfort continues it will be imperative that we perform coronary angiography today.  All risks and benefits of this procedure have been defined to the patient including but not limited to death, heart attack, stroke, blood clots, bleeding including the need for blood transfusion, dye nephropathy, infection, arrhythmia including the need for cardioversion and allergic reaction to the x-ray dye.  We discussed  intracoronary stenting and even bypass surgery.  The patient has agreed to all these risks and benefits and wishes to proceed.  I have discussed this case with cardiology at [facility].  The patient will transfer to [facility] today and the cath lab will then be called once she arrives.  We would continue with IV heparin for the time being.  She is already on a beta blocker.   2.  Hypertension.  We will initiate oral lisinopril to try to control her blood pressure.  This may be long-term hypertension given the diastolic hypertension as well.   3.  Hypokalemia.  This will be replaced.   4.  I went back to  and found her LDL cholesterol is generally in the 120 range.  However, if there is an ischemic cause of her current troponin elevation, a statin should be added.  We will do so for the time being and withdraw this if no coronary artery disease is found.      PLAN:   1.  Transfer to [facility].   2.  Coronary angiography, possible angioplasty, stenting or bypass surgery today.   3.  Initiate aspirin 81 mg daily.   4.  IV heparin.   5.  Nitroglycerin either sublingual or IV as needed.   6.  We will initiate atorvastatin 20 mg daily for the time being.   7.  Lisinopril 10 mg p.o. daily.      It is my pleasure to assist in the care of Miguel Lackey.  Her  was present throughout this examination and history taking.  All their questions were answered to their satisfaction.         MARK EDOUARD MD, [facility]             D: 10/13/2018   T: 10/13/2018   MT: NTS      Name:     MIGUEL LACKEY   MRN:      [id number]        Account:       [id number]   :      1960           Consult Date:  10/13/2018      Document: [id number]       cc: Matilde Christopher MD

## 2018-10-13 NOTE — PROGRESS NOTES
353 JG is having chest pain 5/10. She has no nitro orders. Can I get an order for this please? Thank you    Order received for nitroglycerin.

## 2018-10-13 NOTE — PROGRESS NOTES
353 JG- pt having chest pain. Was 7/10 before nitro. Now pain is a 1/10- refusing more nitro. Patient also complained of nausea and anti emetics given with some relief. 12 lead EKG was done. Could you come and read EKG and see patient please. Thank you    Call back from Dr. Bhagat and MD requested that cardiology be paged to call him back to discuss patient. Will pass onto oncoming nurse.

## 2018-10-13 NOTE — ED TRIAGE NOTES
Patient presents to ED due CP. States having an episode of chest discomfort earlier this week and symptoms subsided. Has been having palpiations on and off.     CP developed 2 hrs ago and pain is worse radiating to L arm

## 2018-10-13 NOTE — PLAN OF CARE
"Problem: Patient Care Overview  Goal: Plan of Care/Patient Progress Review  Outcome: No Change  BP (!) 161/95  Pulse 73  Temp 95.4  F (35.2  C) (Oral)  Resp 16  Ht 1.676 m (5' 6\")  Wt 69.4 kg (153 lb 1.6 oz)  LMP 06/15/2003  SpO2 98%  BMI 24.71 kg/m2    VSS. BP slightly elevated. Denies any chest pain. Trop 1.566. K (after replacement) 4.6. Mg 2.6. Pt will transfer to [facility] at 1100 via [facility] for an angio. I gave pt educational material on angios and answered all questions in detail.  is taking all pt belongings with him.       "

## 2018-10-13 NOTE — ED NOTES
[facility]  ED Nurse Handoff Report    Desire Moise is a 58 year old female   ED Chief complaint: No chief complaint on file.  . ED Diagnosis:   Final diagnoses:   NSTEMI (non-ST elevated myocardial infarction) (H)   Hypertensive emergency     Allergies: No Known Allergies    Code Status: Full Code  Activity level - Baseline/Home:  Independent. Activity Level - Current:   Independent. Lift room needed: No. Bariatric: No   Needed: No   Isolation: No. Infection: Not Applicable.     Vital Signs:   Vitals:    10/13/18 0045 10/13/18 0046 10/13/18 0100 10/13/18 0105   BP: (!) 155/100 (!) 155/100 (!) 153/96    Pulse:  73     Resp:   13 20   Temp:       SpO2: 96% 96% 97% 99%   Weight:       Height:           Cardiac Rhythm:  ,   Cardiac  Cardiac Rhythm: Normal sinus rhythm  Pain level: 0-10 Pain Scale: 0  Patient confused: No. Patient Falls Risk: No.   Elimination Status: Has voided   Patient Report - Initial Complaint: Chest pain for approximately 1 week. Focused Assessment: Desire Moise is a 58 year old female who presents to the emergency department today for evaluation of chest pain. The patient reports she has had intermittent chest pain for the past week. Today she noticed the pressure in her chest tightened and radiated to her left arm. She describes it as a tight pressure. Due to these worsening symptoms she presented to the emergency department today. She noticed for the past week she has had a rapid heart rate, and that later in the day the chest tightness follows. She says that exertion and deep breathes don't worsen her pain. She says it feels as if her heart is skipping beats intermittently. She does note that she has had a rapid heart beat for years, but she doesn't usually notice it like she has this past week. She has a blood pressure cuff at home and says that when her blood pressure rises the tightness in her chest worsens as well. Additionally, she does a family history of  heart disease. She endorses night sweats. She denies urinary symptoms, coughing, abdominal pain  and fever.   Tests Performed:   Labs Ordered and Resulted from Time of ED Arrival Up to the Time of Departure from the ED   BASIC METABOLIC PANEL - Abnormal; Notable for the following:        Result Value    Potassium 3.3 (*)     Glucose 100 (*)     All other components within normal limits   TROPONIN I - Abnormal; Notable for the following:     Troponin I ES 0.283 (*)     All other components within normal limits   ROUTINE UA WITH MICROSCOPIC - Abnormal; Notable for the following:     Blood Urine Small (*)     Leukocyte Esterase Urine Small (*)     RBC Urine 3 (*)     Mucous Urine Present (*)     All other components within normal limits   CBC WITH PLATELETS DIFFERENTIAL   CBC WITH PLATELETS   PULSE OXIMETRY NURSING   CARDIAC CONTINUOUS MONITORING   PATIENT CARE ORDER   VITAL SIGNS   PERIPHERAL IV CATHETER   PLATELETS MONITORED PER HEPARIN TREATMENT PROTOCOL (FOR MEANINGFUL USE   MEASURE WEIGHT   NOTIFY PHYSICIAN   NOTIFY PHYSICIAN     XR Chest 2 Views   Final Result   IMPRESSION: Negative chest. Lungs clear.      AMANDA SULLIVAN MD         Abnormal Results: see above.   Treatments provided: see MAR  Family Comments:  present with pt, appropriate interactions  OBS brochure/video discussed/provided to patient:  N/A  ED Medications:   Medications   heparin infusion 25,000 units in 0.45% NaCl 250 mL (750 Units/hr Intravenous New Bag 10/13/18 0131)   morphine (PF) injection 4 mg (not administered)   hydrALAZINE (APRESOLINE) injection 5 mg (not administered)   lidocaine (viscous) (XYLOCAINE) 2 % 15 mL, alum & mag hydroxide-simethicone (MYLANTA ES/MAALOX  ES) 15 mL GI Cocktail (30 mLs Oral Given 10/13/18 0000)   ketorolac (TORADOL) injection 15 mg (15 mg Intravenous Given 10/13/18 0000)   nitroGLYcerin (NITROSTAT) sublingual tablet 0.4 mg (0.4 mg Sublingual Given 10/13/18 0105)   aspirin tablet 325 mg (325 mg Oral  Given 10/13/18 0038)   heparin Loading Dose bolus dose from infusion pump 3,800 Units (3,800 Units Intravenous Given 10/13/18 0131)     Drips infusing:  Yes-Heparin  For the majority of the shift, the patient's behavior Green. Interventions performed were none.     Severe Sepsis OR Septic Shock Diagnosis Present: No      ED Nurse Name/Phone Number: Julián Cardenas,   1:37 AM    RECEIVING UNIT ED HANDOFF REVIEW    Above ED Nurse Handoff Report was reviewed: Yes  Reviewed by: Isaura Alvarenga on October 13, 2018 at 1:57 AM

## 2018-10-14 ENCOUNTER — APPOINTMENT (OUTPATIENT)
Dept: CARDIOLOGY | Facility: CLINIC | Age: 58
DRG: 280 | End: 2018-10-14
Attending: STUDENT IN AN ORGANIZED HEALTH CARE EDUCATION/TRAINING PROGRAM
Payer: COMMERCIAL

## 2018-10-14 ENCOUNTER — APPOINTMENT (OUTPATIENT)
Dept: ULTRASOUND IMAGING | Facility: CLINIC | Age: 58
DRG: 280 | End: 2018-10-14
Attending: INTERNAL MEDICINE
Payer: COMMERCIAL

## 2018-10-14 VITALS
WEIGHT: 151.01 LBS | RESPIRATION RATE: 16 BRPM | OXYGEN SATURATION: 97 % | BODY MASS INDEX: 24.37 KG/M2 | DIASTOLIC BLOOD PRESSURE: 78 MMHG | TEMPERATURE: 99 F | SYSTOLIC BLOOD PRESSURE: 125 MMHG

## 2018-10-14 LAB
ANION GAP SERPL CALCULATED.3IONS-SCNC: 7 MMOL/L (ref 3–14)
BUN SERPL-MCNC: 12 MG/DL (ref 7–30)
CALCIUM SERPL-MCNC: 8.8 MG/DL (ref 8.5–10.1)
CHLORIDE SERPL-SCNC: 107 MMOL/L (ref 94–109)
CO2 SERPL-SCNC: 26 MMOL/L (ref 20–32)
CREAT SERPL-MCNC: 0.81 MG/DL (ref 0.52–1.04)
ERYTHROCYTE [DISTWIDTH] IN BLOOD BY AUTOMATED COUNT: 13.6 % (ref 10–15)
GFR SERPL CREATININE-BSD FRML MDRD: 73 ML/MIN/1.7M2
GLUCOSE SERPL-MCNC: 97 MG/DL (ref 70–99)
HCT VFR BLD AUTO: 39.4 % (ref 35–47)
HGB BLD-MCNC: 12.7 G/DL (ref 11.7–15.7)
INTERPRETATION ECG - MUSE: NORMAL
INTERPRETATION ECG - MUSE: NORMAL
MCH RBC QN AUTO: 28.7 PG (ref 26.5–33)
MCHC RBC AUTO-ENTMCNC: 32.2 G/DL (ref 31.5–36.5)
MCV RBC AUTO: 89 FL (ref 78–100)
PLATELET # BLD AUTO: 239 10E9/L (ref 150–450)
POTASSIUM SERPL-SCNC: 3.9 MMOL/L (ref 3.4–5.3)
RBC # BLD AUTO: 4.42 10E12/L (ref 3.8–5.2)
SODIUM SERPL-SCNC: 140 MMOL/L (ref 133–144)
WBC # BLD AUTO: 7.1 10E9/L (ref 4–11)

## 2018-10-14 PROCEDURE — 25000132 ZZH RX MED GY IP 250 OP 250 PS 637: Performed by: INTERNAL MEDICINE

## 2018-10-14 PROCEDURE — 99239 HOSP IP/OBS DSCHRG MGMT >30: CPT | Performed by: INTERNAL MEDICINE

## 2018-10-14 PROCEDURE — 40000894 ZZH STATISTIC OT IP EVAL DEFER

## 2018-10-14 PROCEDURE — 85027 COMPLETE CBC AUTOMATED: CPT | Performed by: STUDENT IN AN ORGANIZED HEALTH CARE EDUCATION/TRAINING PROGRAM

## 2018-10-14 PROCEDURE — 80048 BASIC METABOLIC PNL TOTAL CA: CPT | Performed by: STUDENT IN AN ORGANIZED HEALTH CARE EDUCATION/TRAINING PROGRAM

## 2018-10-14 PROCEDURE — 93926 LOWER EXTREMITY STUDY: CPT | Mod: RT

## 2018-10-14 PROCEDURE — 36415 COLL VENOUS BLD VENIPUNCTURE: CPT | Performed by: STUDENT IN AN ORGANIZED HEALTH CARE EDUCATION/TRAINING PROGRAM

## 2018-10-14 PROCEDURE — 25000132 ZZH RX MED GY IP 250 OP 250 PS 637: Performed by: STUDENT IN AN ORGANIZED HEALTH CARE EDUCATION/TRAINING PROGRAM

## 2018-10-14 PROCEDURE — 25500064 ZZH RX 255 OP 636: Performed by: INTERNAL MEDICINE

## 2018-10-14 PROCEDURE — 99207 ZZC CDG-CODE INCORRECT PER BILLING BASED ON TIME: CPT | Performed by: INTERNAL MEDICINE

## 2018-10-14 PROCEDURE — 40000264 ECHO COMPLETE WITH OPTISON

## 2018-10-14 PROCEDURE — 99232 SBSQ HOSP IP/OBS MODERATE 35: CPT | Mod: 25 | Performed by: INTERNAL MEDICINE

## 2018-10-14 PROCEDURE — 93306 TTE W/DOPPLER COMPLETE: CPT | Mod: 26 | Performed by: INTERNAL MEDICINE

## 2018-10-14 RX ORDER — LISINOPRIL 10 MG/1
10 TABLET ORAL DAILY
Status: DISCONTINUED | OUTPATIENT
Start: 2018-10-14 | End: 2018-10-14 | Stop reason: HOSPADM

## 2018-10-14 RX ORDER — LISINOPRIL 10 MG/1
10 TABLET ORAL DAILY
Qty: 30 TABLET | Refills: 3 | Status: SHIPPED | OUTPATIENT
Start: 2018-10-15 | End: 2019-02-08

## 2018-10-14 RX ORDER — METOPROLOL TARTRATE 25 MG/1
25 TABLET, FILM COATED ORAL 2 TIMES DAILY
Qty: 60 TABLET | Refills: 3 | Status: SHIPPED | OUTPATIENT
Start: 2018-10-14 | End: 2019-01-15

## 2018-10-14 RX ORDER — NITROGLYCERIN 0.4 MG/1
TABLET SUBLINGUAL
Qty: 25 TABLET | Refills: 0 | Status: SHIPPED | OUTPATIENT
Start: 2018-10-14 | End: 2018-10-14

## 2018-10-14 RX ORDER — NITROGLYCERIN 0.4 MG/1
TABLET SUBLINGUAL
Qty: 25 TABLET | Refills: 0 | Status: SHIPPED | OUTPATIENT
Start: 2018-10-14 | End: 2019-02-04

## 2018-10-14 RX ORDER — CLOPIDOGREL BISULFATE 75 MG/1
75 TABLET ORAL DAILY
Qty: 30 TABLET | Refills: 3 | Status: SHIPPED | OUTPATIENT
Start: 2018-10-15 | End: 2019-02-08

## 2018-10-14 RX ORDER — ATORVASTATIN CALCIUM 40 MG/1
40 TABLET, FILM COATED ORAL DAILY
Qty: 30 TABLET | Refills: 3 | Status: SHIPPED | OUTPATIENT
Start: 2018-10-14 | End: 2018-10-29 | Stop reason: SINTOL

## 2018-10-14 RX ADMIN — ACETAMINOPHEN 650 MG: 325 TABLET, FILM COATED ORAL at 13:27

## 2018-10-14 RX ADMIN — LISINOPRIL 10 MG: 10 TABLET ORAL at 09:42

## 2018-10-14 RX ADMIN — HUMAN ALBUMIN MICROSPHERES AND PERFLUTREN 9 ML: 10; .22 INJECTION, SOLUTION INTRAVENOUS at 11:15

## 2018-10-14 RX ADMIN — METOPROLOL TARTRATE 25 MG: 25 TABLET ORAL at 09:42

## 2018-10-14 RX ADMIN — CLOPIDOGREL 75 MG: 75 TABLET, FILM COATED ORAL at 09:42

## 2018-10-14 RX ADMIN — ASPIRIN 81 MG: 81 TABLET, COATED ORAL at 09:42

## 2018-10-14 ASSESSMENT — PAIN DESCRIPTION - DESCRIPTORS
DESCRIPTORS: ACHING
DESCRIPTORS: HEADACHE

## 2018-10-14 NOTE — PROGRESS NOTES
[facility]    Cardiology Progress Note     Assessment & Plan   Desire Moise is a 58 year old female who presented to a local emergency department this morning for a 6-hour history of precordial chest pain, that radiated down her left arm.  She has a history of prior chest discomfort in 2015, which was attributed to GERD.  She denies any history of hypertension, hyperlipidemia, or tobacco use.  She was evaluated by Dr. Zuleta in the [facility] Emergency Department.  Electrocardiogram illustrated normal sinus rhythm with nonspecific ST-T wave changes.  Troponin was mildly elevated and she was, transferred to [facility] for coronary angiography.  Coronary angiogram illustrated a large left main, LAD, and left circumflex.  The RCA was also a large caliber vessel with no angiographic evidence of disease.  The OM1 off of the left circumflex did taper in size rapidly at the mid segment very suggestive of coronary artery dissection, but there was EMMIE-3 flow.  No evidence of atherosclerotic disease.  Left heart catheterization illustrated an LV EDP of 25 mmHg.  Left ventriculography showed mild hypokinesis of the basal anterior lateral wall, LVEF 45-55%) and no evidence of mitral valve regurgitation.  She was initiated on aspirin 81 mg daily and Plavix 75 mg daily in addition to Lipitor 40 mg daily.  Metoprolol tartrate 25 mg daily was also started.  Lisinopril 5 mg daily will be added as well for further blood pressure control.    #1 SCAD  I discussed with Mrs. Moise the pathophysiology behind spontaneous coronary artery dissection, and she did receive reading material regarding this diagnosis. We will plan to continue aspirin and Plavix.  Atorvastatin was also added given evidence of hyperlipidemia from her laboratory evaluation. Beta-blockers have been shown to reduce the risk of recurrence and scad, so I will continue metoprolol tartrate 25 mg twice daily.  I will obtain a transthoracic  echocardiogram tomorrow to assess LV function.    -Continue aspirin 81 mg daily and Plavix 75 mg daily  -TTE tomorrow  -Continue metoprolol tartrate 25 mg twice daily and lisinopril 5 mg daily      # Pain Assessment:  Current Pain Score 10/13/2018   Patient currently in pain? -   Pain score (0-10) 3   Pain location -   Pain descriptors -   Desire friedman pain level was assessed and she currently denies pain.      Tona Sanders MD      Physical Exam   Temp: 98.1  F (36.7  C) Temp src: Oral BP: (!) 124/95   Heart Rate: 70 Resp: 18 SpO2: 100 % O2 Device: None (Room air)    There were no vitals filed for this visit.  Vital Signs with Ranges  Temp:  [95.4  F (35.2  C)-99.3  F (37.4  C)] 98.1  F (36.7  C)  Pulse:  [73] 73  Heart Rate:  [61-76] 70  Resp:  [9-23] 18  BP: (124-190)/() 124/95  SpO2:  [90 %-100 %] 100 %  I/O last 3 completed shifts:  In: 120 [P.O.:120]  Out: 978 [Urine:975; Emesis/NG output:3]  Patient Active Problem List   Diagnosis     Pain in joint, pelvic region and thigh     Family history of malignant neoplasm of breast     Family history of other cardiovascular diseases     Spina bifida occulta     Anemia     ACS (acute coronary syndrome) (H)     NSTEMI (non-ST elevated myocardial infarction) (H)      Constitutional: No apparent distress.   Eyes: No xanthelasma or conjunctivitis  Respiratory: Clear to auscultation bilaterally. No crackles or wheezes.  Cardiovascular: No JVP elevation.  No RV heave.  PMI not laterally displaced.  No HJR.  Regular rate and rhythm. Normal S1 and S2. No murmurs. No extra heart sounds. No JVD.   Extremities: No peripheral edema.  Neurologic: Moving all extremities. No facial assymmetry.  Psychiatric: Alert and oriented. Answers questions appropriately.     Medications     - MEDICATION INSTRUCTIONS -       Percutaneous Coronary Intervention orders placed (this is information for BPA alerting)       Reason ACE/ARB/ARNI order not selected         [START ON 10/14/2018] aspirin   81 mg Oral Daily     atorvastatin  40 mg Oral Daily     [START ON 10/14/2018] clopidogrel  75 mg Oral Daily     [START ON 10/14/2018] enoxaparin  40 mg Subcutaneous Q24H     lisinopril  5 mg Oral Daily     metoprolol tartrate  25 mg Oral BID     sodium chloride (PF)  3 mL Intracatheter Q8H       Data   Results for orders placed or performed during the hospital encounter of 10/13/18 (from the past 24 hour(s))   Heart Cath Left heart cath    Narrative    Procedures:  Selective left and right coronary angiography  Left heart catheterization  Femoral angiography  Arteriotomy closed with a closure device    PHYSICIANS:  Attending Physician: John Linder MD  Interventional Cardiology Fellow: Fede Barney MD    HPI/INDICATION:  58-year-old female with no known cardiac risk factors presents on in  urgent inpatient basis for coronary angiography to assess non-ST  elevation myocardial infarction. She reports stuttering chest pain for  the last week with 6 hours of unrelenting pain yesterday that finally  was relieved with nitroglycerin. She had one further episode this  morning that again was relieved with nitroglycerin. ECG does not  reveal any ischemic changes.    DESCRIPTION:  1. The patient was reassessed and consented prior to administration of  sedation. Consent obtained with discussion of risks. All questions  were answered.  2. Sterile prep and procedure.  3. Location with Sheaths: 6 Fr standard sheath right common femoral  artery  4. Access: Local anesthetic with lidocaine. A standard 18-gauge needle  was used to establish vascular access using a modified Seldinger  technique.  5. Catheters:6 Fr JL4, 3DRC, angled pigtail  6. Estimated blood loss: < 25 ml    MEDICATIONS:  The procedure was performed under conscious sedation for 15?minutes  from 1135 to 1150.  The patient was assessed immediately before the first sedation  medication was administered. Midazolam 1mg and fentanyl 50mcg?were  administered.  Heart  rate, BP, respiration, oxygen saturation and patient responses  were monitored throughout the procedure with the assistance of the RN  under my supervision.  Antiplatelet Therapy: ASA and clopidogrel    Procedures:  Coronary Angiogram:   -Both coronary arteries arise from their respective cusps.  -Dominance: Right  -LM is a large caliber vessel which bifurcates into an LAD and LCx.  There is no angiographic evidence of disease.  -LAD is a large-caliber vessel which supplies the entire apex (type  3). The LAD gives rise to septal perforators, small-caliber D1,  small-caliber D2, and small-caliber D3. The remainder of the LAD has  minimal luminal irregularities.  -LCX is a large-caliber vessel and gives rise to small-caliber OM1  before continuing as a small AV-groove LCx. The OM1 tapers in size  rapidly in the midsegment suggesting the possibility of coronary  artery dissection. There is EMMIE III flow. There is no atherosclerotic  disease.  -RCA is a large-caliber vessel and gives rise to PL branches and  supplies the PDA. There is no angiographic evidence of disease.    Left Heart Catheterization:  1. LVEDP 25 mmHg  2. No gradient across the aortic valve on pull back.  3. Left ventriculography showed mild hypokinesis of the basal  anterolateral wall, LVEF 45-55%, and no evidence of mitral  regurgitation.    Femoral Angiography:  The right external iliac artery and common femoral arteries showed no  evidence of significant peripheral arterial disease. Access site was  confirmed in the common femoral artery.    Arteriotomy Closure:  A 6Fr Angio-Seal device was used for arteriotomy closure.    Contrast: Isovue 85 mL    Fluoroscopy Time: 1.9 min  Air Kerma: 0.107 Gy  DAP: 9680 mGycm2    COMPLICATIONS:  None    SUMMARY:   1. Non-ST elevation myocardial infarction.   2. Minimal nonobstructive coronary artery disease (luminal  irregularities).  3. High suspicion for spontaneous coronary artery dissection of a  small OM1  with EMMIE III flow.  4. Elevated left ventricular filling pressures with left ventricular  end-diastolic pressure 25 mmHg.  5. Mild hypokinesis of the basal anterolateral wall with mildly  reduced systolic function (LVEF 45-55%).    PLAN:   Admit to inpatient service. Echocardiogram pending.  Aspirin 81 mg daily indefinitely.  Clopidogrel 75 mg daily for at least 12 months given NSTEMI (CURE  trial and SCAD data).  Metoprolol tartrate 25 mg twice a day.  Atorvastatin 40 mg daily.  Bedrest per protocol.  Continued medical management and lifestyle modification for  cardiovascular risk factor optimization.    The attending interventional cardiologist, Dr. Linder, was present and  supervised all critical aspects the procedure.    Fede Barney MD  Interventional Cardiology Fellow   Nutrition Services Adult IP Consult    Narrative    Dorita Hurtado, RD, LD     10/13/2018 12:38 PM  Standard post-angio consult received for heart healthy diet   education  Pt just back from cath lab  Will plan to see prior to discharge for diet teaching   EKG 12-lead, tracing only    Result Value Ref Range    Interpretation ECG Click View Image link to view waveform and result    Lipid Profile   Result Value Ref Range    Cholesterol 209 (H) <200 mg/dL    Triglycerides 54 <150 mg/dL    HDL Cholesterol 83 >49 mg/dL    LDL Cholesterol Calculated 115 (H) <100 mg/dL    Non HDL Cholesterol 126 <130 mg/dL   Creatinine   Result Value Ref Range    Creatinine 0.77 0.52 - 1.04 mg/dL    GFR Estimate 77 >60 mL/min/1.7m2    GFR Estimate If Black >90 >60 mL/min/1.7m2   Platelet count   Result Value Ref Range    Platelet Count 232 150 - 450 10e9/L   EKG 12-lead, tracing only - STAT   Result Value Ref Range    Interpretation ECG Click View Image link to view waveform and result

## 2018-10-14 NOTE — DISCHARGE INSTRUCTIONS
Going Home after ANGIOGRAM     Name: Desire Moise  Medical Record Number:  [id number]  Today's Date: October 14, 2018        For 24 hours:         Have an adult stay with you for 24 hours.         Relax and take it easy.         Drink plenty of fluids.         You may eat your normal diet, unless your doctor tells you otherwise.         Do NOT make any important or legal decisions.         Do NOT drive or operate machines at home or at work.         Do NOT drink alcohol.      Do NOT smoke.     Medicines:         If you have begun Plavix (clopidogrel), Effient (prasugrel), or Brilinta (ticagrelor), do not stop taking it until you talk to your heart doctor (cardiologist).         If you are on metformin (Glucophage), do not restart it until you have blood tests (within 2 to 3 days after discharge). When your doctor tells you it is safe, you may restart the metformin.         If you have stopped any other medicines, check with your nurse or provider about when to restart them.    Care of groin site:         Remove the Band-Aid after 24 hours. If there is minor oozing, apply another Band-aid and remove it after 12 hours.          Do NOT take a bath, or use a hot tub or pool for at least 3 days. You may shower.          It is normal to have a small bruise or lump at the site.         Do not scrub the site.         Do not use lotion or powder near the puncture site for 3 days.         For the first 2 days: Do not stoop or squat. When you cough, sneeze or move your bowels, hold your hand over the puncture site and press gently.         Do not lift more than 10 pounds for at least 3 to 5 days.         For 2 days, do NOT have sex or do any heavy exercise.     If you start bleeding from the site in your groin:  Lie down flat and press firmly on the site.  Call your physician immediately, or, come to the emergency room.      Call 911 right away if you have bleeding that is heavy or does not stop.     Call your doctor if:          You have a large or growing hard lump around the site.         The site is red, swollen, hot or tender.         Blood or fluid is draining from the site.         You have chills or a fever greater than 101 F (38 C).         Your leg or arm turns bluish, feels numb or cool.         You have hives, a rash or unusual itching.     Cardiac Rehabilitation: You should receive a phone call from the Cardiac Rehabilitation Department within the next week.  If you do not receive the call, please contact [facility] at [phone].    ADDITIONAL INSTRUCTIONS:     [facility] [place]:   [phone] (7 days a week)      Cardiology Fellow on call (24 hours per day) at [facility], [place]:   [phone] (ask for Cardiology Fellow on call)      Number where we can reach you:  ____________

## 2018-10-14 NOTE — DISCHARGE SUMMARY
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 Moise is a pleasant 58-year-old female without much of a past medical history who presented to the [facility] emergency facility yesterday evening after developing chest pain.  She actually reported she had been feeling somewhat under the weather nonspecifically for the past week; however, in the last 2 days she had chest pain that was left-sided with radiation.  Finally, at the convincing of her  she sought evaluation at the [facility] Emergency Room.  Evaluation there revealed an initial troponin 0.283 rising up to 1.5.  She was transferred here for angiography.         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  At the time of my interview, the patient is comfortable.  She is not currently in any pain, denies shortness of breath, fevers or chills.     Hospital Course   Desire Moise was admitted on 10/13/2018.  The following problems were addressed during her hospitalization:    Active Problems:    NSTEMI (non-ST elevated myocardial infarction) (H)  See angiogram results below. Mild CAD, NSTEMI likely related to spontaneous dissection of OM1 branch.    Follow up echo showed    Interpretation Summary     The visual ejection fraction is estimated at 50-55%.  There is severe mid and basal inferolateral and lateral wall hypokinesis.  The study was technically difficult. Contrast was used without apparent  Complications.  Trivial Pericardial effusion.    Started on Lipitor, metoprolol, ASA, Plavix.  Continue on ASA/Plavix for one year.    Jose Cruz Fragoso MD    Significant Results and Procedures    Coronary angiogram  SUMMARY:   1. Non-ST elevation myocardial infarction.   2. Minimal nonobstructive coronary artery disease (luminal  irregularities).  3. High suspicion for spontaneous coronary artery dissection of a  small OM1 with EMMIE III flow.  4. Elevated left ventricular filling pressures with left ventricular  end-diastolic pressure 25 mmHg.  5. Mild hypokinesis of the basal anterolateral wall with mildly  reduced systolic function (LVEF 45-55%).    PLAN:   Admit to inpatient service. Echocardiogram pending.  Aspirin 81 mg daily indefinitely.  Clopidogrel 75 mg daily for at least 12 months given NSTEMI (CURE  trial and SCAD data).  Metoprolol tartrate 25 mg twice a day.  Atorvastatin 40 mg daily.  Bedrest per protocol.  Continued medical management and lifestyle modification for  cardiovascular risk factor optimization.      Unresulted Labs Ordered in the Past 30 Days of this Admission     No orders found for last 61 day(s).          Code Status   Full Code       Primary Care Physician   Matilde Christopher    Physical Exam   Temp: 97.8  F (36.6  C) Temp src: Oral BP: 132/87   Heart Rate: (P) 62 Resp: 16 SpO2: 97 % O2 Device: None (Room air)    Vitals:    10/14/18 0630   Weight: 68.5 kg (151 lb 0.2 oz)     Vital Signs with Ranges  Temp:  [97.8  F (36.6  C)-99.3  F (37.4  C)] 97.8  F (36.6  C)  Heart Rate:  [60-74] (P) 62  Resp:  [12-23] 16  BP: (118-190)/() 132/87  SpO2:  [97 %-100 %] 97 %  I/O last 3 completed shifts:  In: 1180 [P.O.:1180]  Out: 1853 [Urine:1850; Emesis/NG output:3]    Constitutional: Awake, alert, cooperative, no apparent distress.  Eyes: Conjunctiva and pupils examined and normal.  HEENT: Moist mucous membranes, normal dentition.  Respiratory: Clear to auscultation bilaterally, no crackles or wheezing.  Cardiovascular: Regular rate and rhythm, normal S1 and S2, and no murmur noted.  GI: Soft, non-distended, non-tender,   Skin: No rashes, no cyanosis, no edema.  Musculoskeletal: No  joint swelling, erythema or tenderness.  Neurologic: Cranial nerves 2-12 intact, normal strength and sensation.    Discharge Disposition   Discharged to home  Condition at discharge: Good    Consultations This Hospital Stay   CARDIAC REHAB IP CONSULT  NUTRITION SERVICES ADULT IP CONSULT  PHARMACY IP CONSULT  PHARMACY IP CONSULT  CARDIOLOGY IP CONSULT  SMOKING CESSATION PROGRAM IP CONSULT  SMOKING CESSATION PROGRAM IP CONSULT    Time Spent on this Encounter   Jose Cruz MAZARIEGOS, personally saw the patient today and spent greater than 30 minutes discharging this patient.    Discharge Orders     CARDIAC REHAB REFERRAL       Discharge Medications   Current Discharge Medication List      CONTINUE these medications which have NOT CHANGED    Details   acetaminophen (TYLENOL) 325 MG tablet Take 2 tablets (650 mg) by mouth every 4 hours as needed for other (mild pain)  Qty: 30 tablet, Refills: 0    Associated Diagnoses: Closed Colles' fracture of right radius, sequela           Allergies   No Known Allergies  Data

## 2018-10-14 NOTE — PLAN OF CARE
Problem: Patient Care Overview  Goal: Plan of Care/Patient Progress Review  Outcome: No Change  [id number] RN: Pt a/o, VSS on RA. Tele SR. PRN Tylenol given x1 for c/o headache, no chest pain overnight. Up independently, ambulated halls with  x1. Tolerating PO, voiding. Right groin site c/d/i, no bruit or hematoma noted. CMS intact. Pt receiving Metoprolol, Plavix, Lisinopril, and Lipitor. Will continue to monitor.

## 2018-10-14 NOTE — PLAN OF CARE
Problem: Patient Care Overview  Goal: Plan of Care/Patient Progress Review  Discharge Planner OT   Patient plan for discharge: home w/family today   Current status: Orders received for cardiac rehab , chart reviewed on 57yo old female admitted with NSTEMI, dx'd with SCAD. Per consult with cardiologist, nursing and patient it was determined that patient would best be served by foregoing inpatient cardiac rehab (as discharging home today and has been up ambulating independently on own and with nursing) and initiating with OP CR. Pt in agreement and prefers to attend at [place] location. Inpatient OT to sign off and complete order.  Barriers to return to prior living situation: none noted  Recommendations for discharge: home w/OP CR  Rationale for recommendations: pt discharging home today, will benefit from progressive skilled exercise program in OP setting to maximize recovery       Entered by: Lei Velasco 10/14/2018 11:37 AM

## 2018-10-14 NOTE — PLAN OF CARE
MD Notification    Notified Person: MD    Notified Person Name: Dr. Sanders    Notification Date/Time: 10-14-18 124    Notification Interaction: phone call    Purpose of Notification: pt sitting in chair c/o of groin pain right side at angio site, tender with pea size lump, cms intact, pulses +2.      Orders Received: Will check US    Comments:

## 2018-10-14 NOTE — PLAN OF CARE
Problem: Patient Care Overview  Goal: Plan of Care/Patient Progress Review  Outcome: Adequate for Discharge Date Met: 10/14/18  VSS.  Right groin site c/d/i, US neg for any bleeding.  Ok to discharge to home.  Discharge instructions reviewed with pt and spouse.  New meds reviewed lisinopril, metoprolol, lipitor, plavix, asa, and nitroglycerin.  Literature given.  Meds filled at discharge pharmacy and sent with pt.  Nitroglycerin was sent to [facility] b/c pharmacy was closed when it was ordered, spouse will .  Groin site care and monitoring returned.  Pt will make f/u with primary in 1 week, and orders placed for cards f/u and cardiac rehab ordered.  LDA removed, belongings returned.  All questions answered.  Pt discharged to home with spouse.

## 2018-10-14 NOTE — PROGRESS NOTES
Uneventful night.  Getting echo now.  If echo is ok can discharge this afternoon.    Jose Cruz Fragoso MD

## 2018-10-15 ENCOUNTER — TELEPHONE (OUTPATIENT)
Dept: FAMILY MEDICINE | Facility: CLINIC | Age: 58
End: 2018-10-15

## 2018-10-15 NOTE — TELEPHONE ENCOUNTER
ED / Discharge Outreach Protocol    Patient Contact    Pt is not seen in the [facility] system    Erika Branch RN

## 2018-10-15 NOTE — PROGRESS NOTES
[facility]    Cardiology Progress Note     Assessment & Plan   Desire Moise is a 58 year old female who was admitted on 10/13/2018 after ACS. Coronary angiogram illustrated a large left main, LAD, and left circumflex. The RCA was also a large caliber vessel with no angiographic evidence of disease. The OM1 off of the left circumflex did taper in size rapidly at the mid segment very suggestive of coronary artery dissection, but there was EMMIE-3 flow. No evidence of atherosclerotic disease. Left heart catheterization illustrated an LV EDP of 25 mmHg. TTE illustrated EF of 50 to 55%, with severe lateral wall hypokinesis.    #1 SCAD  - Discharge today with outpatient follow-up in approximately 7 days for full SCAD evaluation (including further imaging)  - Continue aspirin and Plavix, metoprolol tartrate 25 mg BID, lisinopril 10 mg, and atorvastatin 40 mg daily    Tona Sanders MD    Interval History   Denied any complaints     Physical Exam   Temp: 99  F (37.2  C) Temp src: Oral BP: 125/78   Heart Rate: 55 Resp: 16 SpO2: 97 % O2 Device: None (Room air)    Vitals:    10/14/18 0630   Weight: 68.5 kg (151 lb 0.2 oz)     Vital Signs with Ranges  Temp:  [97.8  F (36.6  C)-99  F (37.2  C)] 99  F (37.2  C)  Heart Rate:  [55-65] 55  Resp:  [16] 16  BP: (118-132)/(78-87) 125/78  SpO2:  [97 %-98 %] 97 %  I/O last 3 completed shifts:  In: 840 [P.O.:840]  Out: 200 [Urine:200]  Patient Active Problem List   Diagnosis     Pain in joint, pelvic region and thigh     Family history of malignant neoplasm of breast     Family history of other cardiovascular diseases     Spina bifida occulta     Anemia     ACS (acute coronary syndrome) (H)     NSTEMI (non-ST elevated myocardial infarction) (H)      Constitutional: No apparent distress.   Eyes: No xanthelasma or conjunctivitis  Respiratory: Clear to auscultation bilaterally. No crackles or wheezes.  Cardiovascular: No JVP elevation.  No RV heave.  PMI not laterally  displaced.  No HJR.  Regular rate and rhythm. Normal S1 and S2. No murmurs. No extra heart sounds. No JVD.   Extremities: No peripheral edema.  Neurologic: Moving all extremities. No facial assymmetry.  Psychiatric: Alert and oriented. Answers questions appropriately    Medications       Data   Results for orders placed or performed during the hospital encounter of 10/13/18 (from the past 24 hour(s))   Basic metabolic panel   Result Value Ref Range    Sodium 140 133 - 144 mmol/L    Potassium 3.9 3.4 - 5.3 mmol/L    Chloride 107 94 - 109 mmol/L    Carbon Dioxide 26 20 - 32 mmol/L    Anion Gap 7 3 - 14 mmol/L    Glucose 97 70 - 99 mg/dL    Urea Nitrogen 12 7 - 30 mg/dL    Creatinine 0.81 0.52 - 1.04 mg/dL    GFR Estimate 73 >60 mL/min/1.7m2    GFR Estimate If Black 88 >60 mL/min/1.7m2    Calcium 8.8 8.5 - 10.1 mg/dL   CBC with platelets   Result Value Ref Range    WBC 7.1 4.0 - 11.0 10e9/L    RBC Count 4.42 3.8 - 5.2 10e12/L    Hemoglobin 12.7 11.7 - 15.7 g/dL    Hematocrit 39.4 35.0 - 47.0 %    MCV 89 78 - 100 fl    MCH 28.7 26.5 - 33.0 pg    MCHC 32.2 31.5 - 36.5 g/dL    RDW 13.6 10.0 - 15.0 %    Platelet Count 239 150 - 450 10e9/L   ECHO COMPLETE WITH OPTISON    Narrative    [id number]  [facility]  [id number]  [id number]^SRINIVAS^ANNA^[facility]  Echocardiography Laboratory  [address] [place] [address]        Name: MIGUEL LACKEY  MRN: [id number]  : 1960  Study Date: 10/14/2018 10:46 AM  Age: 58 yrs  Gender: Female  Patient Location: [facility]  Reason For Study: CAD  Ordering Physician: ANNA ESPINO  Referring Physician: Matilde Christopher  Performed By: Gregoria Figueredo     BSA: 1.8 m2  Height: 66 in  Weight: 151 lb  HR: 51  BP: 132/87 mmHg  _____________________________________________________________________________  __        Procedure  Complete Portable Echo Adult. Contrast Optison.  _____________________________________________________________________________  __         Interpretation Summary     The visual ejection fraction is estimated at 50-55%.  There is severe mid and basal inferolateral and lateral wall hypokinesis.  The study was technically difficult. Contrast was used without apparent  complications.  _____________________________________________________________________________  __        Left Ventricle  The left ventricle is normal in size. There is normal left ventricular wall  thickness. The visual ejection fraction is estimated at 50-55%. Left  ventricular diastolic function is normal. There is severe lateral wall  hypokinesis. No evidence of left ventricular mass or tumors.     Right Ventricle  The right ventricle is normal in structure, function and size.     Atria  Normal left atrial size. Right atrial size is normal.     Mitral Valve  The mitral valve leaflets appear normal. There is no evidence of stenosis,  fluttering, or prolapse.        Tricuspid Valve  Normal tricuspid valve. There is trace tricuspid regurgitation. Right  ventricular systolic pressure is normal.     Aortic Valve  Normal tricuspid aortic valve.     Pulmonic Valve  Normal pulmonic valve.     Vessels  The aortic root is normal size. The inferior vena cava is not dilated.     Pericardium  Trivial posterior pericardial effusion.        Rhythm  Sinus rhythm was noted.  _____________________________________________________________________________  __  MMode/2D Measurements & Calculations  IVSd: 1.1 cm     LVIDd: 4.4 cm  LVIDs: 2.6 cm  LVPWd: 1.1 cm  FS: 41.5 %  LV mass(C)d: 164.9 grams  LV mass(C)dI: 92.9 grams/m2  Ao root diam: 2.9 cm  LA dimension: 3.1 cm  asc Aorta Diam: 2.7 cm  LA/Ao: 1.1  LA Volume (BP): 48.1 ml  LA Volume Index (BP): 27.2 ml/m2  RWT: 0.52           Doppler Measurements & Calculations  MV E max ashanti: 58.7 cm/sec  MV A max ashanti: 51.8 cm/sec  MV E/A: 1.1  MV dec time: 0.21 sec  TR max ashanti: 230.8 cm/sec  TR max P.3 mmHg  E/E' av.1  Lateral E/e': 8.6  Medial E/e': 9.6            _____________________________________________________________________________  __           Report approved by: Hang Méndez 10/14/2018 01:05 PM      US Lower Extremity Arterial rt    Narrative    US LOWER EXTREMITY ARTERIAL RT  10/14/2018 2:28 PM     HISTORY:  Status post femoral access for coronary angiography, with  angioseal. Developed significant tenderness at site. Please evaluate  for hematoma vs. pseudoaneurysm;     COMPARISON: None.    FINDINGS:  Normal blood flow is seen in the right common femoral  artery and in the right superficial femoral artery. The right common  femoral vein and extra normal iliac veins are patent and the common  femoral vein is compressible No pseudoaneurysm is identified.      Impression    IMPRESSION: No hematoma or pseudoaneurysm is identified.    DIONTE PURCELL MD

## 2018-10-15 NOTE — TELEPHONE ENCOUNTER
[facility]ER on 10/14/18 for Nstemi (Non-St Elevated Myocardial Infarction) (H)  No future appointment scheduled, last time she saw Dr. Cotter was back in 2015.    Desire Aguirre

## 2018-10-17 LAB
INTERPRETATION ECG - MUSE: NORMAL
INTERPRETATION ECG - MUSE: NORMAL

## 2018-10-23 ENCOUNTER — HOSPITAL ENCOUNTER (OUTPATIENT)
Dept: CARDIAC REHAB | Facility: CLINIC | Age: 58
End: 2018-10-23
Attending: STUDENT IN AN ORGANIZED HEALTH CARE EDUCATION/TRAINING PROGRAM
Payer: COMMERCIAL

## 2018-10-23 ENCOUNTER — OFFICE VISIT (OUTPATIENT)
Dept: CARDIOLOGY | Facility: CLINIC | Age: 58
End: 2018-10-23
Payer: COMMERCIAL

## 2018-10-23 VITALS
DIASTOLIC BLOOD PRESSURE: 76 MMHG | SYSTOLIC BLOOD PRESSURE: 118 MMHG | WEIGHT: 152 LBS | BODY MASS INDEX: 24.43 KG/M2 | HEIGHT: 66 IN | HEART RATE: 60 BPM

## 2018-10-23 DIAGNOSIS — R00.2 PALPITATIONS: ICD-10-CM

## 2018-10-23 DIAGNOSIS — I21.4 NSTEMI (NON-ST ELEVATED MYOCARDIAL INFARCTION) (H): ICD-10-CM

## 2018-10-23 DIAGNOSIS — I25.42 SPONTANEOUS DISSECTION OF CORONARY ARTERY: Primary | ICD-10-CM

## 2018-10-23 PROCEDURE — 40000116 ZZH STATISTIC OP CR VISIT

## 2018-10-23 PROCEDURE — 40000575 ZZH STATISTIC OP CARDIAC VISIT #2

## 2018-10-23 PROCEDURE — 93798 PHYS/QHP OP CAR RHAB W/ECG: CPT

## 2018-10-23 PROCEDURE — 93797 PHYS/QHP OP CAR RHAB WO ECG: CPT

## 2018-10-23 PROCEDURE — 99214 OFFICE O/P EST MOD 30 MIN: CPT | Performed by: PHYSICIAN ASSISTANT

## 2018-10-23 PROCEDURE — 93000 ELECTROCARDIOGRAM COMPLETE: CPT | Performed by: PHYSICIAN ASSISTANT

## 2018-10-23 NOTE — MR AVS SNAPSHOT
After Visit Summary   10/23/2018    Desire Moise    MRN: [id number]           Patient Information     Date Of Birth          1960        Visit Information        Provider Department      10/23/2018 1:10 PM Silvia Grace PA-C [facility]        Today's Diagnoses     Spontaneous dissection of coronary artery    -  1    Palpitations          Care Instructions    Thank you for your  Heart Care visit today. Your provider has recommended the following:  Medication Changes:  No changes  Recommendations:  Nothing new at this time  Follow-up:  See Dr Zuleta for cardiology follow up on Nov 29th at 8:15.    Reminder:  Please bring in your current medication list or your medication, over the counter supplements and vitamin bottles as we will review these at each office visit.                  Follow-ups after your visit        Your next 10 appointments already scheduled     Oct 25, 2018  9:30 AM CDT   Cardiac Treatment with Rh Cardiac Rehab 2   [facility] ([facility])    [address], [address]  [place] [address]   [phone]            Oct 26, 2018 10:00 AM CDT   Cardiac Treatment with Rh Cardiac Rehab 1   [facility] ([facility])    [address], [address]  [place] [address]   [phone]            Oct 29, 2018  8:00 AM CDT   Cardiac Treatment with Rh Cardiac Rehab 1   [facility] ([facility])    [address], [address]  [place] [place] [address]   [phone]            Oct 31, 2018 10:00 AM CDT   Cardiac Treatment with Rh Cardiac Rehab 1   [facility] ([facility])    [address], [address]  [place] [address]   [phone]            Nov 01, 2018  9:30 AM CDT   Cardiac Treatment with Rh Cardiac Rehab 2   [facility] ([facility])     [address], [address]  [facility] [address]   [phone]            Nov 06, 2018  8:15 AM CST   Cardiac Treatment with Rh Cardiac Rehab 1   [facility] ([facility])    [address], [address]  [facility] [address]   [phone]            Nov 07, 2018 10:00 AM CST   Cardiac Treatment with Rh Cardiac Rehab 1   [facility] ([facility])    [address] [place] [address], [address]  [facility] [address]   [phone]            Nov 09, 2018 10:00 AM CST   Cardiac Treatment with Rh Cardiac Rehab 1   [facility] ([facility])    [address], [address]  [facility] [address]   [phone]            Nov 12, 2018 10:00 AM CST   Cardiac Treatment with Rh Cardiac Rehab 1   [facility] ([facility])    [address] [place] [address], [address]  [facility] [address]   [phone]            Nov 14, 2018  1:00 PM CST   Cardiac Treatment with Rh Cardiac Rehab 1   [facility] ([facility])    [address], [address]  [facility] [address]   [phone]              Who to contact     If you have questions or need follow up information about today's clinic visit or your schedule please contact [facility] directly at [phone].  Normal or non-critical lab and imaging results will be communicated to you by MyChart, letter or phone within 4 business days after the clinic has received the results. If you do not hear from us within 7 days, please contact the clinic through MyChart or phone. If you have a critical or abnormal lab result, we will notify you by phone as soon as possible.  Submit refill requests through [employer] or call your pharmacy and they will forward the refill request to us. Please allow 3 business days for your refill to be completed.          Additional Information About Your  "Visit        [employer] Information     [employer] lets you send messages to your doctor, view your test results, renew your prescriptions, schedule appointments and more. To sign up, go to [email or web address] . Click on \"Log in\" on the left side of the screen, which will take you to the Welcome page. Then click on \"Sign up Now\" on the right side of the page.     You will be asked to enter the access code listed below, as well as some personal information. Please follow the directions to create your username and password.     Your access code is: [email or web address]  Expires: 2019 10:19 AM     Your access code will  in 90 days. If you need help or a new code, please call your [facility] clinic or [phone].        Care EveryWhere ID     This is your Care EveryWhere ID. This could be used by other organizations to access your [facility] medical records  [id number]        Your Vitals Were     Pulse Height Last Period BMI (Body Mass Index)          60 1.676 m (5' 6\") 06/15/2003 24.53 kg/m2         Blood Pressure from Last 3 Encounters:   10/23/18 118/76   10/14/18 125/78   10/13/18 (!) 161/95    Weight from Last 3 Encounters:   10/23/18 68.9 kg (152 lb)   10/14/18 68.5 kg (151 lb 0.2 oz)   10/13/18 69.4 kg (153 lb 1.6 oz)              We Performed the Following     EKG 12-lead complete w/read - Clinics (performed today)        Primary Care Provider Office Phone # Fax #    Matilde Ashwin [phone]       [facility] [address] [place] [address]        Equal Access to Services     CAMACHO ALBERT : Dc Moses, waclarkeda luqadaha, qaaiyanata kaalmada sara, navin mackey. So [facility] [phone].    ATENCIÓN: Si habla español, tiene a manzo disposición servicios gratuitos de asistencia lingüística. Lizbet al [phone].    We comply with applicable federal civil rights laws and [place] laws. We do not discriminate on the basis of race, color, " national origin, age, disability, sex, sexual orientation, or gender identity.            Thank you!     Thank you for choosing [facility]  for your care. Our goal is always to provide you with excellent care. Hearing back from our patients is one way we can continue to improve our services. Please take a few minutes to complete the written survey that you may receive in the mail after your visit with us. Thank you!             Your Updated Medication List - Protect others around you: Learn how to safely use, store and throw away your medicines at www.disposemymeds.org.          This list is accurate as of 10/23/18  2:12 PM.  Always use your most recent med list.                   Brand Name Dispense Instructions for use Diagnosis    acetaminophen 325 MG tablet    TYLENOL    30 tablet    Take 2 tablets (650 mg) by mouth every 4 hours as needed for other (mild pain)    Closed Colles' fracture of right radius, sequela       aspirin 81 MG EC tablet     30 tablet    Take 1 tablet (81 mg) by mouth daily    NSTEMI (non-ST elevated myocardial infarction) (H)       atorvastatin 40 MG tablet    LIPITOR    30 tablet    Take 1 tablet (40 mg) by mouth daily    NSTEMI (non-ST elevated myocardial infarction) (H)       clopidogrel 75 MG tablet    PLAVIX    30 tablet    Take 1 tablet (75 mg) by mouth daily    NSTEMI (non-ST elevated myocardial infarction) (H)       lisinopril 10 MG tablet    PRINIVIL/ZESTRIL    30 tablet    Take 1 tablet (10 mg) by mouth daily    NSTEMI (non-ST elevated myocardial infarction) (H)       metoprolol tartrate 25 MG tablet    LOPRESSOR    60 tablet    Take 1 tablet (25 mg) by mouth 2 times daily    NSTEMI (non-ST elevated myocardial infarction) (H)       nitroGLYcerin 0.4 MG sublingual tablet    NITROSTAT    25 tablet    For chest pain place 1 tablet under the tongue every 5 minutes for 3 doses. If symptoms persist 5 minutes after 1st dose call 911.    NSTEMI  (non-ST elevated myocardial infarction) (H)

## 2018-10-23 NOTE — LETTER
10/23/2018      Matilde Christopher  [facility] [address] [place] [address]      RE: Desire Moise       Dear Colleague,    I had the pleasure of seeing Desire Moise in the [facility].    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      The patient is a very healthy woman without any prior history of hypertension, hyperlipidemia or tobacco use who recently began experiencing substernal chest discomfort which was intermittent.  The day of her presentation, she developed more sustained left-sided chest discomfort that also radiated into her left arm.  She presented to [facility] where an EKG showed sinus rhythm with nonspecific ST-T wave changes.  Her troponin was mildly elevated.  Due to ongoing symptomatology, she was sent to [facility] for urgent coronary angiography.  This revealed no significant atherosclerotic disease, but she was noted to have an abnormal appearance of the OM1 branch which tapered in size rapidly.  However, there was EMMIE 3 flow down the vessel.  Her left ventriculogram showed mild hypokinesis of the basal anterolateral wall with mildly reduced LV systolic function.  It was felt that these findings were most consistent with a spontaneous coronary artery dissection.  She was started on appropriate medical therapy including aspirin, clopidogrel, and metoprolol.  She was also started on atorvastatin 40 mg daily and lisinopril during her stay.  She did undergo an echocardiogram on 10/14.  This showed low normal LV systolic function with an EF of 50%-55%.  There was severe mid and basal inferolateral wall and lateral wall hypokinesis.  No significant valvular  heart disease was noted.  No other significant findings were noted.  During her stay, she did develop significant tenderness at her right femoral arterial access site.  A lower extremity arterial ultrasound was performed which did not show any evidence of pseudoaneurysm or hematoma.      Since leaving the hospital, the patient has not had any recurrent chest or left arm discomfort.  She has noted a very mild amount of dyspnea with exertion which does seem to be improving.  She also states that at 3:00 a.m. this morning she woke up with a discomfort in her right upper thigh.  When she woke up for the day, later on in the morning, she felt that there may be some mild swelling in the upper thigh and knee area and also in her left hand.  These symptoms have resolved throughout the day.  Also, starting this morning at about 8:00 a.m. she has been noticing intermittent palpitations.  She has some lightheadedness associated with this, but does not have again any symptoms that were similar to what she experienced prior to her recent presentation.  She participated in intake session of cardiac rehab earlier today and states that this went well.  She did not have any symptoms or limitations with this.  She has had quite a stressful couple of weeks with her above-mentioned admission and diagnosis.  Additionally, just 4 days ago her 30-year-old son-in-law suffered an acute carotid dissection which initially resulted in complete left-sided paralysis and aphasia.  Fortunately, he was able to get immediate treatment and his symptoms essentially completely resolved.  The patient has been very involved in both his care as well as the care of his 7-week-old baby.      CURRENT CARDIAC MEDICATIONS:   1.  Aspirin 81 mg daily.   2.  Atorvastatin 40 mg daily.   3.  Clopidogrel 75 mg daily.   4.  Lisinopril 10 mg daily.   5.  Metoprolol tartrate 25 mg b.i.d.   6.  Sublingual nitro p.r.n.  The patient has not picked up this prescription  yet, but will be in the near future.      The remainder of her medications, allergies and review of systems were reviewed and as are documented separately.      PHYSICAL EXAMINATION:   GENERAL:  The patient is a pleasant 58-year-old female who appears her stated age.  She is in no apparent distress.   VITAL SIGNS:  Her blood pressure is 118/76, pulse is 60, weight is 152 pounds.     PULMONARY:  Breathing is nonlabored.  Lungs are clear to auscultation.   CARDIAC:  Reveals a regular rate and rhythm.  I do notice occasional ectopic beat.  I do not appreciate any murmur.   EXTREMITIES:  Lower extremities show no evidence of edema.   NEUROLOGIC:  Alert and oriented.      Two EKGs were performed during today's office visit.  These show sinus rhythm.  She does have T-wave inversion in leads I and aVL, and T-wave flattening in V6.  This is overall unchanged from before.  No PACs or PVCs were noted.      ASSESSMENT AND PLAN:  Desire is a pleasant 58-year-old female who recently presented with chest discomfort and was found to have a non-STEMI.  She underwent coronary angiography which was consistent with a coronary artery dissection in the first obtuse marginal branch.  Echocardiography showed low normal LV systolic function with inferolateral and lateral wall hypokinesis.  She has been started on appropriate medical therapy.  She has not had any recurrent anginal symptoms.  I did see during her hospitalization that they recommended she have an outpatient vascular assessment to see if she had any evidence of aneurysms or FMD.  I did review her chart and back in 2015 she had an MRA of the head and neck which did not show any vascular abnormalities.  She also had a CTA of the chest, abdomen and pelvis which did not show any vascular abnormalities.      In regard to the palpitations that she has been noticing today, again we did not capture any rhythm abnormalities on the 2 EKGs which were performed in the office.  Her symptoms  seem most consistent with PACs or PVCs.  I think it is likely that this is stress-induced and probably from some lack of sleep over the past week or so.  At this time, I have asked her to try hydrating and resting.  If her symptoms continue later on today and she remains quite symptomatic I have given her the okay to take an extra dose of metoprolol.      In regard to the right lower extremity discomfort, on examination I do not see any particular abnormalities.  She did have an ultrasound after her procedure which did not show any hematoma or evidence of pseudoaneurysm.  I have asked her to keep an eye on the symptoms as they do seem to be improving throughout the day.  If she continues to have symptoms or if she has progressive symptoms, I have asked her to call the office and we will want to consider a repeat ultrasound.      She will continue to participate in cardiac rehabilitation.  I did discuss with her trying to keep her exertional level to a maximum of moderate over the next couple of months.  I suggested that she keep a lifting restriction of about 20-30 pounds and that she ideally keep her heart rate less than the 130 range.      She will follow up with Dr. Zuleta as previously arranged in late November.  Of course, I encouraged her to contact me sooner with any questions or concerns.         DANIEL LEE PA-C             D: 10/23/2018   T: 10/23/2018   MT: STELLA      Name:     MIGUEL LACKEY   MRN:      [id number]        Account:      [id number]   :      1960           Service Date: 10/23/2018      Document: [id number]           Outpatient Encounter Prescriptions as of 10/23/2018   Medication Sig Dispense Refill     acetaminophen (TYLENOL) 325 MG tablet Take 2 tablets (650 mg) by mouth every 4 hours as needed for other (mild pain) 30 tablet 0     aspirin 81 MG EC tablet Take 1 tablet (81 mg) by mouth daily 30 tablet 1     clopidogrel (PLAVIX) 75 MG tablet Take 1 tablet (75 mg) by  mouth daily 30 tablet 3     lisinopril (PRINIVIL/ZESTRIL) 10 MG tablet Take 1 tablet (10 mg) by mouth daily 30 tablet 3     metoprolol tartrate (LOPRESSOR) 25 MG tablet Take 1 tablet (25 mg) by mouth 2 times daily 60 tablet 3     [DISCONTINUED] atorvastatin (LIPITOR) 40 MG tablet Take 1 tablet (40 mg) by mouth daily 30 tablet 3     nitroGLYcerin (NITROSTAT) 0.4 MG sublingual tablet For chest pain place 1 tablet under the tongue every 5 minutes for 3 doses. If symptoms persist 5 minutes after 1st dose call 911. (Patient not taking: Reported on 10/23/2018) 25 tablet 0     No facility-administered encounter medications on file as of 10/23/2018.        Again, thank you for allowing me to participate in the care of your patient.      Sincerely,    Silvia Grace PA-C     [facility]

## 2018-10-23 NOTE — PROGRESS NOTES
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      The patient is a very healthy woman without any prior history of hypertension, hyperlipidemia or tobacco use who recently began experiencing substernal chest discomfort which was intermittent.  The day of her presentation, she developed more sustained left-sided chest discomfort that also radiated into her left arm.  She presented to [facility] where an EKG showed sinus rhythm with nonspecific ST-T wave changes.  Her troponin was mildly elevated.  Due to ongoing symptomatology, she was sent to [facility] for urgent coronary angiography.  This revealed no significant atherosclerotic disease, but she was noted to have an abnormal appearance of the OM1 branch which tapered in size rapidly.  However, there was EMMIE 3 flow down the vessel.  Her left ventriculogram showed mild hypokinesis of the basal anterolateral wall with mildly reduced LV systolic function.  It was felt that these findings were most consistent with a spontaneous coronary artery dissection.  She was started on appropriate medical therapy including aspirin, clopidogrel, and metoprolol.  She was also started on atorvastatin 40 mg daily and lisinopril during her stay.  She did undergo an echocardiogram on 10/14.  This showed low normal LV systolic function with an EF of 50%-55%.  There was severe mid and basal inferolateral wall and lateral wall hypokinesis.  No significant valvular heart disease was noted.  No other significant findings were noted.  During her stay, she did develop significant tenderness at her right femoral arterial access site.  A lower extremity arterial ultrasound was performed which did not show any  evidence of pseudoaneurysm or hematoma.      Since leaving the hospital, the patient has not had any recurrent chest or left arm discomfort.  She has noted a very mild amount of dyspnea with exertion which does seem to be improving.  She also states that at 3:00 a.m. this morning she woke up with a discomfort in her right upper thigh.  When she woke up for the day, later on in the morning, she felt that there may be some mild swelling in the upper thigh and knee area and also in her left hand.  These symptoms have resolved throughout the day.  Also, starting this morning at about 8:00 a.m. she has been noticing intermittent palpitations.  She has some lightheadedness associated with this, but does not have again any symptoms that were similar to what she experienced prior to her recent presentation.  She participated in intake session of cardiac rehab earlier today and states that this went well.  She did not have any symptoms or limitations with this.  She has had quite a stressful couple of weeks with her above-mentioned admission and diagnosis.  Additionally, just 4 days ago her 30-year-old son-in-law suffered an acute carotid dissection which initially resulted in complete left-sided paralysis and aphasia.  Fortunately, he was able to get immediate treatment and his symptoms essentially completely resolved.  The patient has been very involved in both his care as well as the care of his 7-week-old baby.      CURRENT CARDIAC MEDICATIONS:   1.  Aspirin 81 mg daily.   2.  Atorvastatin 40 mg daily.   3.  Clopidogrel 75 mg daily.   4.  Lisinopril 10 mg daily.   5.  Metoprolol tartrate 25 mg b.i.d.   6.  Sublingual nitro p.r.n.  The patient has not picked up this prescription yet, but will be in the near future.      The remainder of her medications, allergies and review of systems were reviewed and as are documented separately.      PHYSICAL EXAMINATION:   GENERAL:  The patient is a pleasant 58-year-old female who  appears her stated age.  She is in no apparent distress.   VITAL SIGNS:  Her blood pressure is 118/76, pulse is 60, weight is 152 pounds.     PULMONARY:  Breathing is nonlabored.  Lungs are clear to auscultation.   CARDIAC:  Reveals a regular rate and rhythm.  I do notice occasional ectopic beat.  I do not appreciate any murmur.   EXTREMITIES:  Lower extremities show no evidence of edema.   NEUROLOGIC:  Alert and oriented.      Two EKGs were performed during today's office visit.  These show sinus rhythm.  She does have T-wave inversion in leads I and aVL, and T-wave flattening in V6.  This is overall unchanged from before.  No PACs or PVCs were noted.      ASSESSMENT AND PLAN:  Dseire is a pleasant 58-year-old female who recently presented with chest discomfort and was found to have a non-STEMI.  She underwent coronary angiography which was consistent with a coronary artery dissection in the first obtuse marginal branch.  Echocardiography showed low normal LV systolic function with inferolateral and lateral wall hypokinesis.  She has been started on appropriate medical therapy.  She has not had any recurrent anginal symptoms.  I did see during her hospitalization that they recommended she have an outpatient vascular assessment to see if she had any evidence of aneurysms or FMD.  I did review her chart and back in 2015 she had an MRA of the head and neck which did not show any vascular abnormalities.  She also had a CTA of the chest, abdomen and pelvis which did not show any vascular abnormalities.      In regard to the palpitations that she has been noticing today, again we did not capture any rhythm abnormalities on the 2 EKGs which were performed in the office.  Her symptoms seem most consistent with PACs or PVCs.  I think it is likely that this is stress-induced and probably from some lack of sleep over the past week or so.  At this time, I have asked her to try hydrating and resting.  If her symptoms continue  later on today and she remains quite symptomatic I have given her the okay to take an extra dose of metoprolol.      In regard to the right lower extremity discomfort, on examination I do not see any particular abnormalities.  She did have an ultrasound after her procedure which did not show any hematoma or evidence of pseudoaneurysm.  I have asked her to keep an eye on the symptoms as they do seem to be improving throughout the day.  If she continues to have symptoms or if she has progressive symptoms, I have asked her to call the office and we will want to consider a repeat ultrasound.      She will continue to participate in cardiac rehabilitation.  I did discuss with her trying to keep her exertional level to a maximum of moderate over the next couple of months.  I suggested that she keep a lifting restriction of about 20-30 pounds and that she ideally keep her heart rate less than the 130 range.      She will follow up with Dr. Zuleta as previously arranged in late November.  Of course, I encouraged her to contact me sooner with any questions or concerns.         DANIEL LEE PA-C             D: 10/23/2018   T: 10/23/2018   MT: STELLA      Name:     MIGUEL LACKEY   MRN:      [id number]        Account:      [id number]   :      1960           Service Date: 10/23/2018      Document: [id number]

## 2018-10-23 NOTE — PATIENT INSTRUCTIONS
Thank you for your [facility] visit today. Your provider has recommended the following:  Medication Changes:  No changes  Recommendations:  Nothing new at this time  Follow-up:  See Dr Zuleta for cardiology follow up on Nov 29th at 8:15.    Reminder:  Please bring in your current medication list or your medication, over the counter supplements and vitamin bottles as we will review these at each office visit.

## 2018-10-23 NOTE — PROGRESS NOTES
Please see separate dictation for HPI, PHYSICAL EXAM AND IMPRESSION/PLAN.    CURRENT MEDICATIONS:  Current Outpatient Prescriptions   Medication Sig Dispense Refill     acetaminophen (TYLENOL) 325 MG tablet Take 2 tablets (650 mg) by mouth every 4 hours as needed for other (mild pain) 30 tablet 0     aspirin 81 MG EC tablet Take 1 tablet (81 mg) by mouth daily 30 tablet 1     atorvastatin (LIPITOR) 40 MG tablet Take 1 tablet (40 mg) by mouth daily 30 tablet 3     clopidogrel (PLAVIX) 75 MG tablet Take 1 tablet (75 mg) by mouth daily 30 tablet 3     lisinopril (PRINIVIL/ZESTRIL) 10 MG tablet Take 1 tablet (10 mg) by mouth daily 30 tablet 3     metoprolol tartrate (LOPRESSOR) 25 MG tablet Take 1 tablet (25 mg) by mouth 2 times daily 60 tablet 3     nitroGLYcerin (NITROSTAT) 0.4 MG sublingual tablet For chest pain place 1 tablet under the tongue every 5 minutes for 3 doses. If symptoms persist 5 minutes after 1st dose call 911. (Patient not taking: Reported on 10/23/2018) 25 tablet 0       ALLERGIES:   No Known Allergies    PAST MEDICAL HISTORY:  Past Medical History:   Diagnosis Date     Diverticulosis        PAST SURGICAL HISTORY:  Past Surgical History:   Procedure Laterality Date     GYN SURGERY       HYSTERECTOMY       INCISION OF HYMEN  1980     OPEN REDUCTION INTERNAL FIXATION WRIST Right 9/11/2017    Procedure: OPEN REDUCTION INTERNAL FIXATION WRIST;  Open reduction and internal fixation right intra-articular displaced distal radius fracture;  Surgeon: Santi Villarreal MD;  Location: [facility] OR     REMOVE HARDWARE UPPER EXTREMITY Right 2/2/2018    Procedure: REMOVE HARDWARE UPPER EXTREMITY;  Removal of deep implant, right distal radius;  Surgeon: Santi Villarreal MD;  Location: [facility] OR     [place] teeth surgery  1980       SOCIAL HISTORY:  Social History     Social History     Marital status:      Spouse name: John     Number of children: 3     Years of education: 15     Occupational History     Teacher [employer]     Social History Main Topics     Smoking status: Never Smoker     Smokeless tobacco: Never Used     Alcohol use No     Drug use: No     Sexual activity: Yes     Partners: Male     Birth control/ protection: Condom     Other Topics Concern     Exercise Yes     Seat Belt Yes     Self-Exams Yes     Social History Narrative       FAMILY HISTORY:  Family History   Problem Relation Age of Onset     HEART DISEASE Father      cab     Lipids Father      Cancer Maternal Grandmother      cancer  Breast       Review of Systems:  Skin:  Negative for       Eyes:  Positive for glasses    ENT:  Positive for   some blood when blowing nose  Respiratory:  Positive for shortness of breath;dyspnea on exertion     Cardiovascular:    palpitations;edema;lightheadedness;fatigue;Positive for    Gastroenterology: Negative for      Genitourinary:  not assessed      Musculoskeletal:  Negative for      Neurologic:  Negative for      Psychiatric:  Negative for      Heme/Lymph/Imm:  Negative      Endocrine:  Negative         Reviewed. Remainder of the note dictated.    Silvia Grace PA-C

## 2018-10-23 NOTE — LETTER
10/23/2018    [address]  [facility] [address]   [place] [address]    RE: Desire Moise       Dear Colleague,    I had the pleasure of seeing Desire Moise in the [facility].    Please see separate dictation for HPI, PHYSICAL EXAM AND IMPRESSION/PLAN.    CURRENT MEDICATIONS:  Current Outpatient Prescriptions   Medication Sig Dispense Refill     acetaminophen (TYLENOL) 325 MG tablet Take 2 tablets (650 mg) by mouth every 4 hours as needed for other (mild pain) 30 tablet 0     aspirin 81 MG EC tablet Take 1 tablet (81 mg) by mouth daily 30 tablet 1     atorvastatin (LIPITOR) 40 MG tablet Take 1 tablet (40 mg) by mouth daily 30 tablet 3     clopidogrel (PLAVIX) 75 MG tablet Take 1 tablet (75 mg) by mouth daily 30 tablet 3     lisinopril (PRINIVIL/ZESTRIL) 10 MG tablet Take 1 tablet (10 mg) by mouth daily 30 tablet 3     metoprolol tartrate (LOPRESSOR) 25 MG tablet Take 1 tablet (25 mg) by mouth 2 times daily 60 tablet 3     nitroGLYcerin (NITROSTAT) 0.4 MG sublingual tablet For chest pain place 1 tablet under the tongue every 5 minutes for 3 doses. If symptoms persist 5 minutes after 1st dose call 911. (Patient not taking: Reported on 10/23/2018) 25 tablet 0       ALLERGIES:   No Known Allergies    PAST MEDICAL HISTORY:  Past Medical History:   Diagnosis Date     Diverticulosis        PAST SURGICAL HISTORY:  Past Surgical History:   Procedure Laterality Date     GYN SURGERY       HYSTERECTOMY       INCISION OF HYMEN  1980     OPEN REDUCTION INTERNAL FIXATION WRIST Right 9/11/2017    Procedure: OPEN REDUCTION INTERNAL FIXATION WRIST;  Open reduction and internal fixation right intra-articular displaced distal radius fracture;  Surgeon: Santi Villarreal MD;  Location: [facility] OR     REMOVE HARDWARE UPPER EXTREMITY Right 2/2/2018    Procedure: REMOVE HARDWARE UPPER EXTREMITY;  Removal of deep implant, right distal radius;  Surgeon: Santi Villarreal MD;   Location: [facility] OR     [place] teeth surgery  1980       SOCIAL HISTORY:  Social History     Social History     Marital status:      Spouse name: John     Number of children: 3     Years of education: 15     Occupational History     Teacher [employer]     Social History Main Topics     Smoking status: Never Smoker     Smokeless tobacco: Never Used     Alcohol use No     Drug use: No     Sexual activity: Yes     Partners: Male     Birth control/ protection: Condom     Other Topics Concern     Exercise Yes     Seat Belt Yes     Self-Exams Yes     Social History Narrative       FAMILY HISTORY:  Family History   Problem Relation Age of Onset     HEART DISEASE Father      cab     Lipids Father      Cancer Maternal Grandmother      cancer  Breast       Review of Systems:  Skin:  Negative for       Eyes:  Positive for glasses    ENT:  Positive for   some blood when blowing nose  Respiratory:  Positive for shortness of breath;dyspnea on exertion     Cardiovascular:    palpitations;edema;lightheadedness;fatigue;Positive for    Gastroenterology: Negative for      Genitourinary:  not assessed      Musculoskeletal:  Negative for      Neurologic:  Negative for      Psychiatric:  Negative for      Heme/Lymph/Imm:  Negative      Endocrine:  Negative         Reviewed. Remainder of the note dictated.    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      The patient is a very healthy woman without any prior history of hypertension, hyperlipidemia or tobacco use who recently began experiencing substernal chest discomfort which was intermittent.  The day of her presentation, she developed more sustained left-sided chest discomfort  that also radiated into her left arm.  She presented to [facility] where an EKG showed sinus rhythm with nonspecific ST-T wave changes.  Her troponin was mildly elevated.  Due to ongoing symptomatology, she was sent to [facility] for urgent coronary angiography.  This revealed no significant atherosclerotic disease, but she was noted to have an abnormal appearance of the OM1 branch which tapered in size rapidly.  However, there was EMMIE 3 flow down the vessel.  Her left ventriculogram showed mild hypokinesis of the basal anterolateral wall with mildly reduced LV systolic function.  It was felt that these findings were most consistent with a spontaneous coronary artery dissection.  She was started on appropriate medical therapy including aspirin, clopidogrel, and metoprolol.  She was also started on atorvastatin 40 mg daily and lisinopril during her stay.  She did undergo an echocardiogram on 10/14.  This showed low normal LV systolic function with an EF of 50%-55%.  There was severe mid and basal inferolateral wall and lateral wall hypokinesis.  No significant valvular heart disease was noted.  No other significant findings were noted.  During her stay, she did develop significant tenderness at her right femoral arterial access site.  A lower extremity arterial ultrasound was performed which did not show any evidence of pseudoaneurysm or hematoma.      Since leaving the hospital, the patient has not had any recurrent chest or left arm discomfort.  She has noted a very mild amount of dyspnea with exertion which does seem to be improving.  She also states that at 3:00 a.m. this morning she woke up with a discomfort in her right upper thigh.  When she woke up for the day, later on in the morning, she felt that there may be some mild swelling in the upper thigh and knee area and also in her left hand.  These symptoms have resolved throughout the day.  Also, starting this morning at about 8:00 a.m. she has  been noticing intermittent palpitations.  She has some lightheadedness associated with this, but does not have again any symptoms that were similar to what she experienced prior to her recent presentation.  She participated in intake session of cardiac rehab earlier today and states that this went well.  She did not have any symptoms or limitations with this.  She has had quite a stressful couple of weeks with her above-mentioned admission and diagnosis.  Additionally, just 4 days ago her 30-year-old son-in-law suffered an acute carotid dissection which initially resulted in complete left-sided paralysis and aphasia.  Fortunately, he was able to get immediate treatment and his symptoms essentially completely resolved.  The patient has been very involved in both his care as well as the care of his 7-week-old baby.      CURRENT CARDIAC MEDICATIONS:   1.  Aspirin 81 mg daily.   2.  Atorvastatin 40 mg daily.   3.  Clopidogrel 75 mg daily.   4.  Lisinopril 10 mg daily.   5.  Metoprolol tartrate 25 mg b.i.d.   6.  Sublingual nitro p.r.n.  The patient has not picked up this prescription yet, but will be in the near future.      The remainder of her medications, allergies and review of systems were reviewed and as are documented separately.      PHYSICAL EXAMINATION:   GENERAL:  The patient is a pleasant 58-year-old female who appears her stated age.  She is in no apparent distress.   VITAL SIGNS:  Her blood pressure is 118/76, pulse is 60, weight is 152 pounds.     PULMONARY:  Breathing is nonlabored.  Lungs are clear to auscultation.   CARDIAC:  Reveals a regular rate and rhythm.  I do notice occasional ectopic beat.  I do not appreciate any murmur.   EXTREMITIES:  Lower extremities show no evidence of edema.   NEUROLOGIC:  Alert and oriented.      Two EKGs were performed during today's office visit.  These show sinus rhythm.  She does have T-wave inversion in leads I and aVL, and T-wave flattening in V6.  This is overall  unchanged from before.  No PACs or PVCs were noted.      ASSESSMENT AND PLAN:  Desire is a pleasant 58-year-old female who recently presented with chest discomfort and was found to have a non-STEMI.  She underwent coronary angiography which was consistent with a coronary artery dissection in the first obtuse marginal branch.  Echocardiography showed low normal LV systolic function with inferolateral and lateral wall hypokinesis.  She has been started on appropriate medical therapy.  She has not had any recurrent anginal symptoms.  I did see during her hospitalization that they recommended she have an outpatient vascular assessment to see if she had any evidence of aneurysms or FMD.  I did review her chart and back in 2015 she had an MRA of the head and neck which did not show any vascular abnormalities.  She also had a CTA of the chest, abdomen and pelvis which did not show any vascular abnormalities.      In regard to the palpitations that she has been noticing today, again we did not capture any rhythm abnormalities on the 2 EKGs which were performed in the office.  Her symptoms seem most consistent with PACs or PVCs.  I think it is likely that this is stress-induced and probably from some lack of sleep over the past week or so.  At this time, I have asked her to try hydrating and resting.  If her symptoms continue later on today and she remains quite symptomatic I have given her the okay to take an extra dose of metoprolol.      In regard to the right lower extremity discomfort, on examination I do not see any particular abnormalities.  She did have an ultrasound after her procedure which did not show any hematoma or evidence of pseudoaneurysm.  I have asked her to keep an eye on the symptoms as they do seem to be improving throughout the day.  If she continues to have symptoms or if she has progressive symptoms, I have asked her to call the office and we will want to consider a repeat ultrasound.      She will  continue to participate in cardiac rehabilitation.  I did discuss with her trying to keep her exertional level to a maximum of moderate over the next couple of months.  I suggested that she keep a lifting restriction of about 20-30 pounds and that she ideally keep her heart rate less than the 130 range.      She will follow up with Dr. Zuleta as previously arranged in late November.  Of course, I encouraged her to contact me sooner with any questions or concerns.         DANIEL LEE PA-C             D: 10/23/2018   T: 10/23/2018   MT: STELLA      Name:     MIGUEL LACKEY   MRN:      [id number]        Account:      [id number]   :      1960           Service Date: 10/23/2018      Document: [id number]        Thank you for allowing me to participate in the care of your patient.      Sincerely,     Daniel Lee PA-C     [facility]    cc:   No referring provider defined for this encounter.

## 2018-10-25 ENCOUNTER — HOSPITAL ENCOUNTER (OUTPATIENT)
Dept: CARDIAC REHAB | Facility: CLINIC | Age: 58
End: 2018-10-25
Attending: STUDENT IN AN ORGANIZED HEALTH CARE EDUCATION/TRAINING PROGRAM
Payer: COMMERCIAL

## 2018-10-25 PROCEDURE — 40000116 ZZH STATISTIC OP CR VISIT: Performed by: OCCUPATIONAL THERAPIST

## 2018-10-25 PROCEDURE — 93798 PHYS/QHP OP CAR RHAB W/ECG: CPT | Performed by: OCCUPATIONAL THERAPIST

## 2018-10-26 ENCOUNTER — APPOINTMENT (OUTPATIENT)
Dept: ULTRASOUND IMAGING | Facility: CLINIC | Age: 58
End: 2018-10-26
Attending: EMERGENCY MEDICINE
Payer: COMMERCIAL

## 2018-10-26 ENCOUNTER — TELEPHONE (OUTPATIENT)
Dept: CARDIOLOGY | Facility: CLINIC | Age: 58
End: 2018-10-26

## 2018-10-26 ENCOUNTER — HOSPITAL ENCOUNTER (EMERGENCY)
Facility: CLINIC | Age: 58
Discharge: HOME | End: 2018-10-26
Attending: EMERGENCY MEDICINE | Admitting: EMERGENCY MEDICINE
Payer: COMMERCIAL

## 2018-10-26 VITALS
BODY MASS INDEX: 24.27 KG/M2 | DIASTOLIC BLOOD PRESSURE: 100 MMHG | RESPIRATION RATE: 20 BRPM | HEIGHT: 66 IN | WEIGHT: 151 LBS | OXYGEN SATURATION: 95 % | HEART RATE: 62 BPM | TEMPERATURE: 96.8 F | SYSTOLIC BLOOD PRESSURE: 163 MMHG

## 2018-10-26 DIAGNOSIS — Z98.890 S/P CORONARY ANGIOGRAM: ICD-10-CM

## 2018-10-26 DIAGNOSIS — M79.651 PAIN OF RIGHT THIGH: ICD-10-CM

## 2018-10-26 PROCEDURE — 99285 EMERGENCY DEPT VISIT HI MDM: CPT | Mod: 25

## 2018-10-26 PROCEDURE — 93971 EXTREMITY STUDY: CPT | Mod: RT

## 2018-10-26 PROCEDURE — 76882 US LMTD JT/FCL EVL NVASC XTR: CPT | Mod: RT

## 2018-10-26 PROCEDURE — 25000132 ZZH RX MED GY IP 250 OP 250 PS 637: Performed by: EMERGENCY MEDICINE

## 2018-10-26 PROCEDURE — 93926 LOWER EXTREMITY STUDY: CPT | Mod: RT

## 2018-10-26 RX ORDER — ACETAMINOPHEN 500 MG
1000 TABLET ORAL ONCE
Status: COMPLETED | OUTPATIENT
Start: 2018-10-26 | End: 2018-10-26

## 2018-10-26 RX ADMIN — ACETAMINOPHEN 500 MG: 500 TABLET, FILM COATED ORAL at 04:58

## 2018-10-26 ASSESSMENT — ENCOUNTER SYMPTOMS
COLOR CHANGE: 0
BRUISES/BLEEDS EASILY: 1
ARTHRALGIAS: 1
FEVER: 0
MYALGIAS: 1

## 2018-10-26 NOTE — DISCHARGE INSTRUCTIONS
Myalgias  Myalgias are another word for muscle aches and soreness. This is a symptom, not a disease. Myalgias can have many causes. A cold, the flu, or an acute infection can cause them. So can any illness with a high fever. They may happen after exertion (such as heavy exercise) or injury (such as an accident or fall). Some medicines (such as statins and certain antidepressants) can cause myalgias. They can also be a symptom of chronic or ongoing medical problems (such as lupus, chronic fatigue, or hypothyroidism). With these illnesses, other serious symptoms often occur in addition to muscle pain and soreness.    Myalgias most often go away on their own. If they don't go away, come back, or are severe, testing may be needed to help find the cause.  Home care    Rest until you feel better.    Follow instructions that you were given for how to care for yourself. This may depend on the cause of your myalgias.     If myalgia is thought to be due to a medicine, be sure to talk to the doctor that prescribed the medicine about the best course of action.    To control pain, take prescription or over-the-counter medicines as directed. Unless told not to, you can try acetaminophen or ibuprofen.  Follow-up care  Follow up with your healthcare provider or as advised. If your symptoms do not go away in a few days or if they come back, follow up with your healthcare provider for an exam and testing.  When to see medical advice  Call your healthcare provider for any of the following:    Fever of 100.4 F (38 C) or higher, or as directed by your healthcare provider    Pain that gets worse and not better, or that goes away and comes back    New joint pains    New rash    Severe headache, neck pain, drowsiness, or confusion  Date Last Reviewed: 3/1/2017    [id number] [employer]. [address] [facility], [place] [address]. All rights reserved. This information is not intended as a substitute for professional medical  care. Always follow your healthcare professional's instructions.

## 2018-10-26 NOTE — ED AVS SNAPSHOT
[facility] Emergency Department    [address]    [facility] [address]    Phone:  [phone]    Fax:  [phone]                                       Desire Moise   MRN: [id number]    Department:  [facility] Emergency Department   Date of Visit:  10/26/2018           After Visit Summary Signature Page     I have received my discharge instructions, and my questions have been answered. I have discussed any challenges I see with this plan with the nurse or doctor.    ..........................................................................................................................................  Patient/Patient Representative Signature      ..........................................................................................................................................  Patient Representative Print Name and Relationship to Patient    ..................................................               ................................................  Date                                   Time    ..........................................................................................................................................  Reviewed by Signature/Title    ...................................................              ..............................................  Date                                               Time          22EPIC Rev 08/18

## 2018-10-26 NOTE — ED PROVIDER NOTES
History     Chief Complaint:  Leg Pain    HPI   Desire Moise is a 58 year old female with a history of recent SCAD NSTEMI on 10/13 s/p angiogram on 10/13 and currently on Plavix and aspirin who presents to the emergency department today for evaluation of right leg pain. The patient reports she has been doing well since her surgery with pain only a couple of days after the surgery. Recently, she had an onset of constant right leg pain medial thigh for the past three days. She has been unable to sleep due to the pain. She notes that her right leg is subjectively more swollen than baseline. She was concerned about the persistent leg pain prompting her visit to the emergency department.  She denies known injury or trauma to the area, right groin pain, fever, and redness to the area.     Allergies:  No Known Drug Allergies    Medications:    aspirin 81 MG EC tablet  atorvastatin (LIPITOR) 40 MG tablet  clopidogrel (PLAVIX) 75 MG tablet  lisinopril (PRINIVIL/ZESTRIL) 10 MG tablet  metoprolol tartrate (LOPRESSOR) 25 MG tablet  nitroGLYcerin (NITROSTAT) 0.4 MG sublingual tablet    Past Medical History:    Diverticulosis  NSTEMI  Anemia  ACS    Past Surgical History:    GYN surgery  Hysterectomy  Incision of hymen  Open reduction internal fixation wrist  Remove hardware upper extremity  [place] teeth surgery    Family History:    Heart disease  Lipids    Social History:  The patient was accompanied to the ED by .  Smoking Status: Never  Smokeless Tobacco: Never  Alcohol Use: No  Marital Status:       Review of Systems   Constitutional: Negative for fever.   Musculoskeletal: Positive for arthralgias and myalgias.   Skin: Negative for color change.   Hematological: Bruises/bleeds easily.   All other systems reviewed and are negative.    Physical Exam     Patient Vitals for the past 24 hrs:   BP Temp Temp src Pulse Resp SpO2 Height Weight   10/26/18 0333 (!) 163/100 96.8  F (36  C) Temporal 62 20 95 % 1.676 m  "(5' 6\") 68.5 kg (151 lb)         Physical Exam  General: Patient is alert and interactive when I enter the room  Head:  The scalp, face, and head appear normal  Eyes:  The pupils are equal, round, and reactive to light    Conjunctivae and sclerae are normal  ENT:    External acoustic canals are normal    The oropharynx is normal without erythema.     Uvula is in the midline  Neck:  Normal range of motion  CV:  Regular rate. S1/S2. No murmurs.   Resp:  Lungs are clear without wheezes or rales. No distress  GI:  Abdomen is soft, no rigidity, guarding, or rebound    No distension. No tenderness to palpation in any quadrant.     MS:  Normal tone. Joints grossly normal without effusions.     No asymmetric leg swelling, calf or thigh tenderness.      Normal motor assessment of all extremities.    Full area in the right femoral artery. No pain to the right groin. No pain with rotation of the hip. No warmth or redness to the right groin.     Tender to palpation right adductor compartment.   Skin:  No rash or lesions noted. No redness or swelling over medial thigh.  Normal capillary refill noted  Neuro: Speech is normal and fluent. Face is symmetric.     Moving all extremities well.   Psych:  Awake. Alert.  Normal affect.  Appropriate interactions.  Lymph: No anterior cervical lymphadenopathy noted    Emergency Department Course   Imaging:  Radiology findings were communicated with the patient and family who voiced understanding of the findings.  US Lower Extremity Venous Duplex Right  IMPRESSION: No DVT.  Report per radiology     US Lower Extremity Arterial right  No pseudoaneurysm or hematoma. Small inguinal lymph node.   Report per radiology     Procedures:  POC US SOFT TISSUE   Final Result   Procedure Note       Emergency Medicine Limited Ultrasound: Abscess      PERFORMED BY: Guille Foreman MD   INDICATIONS/SYMPTOM:  Soft tissue pain Evaluate for fluid collection   PROBE: Linear probe   BODY LOCATION: The ultrasound " was performed medial right thigh.   FINDINGS:  No fluid collection was demonstrated in the soft tissues.There was no edema noted in the soft tissues.     INTERPRETATION: No Abscess identified, no edema in soft tissues. No hematoma noted.    IMAGE DOCUMENTATION: Images were archived to [facility]         US Lower Extremity Venous Duplex Right   Preliminary Result   IMPRESSION: No DVT.      US Lower Extremity Arterial rt    (Results Pending)         Interventions:  0458 Tylenol 500 mg PO    Emergency Department Course:  Nursing notes and vitals reviewed.  The patient was sent for a US Lower Extremity Venous Duplex Right and US Lower Extremity Arterial right while in the emergency department, results above.   0341: I performed an exam of the patient as documented above.   0455: I performed a POC US soft tissue as noted above.   Findings and plan explained to the Patient and spouse. Patient discharged home with instructions regarding supportive care, medications, and reasons to return. The importance of close follow-up was reviewed.  I personally reviewed the imaging results with the Patient and spouse and answered all related questions prior to discharge.    Impression & Plan    Medical Decision Makin yo female with leg pain, leg swelling at home not found on ED exam today. Angiogram a few weeks ago but pain was not present then or in more recent recovery until a few days ago.   Broad ddx considered.   No signs of pseudoaneurysm, DVT, hematoma in adductor compartment.   Small bakers cyst is not in area of her pain.   No signs of infection.  Doubt referred pain.  Home with watchful waiting, MRI as outpatient if pain continues without other suggestive symptoms.     Diagnosis:    ICD-10-CM    1. Pain of right thigh M79.651    2. S/P coronary angiogram Z98.890        Disposition:  discharged to home    Scribe Disclosure:  Giovani MAZARIEGOS am serving as a scribe at 3:41 AM on 10/26/2018 to document services personally  performed by Guille Foreman MD based on my observations and the provider's statements to me.     10/26/2018   [facility] EMERGENCY DEPARTMENT       Guille Foreman MD  10/26/18 0541

## 2018-10-26 NOTE — ED AVS SNAPSHOT
[facility] Emergency Department    [address] [place] [address]    Phone:  [phone]    Fax:  [phone]                                       Desire Moise   MRN: [id number]    Department:  [facility] Emergency Department   Date of Visit:  10/26/2018           Patient Information     Date Of Birth          1960        Your diagnoses for this visit were:     Pain of right thigh     S/P coronary angiogram        You were seen by Guille Foreman MD.      Follow-up Information     Follow up with Matilde Christopher.    Specialty:  Internal Medicine    Contact information:    [facility]  [address] [place] [address]  [phone]          Discharge Instructions         Myalgias  Myalgias are another word for muscle aches and soreness. This is a symptom, not a disease. Myalgias can have many causes. A cold, the flu, or an acute infection can cause them. So can any illness with a high fever. They may happen after exertion (such as heavy exercise) or injury (such as an accident or fall). Some medicines (such as statins and certain antidepressants) can cause myalgias. They can also be a symptom of chronic or ongoing medical problems (such as lupus, chronic fatigue, or hypothyroidism). With these illnesses, other serious symptoms often occur in addition to muscle pain and soreness.    Myalgias most often go away on their own. If they don't go away, come back, or are severe, testing may be needed to help find the cause.  Home care    Rest until you feel better.    Follow instructions that you were given for how to care for yourself. This may depend on the cause of your myalgias.     If myalgia is thought to be due to a medicine, be sure to talk to the doctor that prescribed the medicine about the best course of action.    To control pain, take prescription or over-the-counter medicines as directed. Unless told not to, you can try  acetaminophen or ibuprofen.  Follow-up care  Follow up with your healthcare provider or as advised. If your symptoms do not go away in a few days or if they come back, follow up with your healthcare provider for an exam and testing.  When to see medical advice  Call your healthcare provider for any of the following:    Fever of 100.4 F (38 C) or higher, or as directed by your healthcare provider    Pain that gets worse and not better, or that goes away and comes back    New joint pains    New rash    Severe headache, neck pain, drowsiness, or confusion  Date Last Reviewed: 3/1/2017    [id number] [employer]. [address] [place] [address]. All rights reserved. This information is not intended as a substitute for professional medical care. Always follow your healthcare professional's instructions.          Your next 10 appointments already scheduled     Oct 26, 2018 10:00 AM CDT   Cardiac Treatment with Rh Cardiac Rehab 1   [facility] ([facility])    [address] [place] [address], [address]  [facility] [address]   [phone]            Oct 29, 2018  8:00 AM CDT   Cardiac Treatment with Rh Cardiac Rehab 1   [facility] ([facility])    [address] [place] [address], [address]  [facility] [address]   [phone]            Oct 31, 2018 10:00 AM CDT   Cardiac Treatment with Rh Cardiac Rehab 1   [facility] ([facility])    [address], [address]  [facility] [address]   [phone]            Nov 01, 2018  9:30 AM CDT   Cardiac Treatment with Rh Cardiac Rehab 2   [facility] ([facility])    [address], [address]  [facility] [address]   [phone]            Nov 06, 2018  8:15 AM CST   Cardiac Treatment with Rh Cardiac Rehab 1   [facility] ([facility])    [address], [address]  [facility] [address]    [phone]            Nov 07, 2018 10:00 AM CST   Cardiac Treatment with Rh Cardiac Rehab 1   [facility] ([facility])    [address], [address]  [facility] [address]   [phone]            Nov 09, 2018 10:00 AM CST   Cardiac Treatment with Rh Cardiac Rehab 1   [facility] ([facility])    [address], [address]  [facility] [address]   [phone]            Nov 12, 2018 10:00 AM CST   Cardiac Treatment with Rh Cardiac Rehab 1   [facility] ([facility])    [address], [address]  [facility] [address]   [phone]            Nov 14, 2018  1:00 PM CST   Cardiac Treatment with Rh Cardiac Rehab 1   [facility] ([facility])    [address], [address]  [facility] [address]   [phone]            Nov 16, 2018  1:00 PM CST   Cardiac Treatment with Rh Cardiac Rehab 1   [facility] ([facility])    [address], [address]  [facility] [address]   [phone]              24 Hour Appointment Hotline       To make an appointment at any [facility] clinic, call [phone] ([phone]). If you don't have a family doctor or clinic, we will help you find one. [facility] [place] clinics are conveniently located to serve the needs of you and your family.             Review of your medicines      Our records show that you are taking the medicines listed below. If these are incorrect, please call your family doctor or clinic.        Dose / Directions Last dose taken    acetaminophen 325 MG tablet   Commonly known as:  TYLENOL   Dose:  650 mg   Quantity:  30 tablet        Take 2 tablets (650 mg) by mouth every 4 hours as needed for other (mild pain)   Refills:  0        aspirin 81 MG EC tablet   Dose:  81 mg   Quantity:  30 tablet        Take 1 tablet (81 mg) by mouth daily   Refills:  1        atorvastatin 40 MG  tablet   Commonly known as:  LIPITOR   Dose:  40 mg   Quantity:  30 tablet        Take 1 tablet (40 mg) by mouth daily   Refills:  3        clopidogrel 75 MG tablet   Commonly known as:  PLAVIX   Dose:  75 mg   Quantity:  30 tablet        Take 1 tablet (75 mg) by mouth daily   Refills:  3        lisinopril 10 MG tablet   Commonly known as:  PRINIVIL/ZESTRIL   Dose:  10 mg   Quantity:  30 tablet        Take 1 tablet (10 mg) by mouth daily   Refills:  3        metoprolol tartrate 25 MG tablet   Commonly known as:  LOPRESSOR   Dose:  25 mg   Quantity:  60 tablet        Take 1 tablet (25 mg) by mouth 2 times daily   Refills:  3        nitroGLYcerin 0.4 MG sublingual tablet   Commonly known as:  NITROSTAT   Quantity:  25 tablet        For chest pain place 1 tablet under the tongue every 5 minutes for 3 doses. If symptoms persist 5 minutes after 1st dose call 911.   Refills:  0                Procedures and tests performed during your visit     POC US SOFT TISSUE    US Lower Extremity Arterial rt    US Lower Extremity Venous Duplex Right      Orders Needing Specimen Collection     None      Pending Results     Date and Time Order Name Status Description    10/26/2018 0456 POC US SOFT TISSUE In process     10/26/2018 0350 US Lower Extremity Arterial rt In process     10/26/2018 0350 US Lower Extremity Venous Duplex Right Preliminary             Pending Culture Results     No orders found from 10/24/2018 to 10/27/2018.            Pending Results Instructions     If you had any lab results that were not finalized at the time of your Discharge, you can call the ED Lab Result RN at [phone]. You will be contacted by this team for any positive Lab results or changes in treatment. The nurses are available 7 days a week from 10A to 6:30P.  You can leave a message 24 hours per day and they will return your call.        Test Results From Your Hospital Stay        10/26/2018  4:54 AM      Narrative     US LOWER EXTREMITY VENOUS  DUPLEX RIGHT  10/26/2018 4:37 AM     HISTORY: Recent angio on right leg, right leg swelling, evaluate for  DVT, fluid collection adductor compartment (on Plavix and aspirin).    COMPARISON: None.    FINDINGS: Gray-scale, color and Doppler spectral analysis ultrasound  was performed of the right leg. Compression and augmentation imaging  was performed.    There is no evidence for deep venous thrombosis. The veins compress  and augment normally. There is a Baker's cyst measuring 3.2 x 0.9 x  1.7 cm. No other abnormal fluid collection.        Impression     IMPRESSION: No DVT.         10/26/2018  4:07 AM      Result not yet available     Exam Ended         10/26/2018  4:56 AM      Result not yet available     Exam Begun                Clinical Quality Measure: Blood Pressure Screening     Your blood pressure was checked while you were in the emergency department today. The last reading we obtained was  BP: (!) 163/100 . Please read the guidelines below about what these numbers mean and what you should do about them.  If your systolic blood pressure (the top number) is less than 120 and your diastolic blood pressure (the bottom number) is less than 80, then your blood pressure is normal. There is nothing more that you need to do about it.  If your systolic blood pressure (the top number) is 120-139 or your diastolic blood pressure (the bottom number) is 80-89, your blood pressure may be higher than it should be. You should have your blood pressure rechecked within a year by a primary care provider.  If your systolic blood pressure (the top number) is 140 or greater or your diastolic blood pressure (the bottom number) is 90 or greater, you may have high blood pressure. High blood pressure is treatable, but if left untreated over time it can put you at risk for heart attack, stroke, or kidney failure. You should have your blood pressure rechecked by a primary care provider within the next 4 weeks.  If your provider in the  "emergency department today gave you specific instructions to follow-up with your doctor or provider even sooner than that, you should follow that instruction and not wait for up to 4 weeks for your follow-up visit.        Thank you for choosing [facility]       Thank you for choosing [facility] for your care. Our goal is always to provide you with excellent care. Hearing back from our patients is one way we can continue to improve our services. Please take a few minutes to complete the written survey that you may receive in the mail after you visit with us. Thank you!        [facility] Information     [employer] [facility] lets you send messages to your doctor, view your test results, renew your prescriptions, schedule appointments and more. To sign up, go to [email or web address] . Click on \"Log in\" on the left side of the screen, which will take you to the Welcome page. Then click on \"Sign up Now\" on the right side of the page.     You will be asked to enter the access code listed below, as well as some personal information. Please follow the directions to create your username and password.     Your access code is: [email or web address]  Expires: 2019 10:19 AM     Your access code will  in 90 days. If you need help or a new code, please call your [facility] clinic or [phone].        Care EveryWhere ID     This is your Care EveryWhere ID. This could be used by other organizations to access your [facility] medical records  [id number]        Equal Access to Services     FRANK ALBERT : Hadii jamaal bondso Soalan, waaxda luqadaha, qaybta kaalmada adelupeyada, navin vance . So [facility] [phone].    ATENCIÓN: Si habla español, tiene a manzo disposición servicios gratuitos de asistencia lingüística. Lizbet al [phone].    We comply with applicable federal civil rights laws and [place] laws. We do not discriminate on the basis of race, color, national origin, age, disability, sex, sexual orientation, or " gender identity.            After Visit Summary       This is your record. Keep this with you and show to your community pharmacist(s) and doctor(s) at your next visit.

## 2018-10-26 NOTE — TELEPHONE ENCOUNTER
Pt called stating that she presented to the ED last night for right leg pain. Pt stated that she did have groin site pain in the hospital after the angio and they did do an arterial US 10/14/18 but was negative for hematoma or pseudoaneurysm.  Pt stated that the pain started worsen the morning she came in to see Silvia for post hospital follow up which was on 10/23/18. Pt stated that she also has started to notice swelling on her LE joints. It is on both legs but the right leg is worse and painful. Left leg does not hurt. Pt stated that it is super painful to walk. The pain feels like it is coming from the groin site that they accessed for the angio and traveling down the leg. Pt did mention that there is a bump at the groin site about the size of a quarter.     Pt stated that the ED doctor did a bunch of different tests and is unsure of what is causing this pain as everything was negative. Pt wondering if part of it could be from the statin that was started.   Reviewed test results below with patient. Per ED MD note he recommended MRI as outpatient if pain continues. Informed patient that the tests results show negative for the common side effects of angio's that we look for but RN would send message for Dr. Zlueta to review and see if he has any recommendations. In the mean time recommended patient rest leg with elevation and tylenol. Pt agreed to plan.      Hx: SCAD NSTEMI 10/13/18     US LE arterial-   IMPRESSION:   No evidence of right groin pseudoaneurysm, arteriovenous fistula or  hematoma     US soft tissue-   INTERPRETATION: No Abscess identified, no edema in soft tissues. No hematoma noted.       US LE Venous-   There is no evidence for deep venous thrombosis. The veins compress  and augment normally. There is a Baker's cyst measuring 3.2 x 0.9 x  1.7 cm. No other abnormal fluid collection.  IMPRESSION: No DVT.  (spoke with patient regarding Bakers cyst and she stated not in area of pain)    Will route  to Dr. Zuleta to review

## 2018-10-26 NOTE — ED TRIAGE NOTES
Pt to ER with c/o right leg pain pt had recent angiogram from a SCAD MI that she was at [facility] for now pain at the site

## 2018-10-29 ENCOUNTER — HOSPITAL ENCOUNTER (OUTPATIENT)
Dept: CARDIAC REHAB | Facility: CLINIC | Age: 58
End: 2018-10-29
Attending: STUDENT IN AN ORGANIZED HEALTH CARE EDUCATION/TRAINING PROGRAM
Payer: COMMERCIAL

## 2018-10-29 PROCEDURE — 93798 PHYS/QHP OP CAR RHAB W/ECG: CPT

## 2018-10-29 PROCEDURE — 40000116 ZZH STATISTIC OP CR VISIT

## 2018-10-29 NOTE — TELEPHONE ENCOUNTER
Given likely SCAD (increased risk of SCAD with statins), I would like this patient to discontinue the atorvastatin and see how she does.  Juan Carlos Zuleta MD, FACC  October 28, 2018 10:46 PM

## 2018-10-29 NOTE — TELEPHONE ENCOUNTER
Called pt, no answer. No consent to communicate on file.     Left non detailed VM requesting call back.    Uyen Urbina RN BSN   8:45 AM 10/29/18

## 2018-10-29 NOTE — TELEPHONE ENCOUNTER
Called pt and reviewed Dr. Zuleta's rec to discontinue atorvastatin. I asked her to call us back in 2 wks w/ sx update, or sooner w/ worsening sx. She agreed to plan.     Uyen Urbina RN BSN   9:50 AM 10/29/18

## 2018-10-31 ENCOUNTER — HOSPITAL ENCOUNTER (OUTPATIENT)
Dept: CARDIAC REHAB | Facility: CLINIC | Age: 58
End: 2018-10-31
Attending: STUDENT IN AN ORGANIZED HEALTH CARE EDUCATION/TRAINING PROGRAM
Payer: COMMERCIAL

## 2018-10-31 PROCEDURE — 40000116 ZZH STATISTIC OP CR VISIT: Performed by: OCCUPATIONAL THERAPIST

## 2018-10-31 PROCEDURE — 93798 PHYS/QHP OP CAR RHAB W/ECG: CPT | Performed by: OCCUPATIONAL THERAPIST

## 2018-11-02 ENCOUNTER — HOSPITAL ENCOUNTER (OUTPATIENT)
Dept: CARDIAC REHAB | Facility: CLINIC | Age: 58
End: 2018-11-02
Attending: STUDENT IN AN ORGANIZED HEALTH CARE EDUCATION/TRAINING PROGRAM
Payer: COMMERCIAL

## 2018-11-02 PROCEDURE — 93798 PHYS/QHP OP CAR RHAB W/ECG: CPT | Performed by: OCCUPATIONAL THERAPIST

## 2018-11-02 PROCEDURE — 40000116 ZZH STATISTIC OP CR VISIT: Performed by: OCCUPATIONAL THERAPIST

## 2018-11-06 ENCOUNTER — HOSPITAL ENCOUNTER (OUTPATIENT)
Dept: CARDIAC REHAB | Facility: CLINIC | Age: 58
End: 2018-11-06
Attending: STUDENT IN AN ORGANIZED HEALTH CARE EDUCATION/TRAINING PROGRAM
Payer: COMMERCIAL

## 2018-11-06 PROCEDURE — 40000116 ZZH STATISTIC OP CR VISIT

## 2018-11-06 PROCEDURE — 93798 PHYS/QHP OP CAR RHAB W/ECG: CPT

## 2018-11-09 ENCOUNTER — HOSPITAL ENCOUNTER (OUTPATIENT)
Dept: CARDIAC REHAB | Facility: CLINIC | Age: 58
End: 2018-11-09
Attending: STUDENT IN AN ORGANIZED HEALTH CARE EDUCATION/TRAINING PROGRAM
Payer: COMMERCIAL

## 2018-11-09 PROCEDURE — 40000116 ZZH STATISTIC OP CR VISIT

## 2018-11-09 PROCEDURE — 93798 PHYS/QHP OP CAR RHAB W/ECG: CPT

## 2018-11-12 ENCOUNTER — HOSPITAL ENCOUNTER (OUTPATIENT)
Dept: CARDIAC REHAB | Facility: CLINIC | Age: 58
End: 2018-11-12
Attending: STUDENT IN AN ORGANIZED HEALTH CARE EDUCATION/TRAINING PROGRAM
Payer: COMMERCIAL

## 2018-11-12 PROCEDURE — 93798 PHYS/QHP OP CAR RHAB W/ECG: CPT

## 2018-11-12 PROCEDURE — 40000116 ZZH STATISTIC OP CR VISIT

## 2018-11-14 ENCOUNTER — HOSPITAL ENCOUNTER (OUTPATIENT)
Dept: CARDIAC REHAB | Facility: CLINIC | Age: 58
End: 2018-11-14
Attending: STUDENT IN AN ORGANIZED HEALTH CARE EDUCATION/TRAINING PROGRAM
Payer: COMMERCIAL

## 2018-11-14 PROCEDURE — 93798 PHYS/QHP OP CAR RHAB W/ECG: CPT | Performed by: OCCUPATIONAL THERAPIST

## 2018-11-14 PROCEDURE — 40000116 ZZH STATISTIC OP CR VISIT: Performed by: OCCUPATIONAL THERAPIST

## 2018-11-16 ENCOUNTER — HOSPITAL ENCOUNTER (OUTPATIENT)
Dept: CARDIAC REHAB | Facility: CLINIC | Age: 58
End: 2018-11-16
Attending: STUDENT IN AN ORGANIZED HEALTH CARE EDUCATION/TRAINING PROGRAM
Payer: COMMERCIAL

## 2018-11-16 PROCEDURE — 40000116 ZZH STATISTIC OP CR VISIT

## 2018-11-16 PROCEDURE — 93798 PHYS/QHP OP CAR RHAB W/ECG: CPT

## 2018-11-19 ENCOUNTER — HOSPITAL ENCOUNTER (OUTPATIENT)
Dept: CARDIAC REHAB | Facility: CLINIC | Age: 58
End: 2018-11-19
Attending: STUDENT IN AN ORGANIZED HEALTH CARE EDUCATION/TRAINING PROGRAM
Payer: COMMERCIAL

## 2018-11-19 PROCEDURE — 93798 PHYS/QHP OP CAR RHAB W/ECG: CPT

## 2018-11-19 PROCEDURE — 40000116 ZZH STATISTIC OP CR VISIT

## 2018-11-21 ENCOUNTER — HOSPITAL ENCOUNTER (OUTPATIENT)
Dept: CARDIAC REHAB | Facility: CLINIC | Age: 58
End: 2018-11-21
Attending: STUDENT IN AN ORGANIZED HEALTH CARE EDUCATION/TRAINING PROGRAM
Payer: COMMERCIAL

## 2018-11-21 PROCEDURE — 93798 PHYS/QHP OP CAR RHAB W/ECG: CPT

## 2018-11-21 PROCEDURE — 40000116 ZZH STATISTIC OP CR VISIT

## 2018-11-21 PROCEDURE — 40000575 ZZH STATISTIC OP CARDIAC VISIT #2

## 2018-11-21 PROCEDURE — 93797 PHYS/QHP OP CAR RHAB WO ECG: CPT

## 2018-11-21 PROCEDURE — 93798 PHYS/QHP OP CAR RHAB W/ECG: CPT | Performed by: REHABILITATION PRACTITIONER

## 2018-11-23 ENCOUNTER — HOSPITAL ENCOUNTER (OUTPATIENT)
Dept: CARDIAC REHAB | Facility: CLINIC | Age: 58
End: 2018-11-23
Attending: STUDENT IN AN ORGANIZED HEALTH CARE EDUCATION/TRAINING PROGRAM
Payer: COMMERCIAL

## 2018-11-23 PROCEDURE — 93798 PHYS/QHP OP CAR RHAB W/ECG: CPT

## 2018-11-23 PROCEDURE — 40000116 ZZH STATISTIC OP CR VISIT

## 2018-11-26 ENCOUNTER — HOSPITAL ENCOUNTER (OUTPATIENT)
Dept: CARDIAC REHAB | Facility: CLINIC | Age: 58
End: 2018-11-26
Attending: STUDENT IN AN ORGANIZED HEALTH CARE EDUCATION/TRAINING PROGRAM
Payer: COMMERCIAL

## 2018-11-26 PROCEDURE — 40000116 ZZH STATISTIC OP CR VISIT: Performed by: OCCUPATIONAL THERAPIST

## 2018-11-26 PROCEDURE — 93798 PHYS/QHP OP CAR RHAB W/ECG: CPT | Performed by: OCCUPATIONAL THERAPIST

## 2018-11-28 ENCOUNTER — HOSPITAL ENCOUNTER (OUTPATIENT)
Dept: CARDIAC REHAB | Facility: CLINIC | Age: 58
End: 2018-11-28
Attending: STUDENT IN AN ORGANIZED HEALTH CARE EDUCATION/TRAINING PROGRAM
Payer: COMMERCIAL

## 2018-11-28 PROCEDURE — 93798 PHYS/QHP OP CAR RHAB W/ECG: CPT | Performed by: OCCUPATIONAL THERAPIST

## 2018-11-28 PROCEDURE — 40000116 ZZH STATISTIC OP CR VISIT: Performed by: OCCUPATIONAL THERAPIST

## 2018-11-29 ENCOUNTER — OFFICE VISIT (OUTPATIENT)
Dept: CARDIOLOGY | Facility: CLINIC | Age: 58
End: 2018-11-29
Payer: COMMERCIAL

## 2018-11-29 VITALS
SYSTOLIC BLOOD PRESSURE: 150 MMHG | HEART RATE: 56 BPM | HEIGHT: 66 IN | BODY MASS INDEX: 24.15 KG/M2 | DIASTOLIC BLOOD PRESSURE: 86 MMHG | WEIGHT: 150.3 LBS

## 2018-11-29 DIAGNOSIS — I21.4 NSTEMI (NON-ST ELEVATED MYOCARDIAL INFARCTION) (H): Primary | ICD-10-CM

## 2018-11-29 DIAGNOSIS — I25.5 ISCHEMIC CARDIOMYOPATHY: ICD-10-CM

## 2018-11-29 DIAGNOSIS — R00.2 PALPITATIONS: ICD-10-CM

## 2018-11-29 DIAGNOSIS — I25.42 SPONTANEOUS DISSECTION OF CORONARY ARTERY: ICD-10-CM

## 2018-11-29 PROCEDURE — 99214 OFFICE O/P EST MOD 30 MIN: CPT | Performed by: INTERNAL MEDICINE

## 2018-11-29 NOTE — PROGRESS NOTES
HPI and Plan:   See dictation:[id number]    Orders Placed This Encounter   Procedures     Follow-Up with Cardiologist     Echocardiogram       No orders of the defined types were placed in this encounter.      There are no discontinued medications.      Encounter Diagnoses   Name Primary?     Spontaneous dissection of coronary artery Yes     Ischemic cardiomyopathy      Palpitations        CURRENT MEDICATIONS:  Current Outpatient Prescriptions   Medication Sig Dispense Refill     acetaminophen (TYLENOL) 325 MG tablet Take 2 tablets (650 mg) by mouth every 4 hours as needed for other (mild pain) 30 tablet 0     aspirin 81 MG EC tablet Take 1 tablet (81 mg) by mouth daily 30 tablet 1     clopidogrel (PLAVIX) 75 MG tablet Take 1 tablet (75 mg) by mouth daily 30 tablet 3     lisinopril (PRINIVIL/ZESTRIL) 10 MG tablet Take 1 tablet (10 mg) by mouth daily 30 tablet 3     metoprolol tartrate (LOPRESSOR) 25 MG tablet Take 1 tablet (25 mg) by mouth 2 times daily 60 tablet 3     nitroGLYcerin (NITROSTAT) 0.4 MG sublingual tablet For chest pain place 1 tablet under the tongue every 5 minutes for 3 doses. If symptoms persist 5 minutes after 1st dose call 911. (Patient not taking: Reported on 10/23/2018) 25 tablet 0       ALLERGIES   No Known Allergies    PAST MEDICAL HISTORY:  Past Medical History:   Diagnosis Date     ACS (acute coronary syndrome) (H) 10/13/2018     Anemia 4/16/2007     Problem list name updated by automated process. Provider to review     CAD (coronary artery disease)     nonobstructive     Diverticulosis      Family history of other cardiovascular diseases 9/23/2002     Problem list name updated by automated process. Provider to review and confirm Problem list name updated by automated process. Provider to review     GERD (gastroesophageal reflux disease)      HTN (hypertension)      NSTEMI (non-ST elevated myocardial infarction) (H) 10/13/2018     Palpitations      Spina bifida occulta 10/23/2002      Spontaneous dissection of coronary artery        PAST SURGICAL HISTORY:  Past Surgical History:   Procedure Laterality Date     GYN SURGERY       HYSTERECTOMY       INCISION OF HYMEN  1980     OPEN REDUCTION INTERNAL FIXATION WRIST Right 9/11/2017    Procedure: OPEN REDUCTION INTERNAL FIXATION WRIST;  Open reduction and internal fixation right intra-articular displaced distal radius fracture;  Surgeon: Santi Villarreal MD;  Location: [facility] OR     REMOVE HARDWARE UPPER EXTREMITY Right 2/2/2018    Procedure: REMOVE HARDWARE UPPER EXTREMITY;  Removal of deep implant, right distal radius;  Surgeon: Santi Villarreal MD;  Location: [facility] OR     [place] teeth surgery  1980       FAMILY HISTORY:  Family History   Problem Relation Age of Onset     Heart Disease Father      cab     Lipids Father      Cancer Maternal Grandmother      cancer  Breast       SOCIAL HISTORY:  Social History     Social History     Marital status:      Spouse name: John     Number of children: 3     Years of education: 15     Occupational History     Teacher [employer]     Social History Main Topics     Smoking status: Never Smoker     Smokeless tobacco: Never Used     Alcohol use No     Drug use: No     Sexual activity: Yes     Partners: Male     Birth control/ protection: Condom     Other Topics Concern     Exercise Yes     Seat Belt Yes     Self-Exams Yes     Social History Narrative       Review of Systems:  Skin:  Negative       Eyes:  Positive for glasses    ENT:  Positive for   some blood when blowing nose  Respiratory:  Positive for dyspnea at rest     Cardiovascular:    Positive for;palpitations;lightheadedness    Gastroenterology: Negative      Genitourinary:  Negative      Musculoskeletal:  Negative      Neurologic:  Negative      Psychiatric:  Positive for   a little stress due to work  Heme/Lymph/Imm:  Negative      Endocrine:  Negative        Physical Exam:  Vitals: /86 (BP Location: Right arm, Patient Position: Sitting, Cuff  "Size: Adult Large)  Pulse 56  Ht 1.676 m (5' 6\")  Wt 68.2 kg (150 lb 4.8 oz)  LMP 06/15/2003  BMI 24.26 kg/m2    Constitutional:  cooperative, alert and oriented, well developed, well nourished, in no acute distress        Skin:  warm and dry to the touch, no apparent skin lesions or masses noted          Head:  normocephalic, no masses or lesions        Eyes:  pupils equal and round;conjunctivae and lids unremarkable;sclera white;no xanthalasma;EOMS intact        Lymph:      ENT:  no pallor or cyanosis, dentition good        Neck:  carotid pulses are full and equal bilaterally, JVP normal, no carotid bruit        Respiratory:  normal breath sounds, clear to auscultation, normal A-P diameter, normal symmetry, normal respiratory excursion, no use of accessory muscles         Cardiac: regular rhythm, normal S1/S2, no S3 or S4, apical impulse not displaced, no murmurs, gallops or rubs                pulses full and equal, no bruits auscultated                                        GI:  abdomen soft, non-tender, BS normoactive, no mass, no HSM, no bruits        Extremities and Muscular Skeletal:  no deformities, clubbing, cyanosis, erythema observed;no edema              Neurological:  no gross motor deficits;affect appropriate        Psych:  Alert and Oriented x 3        CC  No referring provider defined for this encounter.              "

## 2018-11-29 NOTE — LETTER
11/29/2018    Matildenixon Christopher  [facility] [address]   [place] [address]    RE: Desire Moise       Dear Colleague,    I had the pleasure of seeing Desire Moise in the [facility].    HPI and Plan:   See dictation:[id number]    Orders Placed This Encounter   Procedures     Follow-Up with Cardiologist     Echocardiogram       No orders of the defined types were placed in this encounter.      There are no discontinued medications.      Encounter Diagnoses   Name Primary?     Spontaneous dissection of coronary artery Yes     Ischemic cardiomyopathy      Palpitations        CURRENT MEDICATIONS:  Current Outpatient Prescriptions   Medication Sig Dispense Refill     acetaminophen (TYLENOL) 325 MG tablet Take 2 tablets (650 mg) by mouth every 4 hours as needed for other (mild pain) 30 tablet 0     aspirin 81 MG EC tablet Take 1 tablet (81 mg) by mouth daily 30 tablet 1     clopidogrel (PLAVIX) 75 MG tablet Take 1 tablet (75 mg) by mouth daily 30 tablet 3     lisinopril (PRINIVIL/ZESTRIL) 10 MG tablet Take 1 tablet (10 mg) by mouth daily 30 tablet 3     metoprolol tartrate (LOPRESSOR) 25 MG tablet Take 1 tablet (25 mg) by mouth 2 times daily 60 tablet 3     nitroGLYcerin (NITROSTAT) 0.4 MG sublingual tablet For chest pain place 1 tablet under the tongue every 5 minutes for 3 doses. If symptoms persist 5 minutes after 1st dose call 911. (Patient not taking: Reported on 10/23/2018) 25 tablet 0       ALLERGIES   No Known Allergies    PAST MEDICAL HISTORY:  Past Medical History:   Diagnosis Date     ACS (acute coronary syndrome) (H) 10/13/2018     Anemia 4/16/2007     Problem list name updated by automated process. Provider to review     CAD (coronary artery disease)     nonobstructive     Diverticulosis      Family history of other cardiovascular diseases 9/23/2002     Problem list name updated by automated process. Provider to review and confirm Problem list name updated by  automated process. Provider to review     GERD (gastroesophageal reflux disease)      HTN (hypertension)      NSTEMI (non-ST elevated myocardial infarction) (H) 10/13/2018     Palpitations      Spina bifida occulta 10/23/2002     Spontaneous dissection of coronary artery        PAST SURGICAL HISTORY:  Past Surgical History:   Procedure Laterality Date     GYN SURGERY       HYSTERECTOMY       INCISION OF HYMEN  1980     OPEN REDUCTION INTERNAL FIXATION WRIST Right 9/11/2017    Procedure: OPEN REDUCTION INTERNAL FIXATION WRIST;  Open reduction and internal fixation right intra-articular displaced distal radius fracture;  Surgeon: Santi Villarreal MD;  Location: [facility] OR     REMOVE HARDWARE UPPER EXTREMITY Right 2/2/2018    Procedure: REMOVE HARDWARE UPPER EXTREMITY;  Removal of deep implant, right distal radius;  Surgeon: Santi Villarreal MD;  Location: [facility] OR     [place] teeth surgery  1980       FAMILY HISTORY:  Family History   Problem Relation Age of Onset     Heart Disease Father      cab     Lipids Father      Cancer Maternal Grandmother      cancer  Breast       SOCIAL HISTORY:  Social History     Social History     Marital status:      Spouse name: John     Number of children: 3     Years of education: 15     Occupational History     Teacher [employer]     Social History Main Topics     Smoking status: Never Smoker     Smokeless tobacco: Never Used     Alcohol use No     Drug use: No     Sexual activity: Yes     Partners: Male     Birth control/ protection: Condom     Other Topics Concern     Exercise Yes     Seat Belt Yes     Self-Exams Yes     Social History Narrative       Review of Systems:  Skin:  Negative       Eyes:  Positive for glasses    ENT:  Positive for   some blood when blowing nose  Respiratory:  Positive for dyspnea at rest     Cardiovascular:    Positive for;palpitations;lightheadedness    Gastroenterology: Negative      Genitourinary:  Negative      Musculoskeletal:  Negative     "  Neurologic:  Negative      Psychiatric:  Positive for   a little stress due to work  Heme/Lymph/Imm:  Negative      Endocrine:  Negative        Physical Exam:  Vitals: /86 (BP Location: Right arm, Patient Position: Sitting, Cuff Size: Adult Large)  Pulse 56  Ht 1.676 m (5' 6\")  Wt 68.2 kg (150 lb 4.8 oz)  LMP 06/15/2003  BMI 24.26 kg/m2    Constitutional:  cooperative, alert and oriented, well developed, well nourished, in no acute distress        Skin:  warm and dry to the touch, no apparent skin lesions or masses noted          Head:  normocephalic, no masses or lesions        Eyes:  pupils equal and round;conjunctivae and lids unremarkable;sclera white;no xanthalasma;EOMS intact        Lymph:      ENT:  no pallor or cyanosis, dentition good        Neck:  carotid pulses are full and equal bilaterally, JVP normal, no carotid bruit        Respiratory:  normal breath sounds, clear to auscultation, normal A-P diameter, normal symmetry, normal respiratory excursion, no use of accessory muscles         Cardiac: regular rhythm, normal S1/S2, no S3 or S4, apical impulse not displaced, no murmurs, gallops or rubs                pulses full and equal, no bruits auscultated                                        GI:  abdomen soft, non-tender, BS normoactive, no mass, no HSM, no bruits        Extremities and Muscular Skeletal:  no deformities, clubbing, cyanosis, erythema observed;no edema              Neurological:  no gross motor deficits;affect appropriate        Psych:  Alert and Oriented x 3        CC  No referring provider defined for this encounter.                Thank you for allowing me to participate in the care of your patient.      Sincerely,     Juan Carlos Zuleta MD     [facility]    cc:   No referring provider defined for this encounter.        "

## 2018-11-29 NOTE — LETTER
11/29/2018      Matilde Christopher  [facility] [address]   [place] [address]      RE: Desire Moise       Dear Colleague,    I had the pleasure of seeing Desire Moise in the [facility].    Service Date: 11/29/2018      PRIMARY CARE PHYSICIAN:  Dr. Matilde Christopher.      Dear Dr. Christopher:      I again had the pleasure of seeing your patient, Desire Moise, at [facility] for evaluation of a non-ST elevation MI on 10/13/2018.  This non-ST elevation MI was felt to be due to a spontaneous coronary artery dissection.      HISTORY OF PRESENT ILLNESS:  Desire Moise was admitted to the hospital on 10/13 with what sounded like unstable angina.  Troponin levels were elevated.  EKG showed sinus rhythm with nonspecific ST-T wave changes.  Coronary angiography was performed and demonstrated a waist in the first obtuse marginal branch artery with tapering in the midvessel.  There was EMMIE grade 3 flow, however.  Left ventriculogram showed mild hypokinesis of the basal anterolateral wall.  Her echocardiogram showed severe mid and basal inferolateral and lateral wall hypokinesis with ejection fraction of 50%-55%.  The patient has been participating in outpatient cardiac rehab.  She has occasional nondescript chest discomfort that lasts for seconds at a time.  She does have some anxiety.  She denies significant shortness of breath.  She is able to exercise free of chest pain or shortness of breath.  She had 1-1/2 days where she felt that her heart was beating rapidly at night and then the next day.  This was associated with some chest pressure.  She took her pulse and found her heart rate to be normal 60 or less.  This has not recurred.  She also notes some orthostasis in the morning which is improved as the day goes on.  She was started on atorvastatin and this was discontinued given her diagnosis.  She remains on clopidogrel for a year.  She is also  on aspirin, lisinopril and metoprolol.  She denies PND, orthopnea, peripheral edema, syncope or presyncope.      PHYSICAL EXAMINATION:  As listed below.      ASSESSMENT:   1.  Miguel Moise is a pleasant 58-year-old female who was hospitalized 10/13 for chest discomfort and a non-ST elevation MI.  Coronary angiography was consistent with coronary artery dissection in the first obtuse marginal branch artery.  We reviewed her previous testing to rule out any fibromuscular dysplasia.  An MRI of her head and neck in 2015 was normal.  A previous CT scan of her chest and abdomen and pelvis did not show any vascular abnormalities either.  She is currently stable.  She is due to discontinue her cardiac rehab in December.  She will continue to exercise on her own.  I have given her reassurance.   2.  We will obtain an echocardiogram in 2 months to assess left ventricular function.  If this is normal we will continue with her current medications for a year.  Her clopidogrel and lisinopril can then be discontinued.  Metoprolol I would continue for at least 3 years along with long-term aspirin therapy.   3.  The patient asks if she can do some light lifting.  Her grandchild weighs 16 pounds and I have suggested this would be fine.  She should also do some light weight work both in cardiac rehab and at home.  I would continue a lifting restriction of about 20-25 pounds for now.      It is my pleasure to assist in the care of Miguel Moise.  I will review her echocardiogram in January.  I will see the patient again in 6 months or earlier on a p.r.n. basis.  Her  was in attendance today.  All their questions were answered to their satisfaction.      Mark Edouard MD      cc:   Matilde Christopher MD    [facility]    [id number] [place] [address]  [id number]         MARK EDOUARD MD, [facility]             D: 11/29/2018   T: 11/29/2018   MT: DREW      Name:     MIGUEL MOISE   MRN:      [id number]         Account:      [id number]   :      1960           Service Date: 2018      Document: [id number]         Outpatient Encounter Prescriptions as of 2018   Medication Sig Dispense Refill     acetaminophen (TYLENOL) 325 MG tablet Take 2 tablets (650 mg) by mouth every 4 hours as needed for other (mild pain) 30 tablet 0     aspirin 81 MG EC tablet Take 1 tablet (81 mg) by mouth daily 30 tablet 1     clopidogrel (PLAVIX) 75 MG tablet Take 1 tablet (75 mg) by mouth daily 30 tablet 3     lisinopril (PRINIVIL/ZESTRIL) 10 MG tablet Take 1 tablet (10 mg) by mouth daily 30 tablet 3     metoprolol tartrate (LOPRESSOR) 25 MG tablet Take 1 tablet (25 mg) by mouth 2 times daily 60 tablet 3     nitroGLYcerin (NITROSTAT) 0.4 MG sublingual tablet For chest pain place 1 tablet under the tongue every 5 minutes for 3 doses. If symptoms persist 5 minutes after 1st dose call 911. (Patient not taking: Reported on 10/23/2018) 25 tablet 0     No facility-administered encounter medications on file as of 2018.        Again, thank you for allowing me to participate in the care of your patient.      Sincerely,    Juan Carlos Zuleta MD     [facility]

## 2018-11-29 NOTE — PROGRESS NOTES
Service Date: 11/29/2018      PRIMARY CARE PHYSICIAN:  Dr. Matilde Christopher.      Dear Dr. Christopher:      I again had the pleasure of seeing your patient, Desire Moise, at [facility] for evaluation of a non-ST elevation MI on 10/13/2018.  This non-ST elevation MI was felt to be due to a spontaneous coronary artery dissection.      HISTORY OF PRESENT ILLNESS:  Desire Moise was admitted to the hospital on 10/13 with what sounded like unstable angina.  Troponin levels were elevated.  EKG showed sinus rhythm with nonspecific ST-T wave changes.  Coronary angiography was performed and demonstrated a waist in the first obtuse marginal branch artery with tapering in the midvessel.  There was EMMIE grade 3 flow, however.  Left ventriculogram showed mild hypokinesis of the basal anterolateral wall.  Her echocardiogram showed severe mid and basal inferolateral and lateral wall hypokinesis with ejection fraction of 50%-55%.  The patient has been participating in outpatient cardiac rehab.  She has occasional nondescript chest discomfort that lasts for seconds at a time.  She does have some anxiety.  She denies significant shortness of breath.  She is able to exercise free of chest pain or shortness of breath.  She had 1-1/2 days where she felt that her heart was beating rapidly at night and then the next day.  This was associated with some chest pressure.  She took her pulse and found her heart rate to be normal 60 or less.  This has not recurred.  She also notes some orthostasis in the morning which is improved as the day goes on.  She was started on atorvastatin and this was discontinued given her diagnosis.  She remains on clopidogrel for a year.  She is also on aspirin, lisinopril and metoprolol.  She denies PND, orthopnea, peripheral edema, syncope or presyncope.      PHYSICAL EXAMINATION:  As listed below.      ASSESSMENT:   1.  Desire Moise is a pleasant 58-year-old female who was hospitalized  10/13 for chest discomfort and a non-ST elevation MI.  Coronary angiography was consistent with coronary artery dissection in the first obtuse marginal branch artery.  We reviewed her previous testing to rule out any fibromuscular dysplasia.  An MRI of her head and neck in  was normal.  A previous CT scan of her chest and abdomen and pelvis did not show any vascular abnormalities either.  She is currently stable.  She is due to discontinue her cardiac rehab in December.  She will continue to exercise on her own.  I have given her reassurance.   2.  We will obtain an echocardiogram in 2 months to assess left ventricular function.  If this is normal we will continue with her current medications for a year.  Her clopidogrel and lisinopril can then be discontinued.  Metoprolol I would continue for at least 3 years along with long-term aspirin therapy.   3.  The patient asks if she can do some light lifting.  Her grandchild weighs 16 pounds and I have suggested this would be fine.  She should also do some light weight work both in cardiac rehab and at home.  I would continue a lifting restriction of about 20-25 pounds for now.      It is my pleasure to assist in the care of Miguel Moise.  I will review her echocardiogram in January.  I will see the patient again in 6 months or earlier on a p.r.n. basis.  Her  was in attendance today.  All their questions were answered to their satisfaction.      Mark Edouard MD      cc:   Matilde Christopher MD    [facility]    [address] [id number]         [facility]ARD [facility], [facility]             D: 2018   T: 2018   MT: DREW      Name:     MIGUEL MOISE   MRN:      [id number]        Account:      [id number]   :      1960           Service Date: 2018      Document: [id number]

## 2018-11-29 NOTE — MR AVS SNAPSHOT
After Visit Summary   11/29/2018    Desire Moise    MRN: [id number]           Patient Information     Date Of Birth          1960        Visit Information        Provider Department      11/29/2018 8:15 AM Juan Carlos Zuleta MD [facility]        Today's Diagnoses     Spontaneous dissection of coronary artery    -  1    Ischemic cardiomyopathy        Palpitations           Follow-ups after your visit        Additional Services     Follow-Up with Cardiologist                 Your next 10 appointments already scheduled     Nov 30, 2018 10:00 AM CST   Cardiac Treatment with Rh Cardiac Rehab 1   [facility] ([facility])    [address]  [place] [address]   [phone]            Dec 03, 2018 10:00 AM CST   Cardiac Treatment with Rh Cardiac Rehab 1   [facility] ([facility])    [address], [address]  [place] [address]   [phone]            Dec 05, 2018 10:00 AM CST   Cardiac Treatment with Rh Cardiac Rehab 1   [facility] ([facility])    [address]  [place] [address]   [phone]            Dec 07, 2018 10:00 AM CST   Cardiac Treatment with Rh Cardiac Rehab 1   [facility] ([facility])    [address]  [place] [address]   [phone]            Dec 10, 2018 10:00 AM CST   Cardiac Treatment with Rh Cardiac Rehab 1   [facility] ([facility])    [address]  [place] [address]   [phone]            Dec 12, 2018 10:00 AM CST   Cardiac Treatment with Rh Cardiac Rehab 1   [facility] ([facility])    [address]  [place] [address]   [phone]            Dec 14, 2018 10:00 AM CST   Cardiac Treatment with Rh Cardiac  "Rehab 1   [facility] ([facility])    [address]  [place] [address]   [phone]            Dec 17, 2018 10:00 AM CST   Cardiac Treatment with Rh Cardiac Rehab 1   [facility] ([facility])    [address]  [place] [address]   [phone]            Dec 19, 2018  9:30 AM CST   Cardiac Discharge with Rh Cardiac Rehab 2   [facility] ([facility])    [address]  [place] [address]   [phone]              Future tests that were ordered for you today     Open Future Orders        Priority Expected Expires Ordered    Echocardiogram Routine 12/29/2018 11/29/2019 11/29/2018    Follow-Up with Cardiologist Routine 5/28/2019 11/29/2019 11/29/2018            Who to contact     If you have questions or need follow up information about today's clinic visit or your schedule please contact [facility] directly at [phone].  Normal or non-critical lab and imaging results will be communicated to you by MyChart, letter or phone within 4 business days after the clinic has received the results. If you do not hear from us within 7 days, please contact the clinic through MyChart or phone. If you have a critical or abnormal lab result, we will notify you by phone as soon as possible.  Submit refill requests through [employer] or call your pharmacy and they will forward the refill request to us. Please allow 3 business days for your refill to be completed.          Additional Information About Your Visit        Care EveryWhere ID     This is your Care EveryWhere ID. This could be used by other organizations to access your [place] medical records  [id number]        Your Vitals Were     Pulse Height Last Period BMI (Body Mass Index)          56 1.676 m (5' 6\") 06/15/2003 24.26 kg/m2         Blood Pressure from Last 3 " Encounters:   11/29/18 150/86   10/26/18 (!) 163/100   10/23/18 118/76    Weight from Last 3 Encounters:   11/29/18 68.2 kg (150 lb 4.8 oz)   10/26/18 68.5 kg (151 lb)   10/23/18 68.9 kg (152 lb)               Primary Care Provider Office Phone # Fax #    Matilde Christopher [phone]       [facility] [address] [place] [address]        Equal Access to Services     [facility]: Hadii aad ku hadasho Soomaali, waaxda luqadaha, qaybta kaalmada adeegyada, waxay sarain hayaan adelupe mackey. So [facility] [phone].    ATENCIÓN: Si habla español, tiene a manzo disposición servicios gratuitos de asistencia lingüística. [facility] [phone].    We comply with applicable federal civil rights laws and [place] laws. We do not discriminate on the basis of race, color, national origin, age, disability, sex, sexual orientation, or gender identity.            Thank you!     Thank you for choosing [facility]  for your care. Our goal is always to provide you with excellent care. Hearing back from our patients is one way we can continue to improve our services. Please take a few minutes to complete the written survey that you may receive in the mail after your visit with us. Thank you!             Your Updated Medication List - Protect others around you: Learn how to safely use, store and throw away your medicines at www.disposemymeds.org.          This list is accurate as of 11/29/18  8:54 AM.  Always use your most recent med list.                   Brand Name Dispense Instructions for use Diagnosis    acetaminophen 325 MG tablet    TYLENOL    30 tablet    Take 2 tablets (650 mg) by mouth every 4 hours as needed for other (mild pain)    Closed Colles' fracture of right radius, sequela       aspirin 81 MG EC tablet    ASA    30 tablet    Take 1 tablet (81 mg) by mouth daily    NSTEMI (non-ST elevated myocardial infarction) (H)       clopidogrel 75 MG  tablet    PLAVIX    30 tablet    Take 1 tablet (75 mg) by mouth daily    NSTEMI (non-ST elevated myocardial infarction) (H)       lisinopril 10 MG tablet    PRINIVIL/ZESTRIL    30 tablet    Take 1 tablet (10 mg) by mouth daily    NSTEMI (non-ST elevated myocardial infarction) (H)       metoprolol tartrate 25 MG tablet    LOPRESSOR    60 tablet    Take 1 tablet (25 mg) by mouth 2 times daily    NSTEMI (non-ST elevated myocardial infarction) (H)       nitroGLYcerin 0.4 MG sublingual tablet    NITROSTAT    25 tablet    For chest pain place 1 tablet under the tongue every 5 minutes for 3 doses. If symptoms persist 5 minutes after 1st dose call 911.    NSTEMI (non-ST elevated myocardial infarction) (H)

## 2018-11-30 ENCOUNTER — HOSPITAL ENCOUNTER (OUTPATIENT)
Dept: CARDIAC REHAB | Facility: CLINIC | Age: 58
End: 2018-11-30
Attending: STUDENT IN AN ORGANIZED HEALTH CARE EDUCATION/TRAINING PROGRAM
Payer: COMMERCIAL

## 2018-11-30 PROCEDURE — 40000116 ZZH STATISTIC OP CR VISIT

## 2018-11-30 PROCEDURE — 93798 PHYS/QHP OP CAR RHAB W/ECG: CPT

## 2018-12-03 ENCOUNTER — APPOINTMENT (OUTPATIENT)
Dept: GENERAL RADIOLOGY | Facility: CLINIC | Age: 58
End: 2018-12-03
Attending: EMERGENCY MEDICINE
Payer: COMMERCIAL

## 2018-12-03 ENCOUNTER — HOSPITAL ENCOUNTER (OUTPATIENT)
Dept: CARDIAC REHAB | Facility: CLINIC | Age: 58
End: 2018-12-03
Attending: STUDENT IN AN ORGANIZED HEALTH CARE EDUCATION/TRAINING PROGRAM
Payer: COMMERCIAL

## 2018-12-03 ENCOUNTER — HOSPITAL ENCOUNTER (EMERGENCY)
Facility: CLINIC | Age: 58
Discharge: HOME | End: 2018-12-03
Attending: EMERGENCY MEDICINE | Admitting: EMERGENCY MEDICINE
Payer: COMMERCIAL

## 2018-12-03 VITALS
BODY MASS INDEX: 24.25 KG/M2 | RESPIRATION RATE: 20 BRPM | TEMPERATURE: 98.4 F | HEART RATE: 65 BPM | DIASTOLIC BLOOD PRESSURE: 108 MMHG | OXYGEN SATURATION: 98 % | SYSTOLIC BLOOD PRESSURE: 179 MMHG | WEIGHT: 150.25 LBS

## 2018-12-03 DIAGNOSIS — J06.9 ACUTE URI: ICD-10-CM

## 2018-12-03 DIAGNOSIS — J02.9 ACUTE PHARYNGITIS, UNSPECIFIED ETIOLOGY: ICD-10-CM

## 2018-12-03 LAB
DEPRECATED S PYO AG THROAT QL EIA: NORMAL
FLUAV+FLUBV AG SPEC QL: NEGATIVE
FLUAV+FLUBV AG SPEC QL: NEGATIVE
INTERPRETATION ECG - MUSE: NORMAL
SPECIMEN SOURCE: NORMAL
SPECIMEN SOURCE: NORMAL

## 2018-12-03 PROCEDURE — 93005 ELECTROCARDIOGRAM TRACING: CPT

## 2018-12-03 PROCEDURE — 99285 EMERGENCY DEPT VISIT HI MDM: CPT | Mod: 25

## 2018-12-03 PROCEDURE — 40000116 ZZH STATISTIC OP CR VISIT: Performed by: OCCUPATIONAL THERAPIST

## 2018-12-03 PROCEDURE — 25000125 ZZHC RX 250: Performed by: EMERGENCY MEDICINE

## 2018-12-03 PROCEDURE — 87804 INFLUENZA ASSAY W/OPTIC: CPT | Performed by: EMERGENCY MEDICINE

## 2018-12-03 PROCEDURE — 87081 CULTURE SCREEN ONLY: CPT | Performed by: EMERGENCY MEDICINE

## 2018-12-03 PROCEDURE — 94640 AIRWAY INHALATION TREATMENT: CPT

## 2018-12-03 PROCEDURE — 93798 PHYS/QHP OP CAR RHAB W/ECG: CPT | Performed by: OCCUPATIONAL THERAPIST

## 2018-12-03 PROCEDURE — 71046 X-RAY EXAM CHEST 2 VIEWS: CPT

## 2018-12-03 PROCEDURE — 87880 STREP A ASSAY W/OPTIC: CPT | Performed by: EMERGENCY MEDICINE

## 2018-12-03 RX ORDER — ALBUTEROL SULFATE 0.83 MG/ML
2.5 SOLUTION RESPIRATORY (INHALATION) ONCE
Status: COMPLETED | OUTPATIENT
Start: 2018-12-03 | End: 2018-12-03

## 2018-12-03 RX ORDER — ALBUTEROL SULFATE 90 UG/1
2 AEROSOL, METERED RESPIRATORY (INHALATION) EVERY 6 HOURS PRN
Qty: 1 INHALER | Refills: 0 | Status: SHIPPED | OUTPATIENT
Start: 2018-12-03 | End: 2019-03-07

## 2018-12-03 RX ORDER — BENZONATATE 100 MG/1
100 CAPSULE ORAL 3 TIMES DAILY PRN
Qty: 42 CAPSULE | Refills: 0 | Status: SHIPPED | OUTPATIENT
Start: 2018-12-03 | End: 2019-03-07

## 2018-12-03 RX ADMIN — ALBUTEROL SULFATE 2.5 MG: 2.5 SOLUTION RESPIRATORY (INHALATION) at 04:38

## 2018-12-03 ASSESSMENT — ENCOUNTER SYMPTOMS
COUGH: 1
NAUSEA: 0
VOMITING: 0
DIAPHORESIS: 0
FEVER: 0
SHORTNESS OF BREATH: 1
CHILLS: 0
SORE THROAT: 1

## 2018-12-03 NOTE — ED PROVIDER NOTES
History     Chief Complaint:  Shortness of Breath    HPI   Desire Moise is a 58 year old female with a history of ACS, CAD, hypertension, and NSTEMI five weeks ago in October 2018 who presents to the emergency department today for evaluation of shortness of breath. The patient reports she has been experiencing a cough, sore throat, and shortness of breath for the past five days. She did have a fever five days ago but this has since resolved. Her cough and shortness of breath has persisted over the past five days and now has lost her voice. She was concerned about her symptoms prompting her visit to the emergency department. She denies chest pain, current fever, chills, urinary symptoms, nausea, vomiting, and diaphoresis.     Allergies:  No Known Drug Allergies    Medications:    aspirin 81 MG EC tablet  clopidogrel (PLAVIX) 75 MG tablet  lisinopril (PRINIVIL/ZESTRIL) 10 MG tablet  metoprolol tartrate (LOPRESSOR) 25 MG tablet  nitroGLYcerin (NITROSTAT) 0.4 MG sublingual tablet    Past Medical History:    ACS  Anemia  CAD  Diverticulosis  GERD  Hypertension  NSTEMI  Spina bifida occulta  Spontaneous dissection of coronary artery    Past Surgical History:    Gyn surgery  Hysterectomy  Incision of Hymen  Open reduction internal fixation wrist  Remove hardware upper extremity    Family History:    CABG  Lipids    Social History:  The patient was accompanied to the ED by .  Smoking Status: Never  Smokeless Tobacco: never  Alcohol Use: No  Marital Status:       Review of Systems   Constitutional: Negative for chills, diaphoresis and fever.   HENT: Positive for sore throat.    Respiratory: Positive for cough and shortness of breath.    Cardiovascular: Negative for chest pain.   Gastrointestinal: Negative for nausea and vomiting.   All other systems reviewed and are negative.        Physical Exam     Patient Vitals for the past 24 hrs:   BP Temp Temp src Pulse Resp SpO2 Weight   12/03/18 0410 (!) 178/116  98.4  F (36.9  C) Temporal 65 22 100 % 68.2 kg (150 lb 4 oz)         Physical Exam  Constitutional:  Oriented to person, place, and time. Well appearing. Non-toxic.   HENT:   Head:    Normocephalic.   Mouth/Throat:   Mild oral erythema. No exudate.   Eyes:    EOM are normal. Pupils are equal, round, and reactive to light.   Neck:    Neck supple.   Cardiovascular:  Normal rate, regular rhythm and normal heart sounds.      Exam reveals no gallop and no friction rub.       No murmur heard.  Pulmonary/Chest:  Effort normal and breath sounds normal.      No respiratory distress. No wheezes. No rales. Mild hoarse voice.      No reproducible chest wall pain.  Abdominal:   Soft. No distension. No tenderness. No rebound and no guarding.   Musculoskeletal:  Normal range of motion.   Neurological:   Alert and oriented to person, place, and time.           Moves all 4 extremities spontaneously    Skin:    No rash noted. No pallor.     Emergency Department Course   ECG:  Indication: shortness of breath  Completed at 0415.  Read at 0416.   Normal sinus rhythm  Normal ECG   Rate 66 bpm. NY interval 124. QRS duration 78. QT/QTc 410/429. P-R-T axes 43  66  90.    Imaging:  Radiology findings were communicated with the patient and family who voiced understanding of the findings.  Chest XR, PA and LAT  IMPRESSION: No acute abnormality.  Report per radiology     Laboratory:  Laboratory findings were communicated with the patient and family who voiced understanding of the findings.  Rapid strep screen: Negative   Influenza A/B Antigen: Negative    Beta Strep Group A Culture: Pending     Interventions:  0438 Albuterol 2.5 mg nebulization    Emergency Department Course:  Nursing notes and vitals reviewed.  The patient's nose was swabbed and this sample was sent for influenza screen, findings above.   The patient's throat was swabbed and this sample was sent for rapid strep screen, findings above.   The patient was sent for a chest XR, PA  and LAT while in the emergency department, results above.   0413: I performed an exam of the patient as documented above.   0500: Patient rechecked and updated.   Findings and plan explained to the Patient and spouse. Patient discharged home with instructions regarding supportive care, medications, and reasons to return. The importance of close follow-up was reviewed. The patient was prescribed Albuterol and Tessalon.   I personally reviewed the laboratory, imaging, and EKG results with the Patient and spouse and answered all related questions prior to discharge.    Impression & Plan    Medical Decision Making:  Desire Moise is a 58 year old female who presents for evaluation of cough, sore throat, and shortness of breath.  This is consistent with an upper respiratory tract infection.  There are no signs at this point of serious bacterial infection such as OM, RPA, epiglottitis, PTA, strep pharyngitis, pneumonia, sinusitis, meningitis, bacteremia.  Patient did complain of a sore throat, so rapid strep and influenza swabs were obtained and were negative. Chest x-ray was obtained given patient's history which was negative for acute findings. Given clear lungs, fever curve, no hypoxia and no respiratory distress, I do not feel patient needs additional imaging at this point. There are no gastrointestinal symptoms at this point and no signs of dehydration.  I have recommended returning to the ED with fever, shortness of breath, or concerning new or worse symptoms. I have recommended follow up in clinic in 1 week if not improving.    Diagnosis:    ICD-10-CM    1. Acute URI J06.9    2. Acute pharyngitis, unspecified etiology J02.9        Disposition:  discharged to home    Discharge Medications:  New Prescriptions    ALBUTEROL (PROAIR HFA/PROVENTIL HFA/VENTOLIN HFA) 108 (90 BASE) MCG/ACT INHALER    Inhale 2 puffs into the lungs every 6 hours as needed for shortness of breath / dyspnea or wheezing    BENZONATATE  (TESSALON) 100 MG CAPSULE    Take 1 capsule (100 mg) by mouth 3 times daily as needed         Scribe Disclosure:  I, Giovani Fermin, am serving as a scribe at 4:13 AM on 12/3/2018 to document services personally performed by Rajiv Salas MD based on my observations and the provider's statements to me.     12/3/2018   [facility] EMERGENCY DEPARTMENT       Rajiv Salas MD  12/03/18 0517

## 2018-12-03 NOTE — DISCHARGE INSTRUCTIONS
Discharge Instructions  Sore Throat  You were seen today for a sore throat.  Most sore throats are caused by a virus. Antibiotics do not help with viral infections, but you can fight off the virus on your own.  In this case, your sore throat would be treated with medications for your pain and fever.    Strep throat is a kind of sore throat caused by Group A streptococcus bacteria.  This type of sore throat is treated with antibiotics.  If you had a rapid test done today for strep throat and it did not show infection, we always do a culture. If the culture shows you have strep throat, we will call you and get you a prescription for antibiotics.    Return to the Emergency Department if:    If you have difficulty breathing.    If you are drooling because you are unable to swallow.    You become dehydrated due to difficulty drinking. Signs of dehydration include weakness, dry mouth, and urinating less than 3 times per day.    If you develop swelling of the neck or tongue.    If you develop a high fever with either headache or stiff neck.    Treatment:      Pain relief -- Non-prescription pain medications, such as Tylenol  (acetaminophen) or Motrin , Advil  (ibuprofen) are usually recommended for pain.  Do not use a medicine that you are allergic to, or if your doctor has told you not to use it.   If you have been given a narcotic such as Vicodin  (hydrocodone with acetaminophen), Percocet  (oxycodone with acetaminophen), codeine, do not drive for four hours after you have taken it.  If the narcotic contains Tylenol  (acetaminophen), do not take Tylenol  with it. All narcotics will cause constipation, so eat a high fiber diet.      If you have been placed on antibiotics, watch for signs of allergic reaction.  These include rash, lip swelling, difficulty breathing, wheezing, and dizziness.  If you develop any of these symptoms, stop the antibiotic immediately and go to an Emergency Department or Urgent Care for  "evaluation.    Probiotics: If you have been given an antibiotic, you may want to also take a probiotic pill or eat yogurt with live cultures. Probiotics have \"good bacteria\" to help your intestines stay healthy. Studies have shown that probiotics help prevent diarrhea and other intestine problems (including C. diff infection) when you take antibiotics. You can buy these without a prescription in the pharmacy section of the store.   If you were given a prescription for medicine here today, be sure to read all of the information (including the package insert) that comes with your prescription.  This will include important information about the medicine, its side effects, and any warnings that you need to know about.  The pharmacist who fills the prescription can provide more information and answer questions you may have about the medicine.  If you have questions or concerns that the pharmacist cannot address, please call or return to the Emergency Department.           Opioid Medication Information    Pain medications are among the most commonly prescribed medicines, so we are including this information for all our patients. If you did not receive pain medication or get a prescription for pain medicine, you can ignore it.     You may have been given a prescription for an opioid (narcotic) pain medicine and/or have received a pain medicine while here in the Emergency Department. These medicines can make you drowsy or impaired. You must not drive, operate dangerous equipment, or engage in any other dangerous activities while taking these medications. If you drive while taking these medications, you could be arrested for DUI, or driving under the influence. Do not drink any alcohol while you are taking these medications.     Opioid pain medications can cause addiction. If you have a history of chemical dependency of any type, you are at a higher risk of becoming addicted to pain medications.  Only take these prescribed " medications to treat your pain when all other options have been tried. Take it for as short a time and as few doses as possible. Store your pain pills in a secure place, as they are frequently stolen and provide a dangerous opportunity for children or visitors in your house to start abusing these powerful medications. We will not replace any lost or stolen medicine.  As soon as your pain is better, you should flush all your remaining medication.     Many prescription pain medications contain Tylenol  (acetaminophen), including Vicodin , Tylenol #3 , Norco , Lortab , and Percocet .  You should not take any extra pills of Tylenol  if you are using these prescription medications or you can get very sick.  Do not ever take more than 3000 mg of acetaminophen in any 24 hour period.    All opioids tend to cause constipation. Drink plenty of water and eat foods that have a lot of fiber, such as fruits, vegetables, prune juice, apple juice and high fiber cereal.  Take a laxative if you don t move your bowels at least every other day. Miralax , Milk of Magnesia, Colace , or Senna  can be used to keep you regular.      Remember that you can always come back to the Emergency Department if you are not able to see your regular doctor in the amount of time listed above, if you get any new symptoms, or if there is anything that worries you.      Discharge Instructions  Upper Respiratory Infection    The upper respiratory tract includes the sinuses, nasal passages, pharynx, and larynx. A URI, or upper respiratory infection, is an infection of any of the parts of the upper airway. Symptoms include runny nose, congestion, sore throat, cough, and fever. URIs are almost always caused by a virus. Antibiotics do not help with virus infections, so are not used for an ordinary URI. A URI is very contagious through coughing and nasal secretions; make sure you wash your hands often and clean surfaces after sneezing, coughing or touching them.   Viruses can live on surfaces for up to 3 days.      Return to the Emergency Department if:    Any of the symptoms you have get much worse.    You seem very sick, like being too weak to get up.    You have any new symptoms, especially serious things like chest pain.     You are short of breath.     You have a severe headache.    You are vomiting so much you can t keep fluids or medicines down.    You have confusion or seem unusually drowsy.    You have a seizure or convulsion.    Follow-up:      You should start to improve in 3 - 5 days.  A cough can linger for up to six weeks, but overall you should be feeling much better.  See your doctor if you have a fever for more than 3 days, or if you are not feeling better within 5 days.      What can I do to help myself?    Fill any prescriptions the doctor gave you and take them right away    If you have a fever, get plenty of rest and drink lots of fluids, especially water. Using a humidifier or saline nose spray will also help loosen secretions.     What clothes or blankets you have on won t change your fever. Do what is comfortable for you.    Bathing or sponging in lukewarm water may help you feel better.    Tylenol  (acetaminophen), Motrin  (ibuprofen), or Advil  (ibuprofen) help bring fever down and may help you feel more comfortable. Be sure to read and follow the package directions, and ask your doctor if you have questions.    Do not drink alcohol.    Decongestants may help you feel better. You may use decongestant nose sprays Afrin  (oxymetazoline) or Golden-Synephrine  (phenylephrine hydrochloride) for up to 3 days, or may use a decongestant tablet like Sudafed  (pseudoephedrine).  If you were given a prescription for medicine here today, be sure to read all of the information (including the package insert) that comes with your prescription.  This will include important information about the medicine, its side effects, and any warnings that you need to know about.   The pharmacist who fills the prescription can provide more information and answer questions you may have about the medicine.  If you have questions or concerns that the pharmacist cannot address, please call or return to the Emergency Department.   Opioid Medication Information    Pain medications are among the most commonly prescribed medicines, so we are including this information for all our patients. If you did not receive pain medication or get a prescription for pain medicine, you can ignore it.     You may have been given a prescription for an opioid (narcotic) pain medicine and/or have received a pain medicine while here in the Emergency Department. These medicines can make you drowsy or impaired. You must not drive, operate dangerous equipment, or engage in any other dangerous activities while taking these medications. If you drive while taking these medications, you could be arrested for DUI, or driving under the influence. Do not drink any alcohol while you are taking these medications.     Opioid pain medications can cause addiction. If you have a history of chemical dependency of any type, you are at a higher risk of becoming addicted to pain medications.  Only take these prescribed medications to treat your pain when all other options have been tried. Take it for as short a time and as few doses as possible. Store your pain pills in a secure place, as they are frequently stolen and provide a dangerous opportunity for children or visitors in your house to start abusing these powerful medications. We will not replace any lost or stolen medicine.  As soon as your pain is better, you should flush all your remaining medication.     Many prescription pain medications contain Tylenol  (acetaminophen), including Vicodin , Tylenol #3 , Norco , Lortab , and Percocet .  You should not take any extra pills of Tylenol  if you are using these prescription medications or you can get very sick.  Do not ever take more  than 3000 mg of acetaminophen in any 24 hour period.    All opioids tend to cause constipation. Drink plenty of water and eat foods that have a lot of fiber, such as fruits, vegetables, prune juice, apple juice and high fiber cereal.  Take a laxative if you don t move your bowels at least every other day. Miralax , Milk of Magnesia, Colace , or Senna  can be used to keep you regular.      Remember that you can always come back to the Emergency Department if you are not able to see your regular doctor in the amount of time listed above, if you get any new symptoms, or if there is anything that worries you.

## 2018-12-03 NOTE — ED AVS SNAPSHOT
[facility]    [address]    [facility] [address]    Phone:  [phone]    Fax:  [phone]                                       Desire Moise   MRN: [id number]    Department:  [facility]   Date of Visit:  12/3/2018           After Visit Summary Signature Page     I have received my discharge instructions, and my questions have been answered. I have discussed any challenges I see with this plan with the nurse or doctor.    ..........................................................................................................................................  Patient/Patient Representative Signature      ..........................................................................................................................................  Patient Representative Print Name and Relationship to Patient    ..................................................               ................................................  Date                                   Time    ..........................................................................................................................................  Reviewed by Signature/Title    ...................................................              ..............................................  Date                                               Time          22EPIC Rev 08/18

## 2018-12-03 NOTE — ED AVS SNAPSHOT
[facility]    [address] [place] [address]    Phone:  [phone]    Fax:  [phone]                                       Desire Moise   MRN: [id number]    Department:  [facility]   Date of Visit:  12/3/2018           Patient Information     Date Of Birth          1960        Your diagnoses for this visit were:     Acute URI     Acute pharyngitis, unspecified etiology        You were seen by Rajiv Salas MD.      Follow-up Information     Follow up with Matilde Christopher. Call in 3 days.    Specialty:  Internal Medicine    Contact information:    [facility]  [address] [place] [address]  [phone]          Discharge Instructions       Discharge Instructions  Sore Throat  You were seen today for a sore throat.  Most sore throats are caused by a virus. Antibiotics do not help with viral infections, but you can fight off the virus on your own.  In this case, your sore throat would be treated with medications for your pain and fever.    Strep throat is a kind of sore throat caused by Group A streptococcus bacteria.  This type of sore throat is treated with antibiotics.  If you had a rapid test done today for strep throat and it did not show infection, we always do a culture. If the culture shows you have strep throat, we will call you and get you a prescription for antibiotics.    Return to the Emergency Department if:    If you have difficulty breathing.    If you are drooling because you are unable to swallow.    You become dehydrated due to difficulty drinking. Signs of dehydration include weakness, dry mouth, and urinating less than 3 times per day.    If you develop swelling of the neck or tongue.    If you develop a high fever with either headache or stiff neck.    Treatment:      Pain relief -- Non-prescription pain medications, such as Tylenol  (acetaminophen) or Motrin , Advil   "(ibuprofen) are usually recommended for pain.  Do not use a medicine that you are allergic to, or if your doctor has told you not to use it.   If you have been given a narcotic such as Vicodin  (hydrocodone with acetaminophen), Percocet  (oxycodone with acetaminophen), codeine, do not drive for four hours after you have taken it.  If the narcotic contains Tylenol  (acetaminophen), do not take Tylenol  with it. All narcotics will cause constipation, so eat a high fiber diet.      If you have been placed on antibiotics, watch for signs of allergic reaction.  These include rash, lip swelling, difficulty breathing, wheezing, and dizziness.  If you develop any of these symptoms, stop the antibiotic immediately and go to an Emergency Department or Urgent Care for evaluation.    Probiotics: If you have been given an antibiotic, you may want to also take a probiotic pill or eat yogurt with live cultures. Probiotics have \"good bacteria\" to help your intestines stay healthy. Studies have shown that probiotics help prevent diarrhea and other intestine problems (including C. diff infection) when you take antibiotics. You can buy these without a prescription in the pharmacy section of the store.   If you were given a prescription for medicine here today, be sure to read all of the information (including the package insert) that comes with your prescription.  This will include important information about the medicine, its side effects, and any warnings that you need to know about.  The pharmacist who fills the prescription can provide more information and answer questions you may have about the medicine.  If you have questions or concerns that the pharmacist cannot address, please call or return to the Emergency Department.           Opioid Medication Information    Pain medications are among the most commonly prescribed medicines, so we are including this information for all our patients. If you did not receive pain medication " or get a prescription for pain medicine, you can ignore it.     You may have been given a prescription for an opioid (narcotic) pain medicine and/or have received a pain medicine while here in the Emergency Department. These medicines can make you drowsy or impaired. You must not drive, operate dangerous equipment, or engage in any other dangerous activities while taking these medications. If you drive while taking these medications, you could be arrested for DUI, or driving under the influence. Do not drink any alcohol while you are taking these medications.     Opioid pain medications can cause addiction. If you have a history of chemical dependency of any type, you are at a higher risk of becoming addicted to pain medications.  Only take these prescribed medications to treat your pain when all other options have been tried. Take it for as short a time and as few doses as possible. Store your pain pills in a secure place, as they are frequently stolen and provide a dangerous opportunity for children or visitors in your house to start abusing these powerful medications. We will not replace any lost or stolen medicine.  As soon as your pain is better, you should flush all your remaining medication.     Many prescription pain medications contain Tylenol  (acetaminophen), including Vicodin , Tylenol #3 , Norco , Lortab , and Percocet .  You should not take any extra pills of Tylenol  if you are using these prescription medications or you can get very sick.  Do not ever take more than 3000 mg of acetaminophen in any 24 hour period.    All opioids tend to cause constipation. Drink plenty of water and eat foods that have a lot of fiber, such as fruits, vegetables, prune juice, apple juice and high fiber cereal.  Take a laxative if you don t move your bowels at least every other day. Miralax , Milk of Magnesia, Colace , or Senna  can be used to keep you regular.      Remember that you can always come back to the Emergency  Department if you are not able to see your regular doctor in the amount of time listed above, if you get any new symptoms, or if there is anything that worries you.      Discharge Instructions  Upper Respiratory Infection    The upper respiratory tract includes the sinuses, nasal passages, pharynx, and larynx. A URI, or upper respiratory infection, is an infection of any of the parts of the upper airway. Symptoms include runny nose, congestion, sore throat, cough, and fever. URIs are almost always caused by a virus. Antibiotics do not help with virus infections, so are not used for an ordinary URI. A URI is very contagious through coughing and nasal secretions; make sure you wash your hands often and clean surfaces after sneezing, coughing or touching them.  Viruses can live on surfaces for up to 3 days.      Return to the Emergency Department if:    Any of the symptoms you have get much worse.    You seem very sick, like being too weak to get up.    You have any new symptoms, especially serious things like chest pain.     You are short of breath.     You have a severe headache.    You are vomiting so much you can t keep fluids or medicines down.    You have confusion or seem unusually drowsy.    You have a seizure or convulsion.    Follow-up:      You should start to improve in 3 - 5 days.  A cough can linger for up to six weeks, but overall you should be feeling much better.  See your doctor if you have a fever for more than 3 days, or if you are not feeling better within 5 days.      What can I do to help myself?    Fill any prescriptions the doctor gave you and take them right away    If you have a fever, get plenty of rest and drink lots of fluids, especially water. Using a humidifier or saline nose spray will also help loosen secretions.     What clothes or blankets you have on won t change your fever. Do what is comfortable for you.    Bathing or sponging in lukewarm water may help you feel  better.    Tylenol  (acetaminophen), Motrin  (ibuprofen), or Advil  (ibuprofen) help bring fever down and may help you feel more comfortable. Be sure to read and follow the package directions, and ask your doctor if you have questions.    Do not drink alcohol.    Decongestants may help you feel better. You may use decongestant nose sprays Afrin  (oxymetazoline) or Golden-Synephrine  (phenylephrine hydrochloride) for up to 3 days, or may use a decongestant tablet like Sudafed  (pseudoephedrine).  If you were given a prescription for medicine here today, be sure to read all of the information (including the package insert) that comes with your prescription.  This will include important information about the medicine, its side effects, and any warnings that you need to know about.  The pharmacist who fills the prescription can provide more information and answer questions you may have about the medicine.  If you have questions or concerns that the pharmacist cannot address, please call or return to the Emergency Department.   Opioid Medication Information    Pain medications are among the most commonly prescribed medicines, so we are including this information for all our patients. If you did not receive pain medication or get a prescription for pain medicine, you can ignore it.     You may have been given a prescription for an opioid (narcotic) pain medicine and/or have received a pain medicine while here in the Emergency Department. These medicines can make you drowsy or impaired. You must not drive, operate dangerous equipment, or engage in any other dangerous activities while taking these medications. If you drive while taking these medications, you could be arrested for DUI, or driving under the influence. Do not drink any alcohol while you are taking these medications.     Opioid pain medications can cause addiction. If you have a history of chemical dependency of any type, you are at a higher risk of becoming  addicted to pain medications.  Only take these prescribed medications to treat your pain when all other options have been tried. Take it for as short a time and as few doses as possible. Store your pain pills in a secure place, as they are frequently stolen and provide a dangerous opportunity for children or visitors in your house to start abusing these powerful medications. We will not replace any lost or stolen medicine.  As soon as your pain is better, you should flush all your remaining medication.     Many prescription pain medications contain Tylenol  (acetaminophen), including Vicodin , Tylenol #3 , Norco , Lortab , and Percocet .  You should not take any extra pills of Tylenol  if you are using these prescription medications or you can get very sick.  Do not ever take more than 3000 mg of acetaminophen in any 24 hour period.    All opioids tend to cause constipation. Drink plenty of water and eat foods that have a lot of fiber, such as fruits, vegetables, prune juice, apple juice and high fiber cereal.  Take a laxative if you don t move your bowels at least every other day. Miralax , Milk of Magnesia, Colace , or Senna  can be used to keep you regular.      Remember that you can always come back to the Emergency Department if you are not able to see your regular doctor in the amount of time listed above, if you get any new symptoms, or if there is anything that worries you.          Your next 10 appointments already scheduled     Dec 03, 2018 10:00 AM CST   Cardiac Treatment with Rh Cardiac Rehab 1   [facility] ([facility])    [address]  [facility] [address]   [phone]            Dec 05, 2018 10:00 AM CST   Cardiac Treatment with Rh Cardiac Rehab 1   [facility] ([facility])    [address]  [facility] [address]   [phone]            Dec 07, 2018 10:00 AM CST   Cardiac Treatment with Rh  Cardiac Rehab 1   [facility] ([facility])    [address]  [facility] [address]   [phone]            Dec 10, 2018 10:00 AM CST   Cardiac Treatment with Rh Cardiac Rehab 1   [facility] ([facility])    [address]  [facility] [address]   [phone]            Dec 12, 2018 10:00 AM CST   Cardiac Treatment with Rh Cardiac Rehab 1   [facility] ([facility])    [address]  [facility] [address]   [phone]            Dec 14, 2018 10:00 AM CST   Cardiac Treatment with Rh Cardiac Rehab 1   [facility] ([facility])    [address]  [facility] [address]   [phone]            Dec 17, 2018 10:00 AM CST   Cardiac Treatment with Rh Cardiac Rehab 1   [facility] ([facility])    [address]  [facility] [address]   [phone]            Dec 19, 2018  9:30 AM CST   Cardiac Discharge with Rh Cardiac Rehab 2   [facility] ([facility])    [address]  [facility] [address]   [phone]            Jan 14, 2019  9:45 AM CST   Echo Complete with [id number]   [facility] ([facility])    [address]  [facility] [address]   [phone]           1. Please bring or wear a comfortable two-piece outfit. 2. You may eat, drink and take your normal medicines. 3. For any questions that cannot be answered, please contact the ordering physician              24 Hour Appointment Hotline       To make an appointment at any [facility] clinic, call [phone] ([phone]). If you don't have a family doctor or clinic, we will help you find one. [facility] clinics are conveniently located to serve the needs of you and your family.              Review of your medicines      START taking        Dose / Directions Last dose taken    albuterol 108 (90 Base) MCG/ACT inhaler   Commonly known as:  PROAIR HFA/PROVENTIL HFA/VENTOLIN HFA   Dose:  2 puff   Quantity:  1 Inhaler        Inhale 2 puffs into the lungs every 6 hours as needed for shortness of breath / dyspnea or wheezing   Refills:  0        benzonatate 100 MG capsule   Commonly known as:  TESSALON   Dose:  100 mg   Quantity:  42 capsule        Take 1 capsule (100 mg) by mouth 3 times daily as needed   Refills:  0          Our records show that you are taking the medicines listed below. If these are incorrect, please call your family doctor or clinic.        Dose / Directions Last dose taken    acetaminophen 325 MG tablet   Commonly known as:  TYLENOL   Dose:  650 mg   Quantity:  30 tablet        Take 2 tablets (650 mg) by mouth every 4 hours as needed for other (mild pain)   Refills:  0        aspirin 81 MG EC tablet   Commonly known as:  ASA   Dose:  81 mg   Quantity:  30 tablet        Take 1 tablet (81 mg) by mouth daily   Refills:  1        clopidogrel 75 MG tablet   Commonly known as:  PLAVIX   Dose:  75 mg   Quantity:  30 tablet        Take 1 tablet (75 mg) by mouth daily   Refills:  3        lisinopril 10 MG tablet   Commonly known as:  PRINIVIL/ZESTRIL   Dose:  10 mg   Quantity:  30 tablet        Take 1 tablet (10 mg) by mouth daily   Refills:  3        metoprolol tartrate 25 MG tablet   Commonly known as:  LOPRESSOR   Dose:  25 mg   Quantity:  60 tablet        Take 1 tablet (25 mg) by mouth 2 times daily   Refills:  3        nitroGLYcerin 0.4 MG sublingual tablet   Commonly known as:  NITROSTAT   Quantity:  25 tablet        For chest pain place 1 tablet under the tongue every 5 minutes for 3 doses. If symptoms persist 5 minutes after 1st dose call 911.   Refills:  0                Prescriptions were sent or printed at these locations (2 Prescriptions)                   Other Prescriptions                 Printed at Department/Unit printer (2 of 2)         albuterol (PROAIR HFA/PROVENTIL HFA/VENTOLIN HFA) 108 (90 Base) MCG/ACT inhaler               benzonatate (TESSALON) 100 MG capsule                Procedures and tests performed during your visit     Beta strep group A culture    Chest XR,  PA & LAT    EKG 12 lead    Influenza A/B antigen    Rapid strep screen      Orders Needing Specimen Collection     None      Pending Results     Date and Time Order Name Status Description    12/3/2018 0434 Beta strep group A culture In process     12/3/2018 0418 Chest XR,  PA & LAT Preliminary             Pending Culture Results     Date and Time Order Name Status Description    12/3/2018 0434 Beta strep group A culture In process             Pending Results Instructions     If you had any lab results that were not finalized at the time of your Discharge, you can call the ED Lab Result RN at [phone]. You will be contacted by this team for any positive Lab results or changes in treatment. The nurses are available 7 days a week from 10A to 6:30P.  You can leave a message 24 hours per day and they will return your call.        Test Results From Your Hospital Stay        12/3/2018  4:49 AM      Component Results     Component Value Ref Range & Units Status    Influenza A/B Agn Specimen Nasal  Final    Influenza A Negative NEG^Negative Final    Influenza B Negative NEG^Negative Final    Test results must be correlated with clinical data. If necessary, results   should be confirmed by a molecular assay or viral culture.           12/3/2018  4:58 AM      Component Results     Component    Specimen Description    Throat    Rapid Strep A Screen    NEGATIVE: No Group A streptococcal antigen detected by immunoassay, await culture report.         12/3/2018  4:39 AM      Narrative     XR CHEST 2 VIEWS   12/3/2018 4:28 AM     HISTORY: Cough.     COMPARISON: 10/13/2018.    FINDINGS: The heart size is normal. The lungs are  clear. No  pneumothorax or pleural effusion.        Impression     IMPRESSION: No acute abnormality.         12/3/2018  4:59 AM                Clinical Quality Measure: Blood Pressure Screening     Your blood pressure was checked while you were in the emergency department today. The last reading we obtained was  BP: (!) 178/116 . Please read the guidelines below about what these numbers mean and what you should do about them.  If your systolic blood pressure (the top number) is less than 120 and your diastolic blood pressure (the bottom number) is less than 80, then your blood pressure is normal. There is nothing more that you need to do about it.  If your systolic blood pressure (the top number) is 120-139 or your diastolic blood pressure (the bottom number) is 80-89, your blood pressure may be higher than it should be. You should have your blood pressure rechecked within a year by a primary care provider.  If your systolic blood pressure (the top number) is 140 or greater or your diastolic blood pressure (the bottom number) is 90 or greater, you may have high blood pressure. High blood pressure is treatable, but if left untreated over time it can put you at risk for heart attack, stroke, or kidney failure. You should have your blood pressure rechecked by a primary care provider within the next 4 weeks.  If your provider in the emergency department today gave you specific instructions to follow-up with your doctor or provider even sooner than that, you should follow that instruction and not wait for up to 4 weeks for your follow-up visit.        Thank you for choosing [facility]       Thank you for choosing [facility] for your care. Our goal is always to provide you with excellent care. Hearing back from our patients is one way we can continue to improve our services. Please take a few minutes to complete the written survey that you may receive in the mail after you visit with us. Thank you!        Care EveryWhere ID      This is your Care EveryWhere ID. This could be used by other organizations to access your [place] medical records  [id number]        Equal Access to Services     FRANK ALBERT : Dc Moses, will linn, sabrina ruiz, navin mackey. So [facility] [phone].    ATENCIÓN: Si habla español, tiene a manzo disposición servicios gratuitos de asistencia lingüística. Llame al [phone].    We comply with applicable federal civil rights laws and [place] laws. We do not discriminate on the basis of race, color, national origin, age, disability, sex, sexual orientation, or gender identity.            After Visit Summary       This is your record. Keep this with you and show to your community pharmacist(s) and doctor(s) at your next visit.

## 2018-12-03 NOTE — ED TRIAGE NOTES
58-year-old female presents to the ER with complaints of shortness of breath and chest congestion. Pt states she hasn't been feeling well for the last week. Pt is worried she has pneumonia.

## 2018-12-05 ENCOUNTER — HOSPITAL ENCOUNTER (OUTPATIENT)
Dept: CARDIAC REHAB | Facility: CLINIC | Age: 58
End: 2018-12-05
Attending: STUDENT IN AN ORGANIZED HEALTH CARE EDUCATION/TRAINING PROGRAM
Payer: COMMERCIAL

## 2018-12-05 LAB
BACTERIA SPEC CULT: NORMAL
Lab: NORMAL
SPECIMEN SOURCE: NORMAL

## 2018-12-05 PROCEDURE — 93798 PHYS/QHP OP CAR RHAB W/ECG: CPT

## 2018-12-05 PROCEDURE — 40000116 ZZH STATISTIC OP CR VISIT

## 2018-12-06 ENCOUNTER — TELEPHONE (OUTPATIENT)
Dept: CARDIOLOGY | Facility: CLINIC | Age: 58
End: 2018-12-06

## 2018-12-06 NOTE — TELEPHONE ENCOUNTER
Call from KERRY Muhammad with [facility], who saw the patient today for rectal bleeding. Dilia is calling to clarify some medications that the patient is taking. Dilia is inquiring about which medications were changed or discontinued. Reviewed Dr. Zuleta's last OV note from 11/29/18:    ASSESSMENT:   1.  Desire Moise is a pleasant 58-year-old female who was hospitalized 10/13 for chest discomfort and a non-ST elevation MI.  Coronary angiography was consistent with coronary artery dissection in the first obtuse marginal branch artery.  We reviewed her previous testing to rule out any fibromuscular dysplasia.  An MRI of her head and neck in 2015 was normal.  A previous CT scan of her chest and abdomen and pelvis did not show any vascular abnormalities either.  She is currently stable.  She is due to discontinue her cardiac rehab in December.  She will continue to exercise on her own.  I have given her reassurance.   2.  We will obtain an echocardiogram in 2 months to assess left ventricular function.  If this is normal we will continue with her current medications for a year.  Her clopidogrel and lisinopril can then be discontinued.  Metoprolol I would continue for at least 3 years along with long-term aspirin therapy.   3.  The patient asks if she can do some light lifting.  Her grandchild weighs 16 pounds and I have suggested this would be fine.  She should also do some light weight work both in cardiac rehab and at home.  I would continue a lifting restriction of about 20-25 pounds for now.       It is my pleasure to assist in the care of Desire Moise.  I will review her echocardiogram in January.  I will see the patient again in 6 months or earlier on a p.r.n. basis.  Her  was in attendance today.  All their questions were answered to their satisfaction.       Juan Carlos Zuleta MD     Reviewed with Dilia that Dr. Zuleta would like the patient to have an echocardiogram in 2 months and then  discontinue plavix and lisinopril once the echocardiogram is reviewed. Dilia is requesting that Dr. Zuleta be notified of the patient's current bleeding issue and provide any insight he may have regarding her plavix. Dilia states that her note should be available for review in Care Everywhere. Will route to Dr. Zuleta for review.

## 2018-12-07 ENCOUNTER — HOSPITAL ENCOUNTER (OUTPATIENT)
Dept: CARDIAC REHAB | Facility: CLINIC | Age: 58
End: 2018-12-07
Attending: STUDENT IN AN ORGANIZED HEALTH CARE EDUCATION/TRAINING PROGRAM
Payer: COMMERCIAL

## 2018-12-07 PROCEDURE — 40000116 ZZH STATISTIC OP CR VISIT

## 2018-12-07 PROCEDURE — 93798 PHYS/QHP OP CAR RHAB W/ECG: CPT

## 2018-12-07 NOTE — TELEPHONE ENCOUNTER
Tried to call Dilia PAYNE at [facility].  Left message with team 4 direct number to call back.

## 2018-12-07 NOTE — TELEPHONE ENCOUNTER
Gillian ARCE from [facility] called back. Informed Gillian of Dr. Zuleta's recommendation. Gillian stated that she would update Dilia PAYNE. Dilia has team 4 direct number to call with any questions.

## 2018-12-07 NOTE — TELEPHONE ENCOUNTER
If rectal bleeding is severe or ongoing, I would stop the Plavix now and continue ASA.  Juan Carlos Zuleta MD, FACC  December 7, 2018 10:47 AM

## 2018-12-10 ENCOUNTER — HOSPITAL ENCOUNTER (OUTPATIENT)
Dept: CARDIAC REHAB | Facility: CLINIC | Age: 58
End: 2018-12-10
Attending: STUDENT IN AN ORGANIZED HEALTH CARE EDUCATION/TRAINING PROGRAM
Payer: COMMERCIAL

## 2018-12-10 PROCEDURE — 40000116 ZZH STATISTIC OP CR VISIT: Performed by: REHABILITATION PRACTITIONER

## 2018-12-10 PROCEDURE — 93798 PHYS/QHP OP CAR RHAB W/ECG: CPT | Performed by: REHABILITATION PRACTITIONER

## 2018-12-12 ENCOUNTER — HOSPITAL ENCOUNTER (OUTPATIENT)
Dept: CARDIAC REHAB | Facility: CLINIC | Age: 58
End: 2018-12-12
Attending: STUDENT IN AN ORGANIZED HEALTH CARE EDUCATION/TRAINING PROGRAM
Payer: COMMERCIAL

## 2018-12-12 PROCEDURE — 93798 PHYS/QHP OP CAR RHAB W/ECG: CPT | Performed by: OCCUPATIONAL THERAPIST

## 2018-12-12 PROCEDURE — 40000116 ZZH STATISTIC OP CR VISIT: Performed by: OCCUPATIONAL THERAPIST

## 2018-12-14 ENCOUNTER — HOSPITAL ENCOUNTER (OUTPATIENT)
Dept: CARDIAC REHAB | Facility: CLINIC | Age: 58
End: 2018-12-14
Attending: STUDENT IN AN ORGANIZED HEALTH CARE EDUCATION/TRAINING PROGRAM
Payer: COMMERCIAL

## 2018-12-14 PROCEDURE — 40000116 ZZH STATISTIC OP CR VISIT: Performed by: OCCUPATIONAL THERAPIST

## 2018-12-14 PROCEDURE — 93798 PHYS/QHP OP CAR RHAB W/ECG: CPT | Performed by: OCCUPATIONAL THERAPIST

## 2018-12-17 ENCOUNTER — HOSPITAL ENCOUNTER (OUTPATIENT)
Dept: CARDIAC REHAB | Facility: CLINIC | Age: 58
End: 2018-12-17
Attending: STUDENT IN AN ORGANIZED HEALTH CARE EDUCATION/TRAINING PROGRAM
Payer: COMMERCIAL

## 2018-12-17 PROCEDURE — 40000116 ZZH STATISTIC OP CR VISIT

## 2018-12-17 PROCEDURE — 93798 PHYS/QHP OP CAR RHAB W/ECG: CPT

## 2018-12-19 ENCOUNTER — HOSPITAL ENCOUNTER (OUTPATIENT)
Dept: CARDIAC REHAB | Facility: CLINIC | Age: 58
End: 2018-12-19
Attending: STUDENT IN AN ORGANIZED HEALTH CARE EDUCATION/TRAINING PROGRAM
Payer: COMMERCIAL

## 2018-12-19 PROCEDURE — 40000116 ZZH STATISTIC OP CR VISIT

## 2018-12-19 PROCEDURE — 93798 PHYS/QHP OP CAR RHAB W/ECG: CPT

## 2018-12-19 PROCEDURE — 40000575 ZZH STATISTIC OP CARDIAC VISIT #2

## 2018-12-19 PROCEDURE — 93797 PHYS/QHP OP CAR RHAB WO ECG: CPT | Mod: XU

## 2019-01-09 ENCOUNTER — APPOINTMENT (OUTPATIENT)
Dept: GENERAL RADIOLOGY | Facility: CLINIC | Age: 59
End: 2019-01-09
Payer: COMMERCIAL

## 2019-01-09 ENCOUNTER — HOSPITAL ENCOUNTER (EMERGENCY)
Facility: CLINIC | Age: 59
Discharge: HOME | End: 2019-01-09
Attending: EMERGENCY MEDICINE | Admitting: EMERGENCY MEDICINE
Payer: COMMERCIAL

## 2019-01-09 ENCOUNTER — TELEPHONE (OUTPATIENT)
Dept: CARDIOLOGY | Facility: CLINIC | Age: 59
End: 2019-01-09

## 2019-01-09 VITALS
OXYGEN SATURATION: 98 % | WEIGHT: 151.01 LBS | SYSTOLIC BLOOD PRESSURE: 167 MMHG | BODY MASS INDEX: 24.37 KG/M2 | HEART RATE: 69 BPM | DIASTOLIC BLOOD PRESSURE: 97 MMHG | RESPIRATION RATE: 16 BRPM | TEMPERATURE: 98.7 F

## 2019-01-09 DIAGNOSIS — I10 ESSENTIAL HYPERTENSION, BENIGN: ICD-10-CM

## 2019-01-09 DIAGNOSIS — M54.6 BILATERAL THORACIC BACK PAIN, UNSPECIFIED CHRONICITY: ICD-10-CM

## 2019-01-09 DIAGNOSIS — R00.2 PALPITATIONS: ICD-10-CM

## 2019-01-09 LAB
ALBUMIN UR-MCNC: NEGATIVE MG/DL
ANION GAP SERPL CALCULATED.3IONS-SCNC: 6 MMOL/L (ref 3–14)
APPEARANCE UR: CLEAR
BACTERIA #/AREA URNS HPF: ABNORMAL /HPF
BASOPHILS # BLD AUTO: 0.1 10E9/L (ref 0–0.2)
BASOPHILS NFR BLD AUTO: 1.1 %
BILIRUB UR QL STRIP: NEGATIVE
BUN SERPL-MCNC: 20 MG/DL (ref 7–30)
CALCIUM SERPL-MCNC: 8.8 MG/DL (ref 8.5–10.1)
CHLORIDE SERPL-SCNC: 107 MMOL/L (ref 94–109)
CO2 SERPL-SCNC: 26 MMOL/L (ref 20–32)
COLOR UR AUTO: YELLOW
CREAT SERPL-MCNC: 0.8 MG/DL (ref 0.52–1.04)
DIFFERENTIAL METHOD BLD: ABNORMAL
EOSINOPHIL # BLD AUTO: 0.6 10E9/L (ref 0–0.7)
EOSINOPHIL NFR BLD AUTO: 7.1 %
ERYTHROCYTE [DISTWIDTH] IN BLOOD BY AUTOMATED COUNT: 13.2 % (ref 10–15)
GFR SERPL CREATININE-BSD FRML MDRD: 81 ML/MIN/{1.73_M2}
GLUCOSE SERPL-MCNC: 91 MG/DL (ref 70–99)
GLUCOSE UR STRIP-MCNC: NEGATIVE MG/DL
HCT VFR BLD AUTO: 42.8 % (ref 35–47)
HGB BLD-MCNC: 13.2 G/DL (ref 11.7–15.7)
HGB UR QL STRIP: ABNORMAL
IMM GRANULOCYTES # BLD: 0 10E9/L (ref 0–0.4)
IMM GRANULOCYTES NFR BLD: 0.2 %
INR PPP: 0.93 (ref 0.86–1.14)
INTERPRETATION ECG - MUSE: NORMAL
KETONES UR STRIP-MCNC: NEGATIVE MG/DL
LEUKOCYTE ESTERASE UR QL STRIP: ABNORMAL
LYMPHOCYTES # BLD AUTO: 1.7 10E9/L (ref 0.8–5.3)
LYMPHOCYTES NFR BLD AUTO: 18.5 %
MCH RBC QN AUTO: 28 PG (ref 26.5–33)
MCHC RBC AUTO-ENTMCNC: 30.8 G/DL (ref 31.5–36.5)
MCV RBC AUTO: 91 FL (ref 78–100)
MONOCYTES # BLD AUTO: 0.8 10E9/L (ref 0–1.3)
MONOCYTES NFR BLD AUTO: 8.7 %
MUCOUS THREADS #/AREA URNS LPF: PRESENT /LPF
NEUTROPHILS # BLD AUTO: 5.8 10E9/L (ref 1.6–8.3)
NEUTROPHILS NFR BLD AUTO: 64.4 %
NITRATE UR QL: NEGATIVE
NRBC # BLD AUTO: 0 10*3/UL
NRBC BLD AUTO-RTO: 0 /100
PH UR STRIP: 8 PH (ref 5–7)
PLATELET # BLD AUTO: 281 10E9/L (ref 150–450)
POTASSIUM SERPL-SCNC: 3.9 MMOL/L (ref 3.4–5.3)
RBC # BLD AUTO: 4.72 10E12/L (ref 3.8–5.2)
RBC #/AREA URNS AUTO: 6 /HPF (ref 0–2)
SODIUM SERPL-SCNC: 139 MMOL/L (ref 133–144)
SOURCE: ABNORMAL
SP GR UR STRIP: 1.01 (ref 1–1.03)
SQUAMOUS #/AREA URNS AUTO: 1 /HPF (ref 0–1)
TROPONIN I BLD-MCNC: 0.01 UG/L (ref 0–0.08)
TROPONIN I SERPL-MCNC: <0.015 UG/L (ref 0–0.04)
TROPONIN I SERPL-MCNC: <0.015 UG/L (ref 0–0.04)
UROBILINOGEN UR STRIP-MCNC: 0 MG/DL (ref 0–2)
WBC # BLD AUTO: 9 10E9/L (ref 4–11)
WBC #/AREA URNS AUTO: 3 /HPF (ref 0–5)

## 2019-01-09 PROCEDURE — 71046 X-RAY EXAM CHEST 2 VIEWS: CPT

## 2019-01-09 PROCEDURE — 93005 ELECTROCARDIOGRAM TRACING: CPT

## 2019-01-09 PROCEDURE — 85025 COMPLETE CBC W/AUTO DIFF WBC: CPT | Performed by: EMERGENCY MEDICINE

## 2019-01-09 PROCEDURE — 80048 BASIC METABOLIC PNL TOTAL CA: CPT | Performed by: EMERGENCY MEDICINE

## 2019-01-09 PROCEDURE — 36415 COLL VENOUS BLD VENIPUNCTURE: CPT | Performed by: EMERGENCY MEDICINE

## 2019-01-09 PROCEDURE — 81001 URINALYSIS AUTO W/SCOPE: CPT | Performed by: EMERGENCY MEDICINE

## 2019-01-09 PROCEDURE — 85610 PROTHROMBIN TIME: CPT | Performed by: EMERGENCY MEDICINE

## 2019-01-09 PROCEDURE — 84484 ASSAY OF TROPONIN QUANT: CPT

## 2019-01-09 PROCEDURE — 87086 URINE CULTURE/COLONY COUNT: CPT | Performed by: EMERGENCY MEDICINE

## 2019-01-09 PROCEDURE — 25000132 ZZH RX MED GY IP 250 OP 250 PS 637: Performed by: EMERGENCY MEDICINE

## 2019-01-09 PROCEDURE — 84484 ASSAY OF TROPONIN QUANT: CPT | Performed by: EMERGENCY MEDICINE

## 2019-01-09 PROCEDURE — 99285 EMERGENCY DEPT VISIT HI MDM: CPT | Mod: 25

## 2019-01-09 RX ORDER — LISINOPRIL 10 MG/1
20 TABLET ORAL ONCE
Status: DISCONTINUED | OUTPATIENT
Start: 2019-01-09 | End: 2019-01-09

## 2019-01-09 RX ORDER — LISINOPRIL 10 MG/1
10 TABLET ORAL ONCE
Status: COMPLETED | OUTPATIENT
Start: 2019-01-09 | End: 2019-01-09

## 2019-01-09 RX ADMIN — LISINOPRIL 10 MG: 10 TABLET ORAL at 04:44

## 2019-01-09 ASSESSMENT — ENCOUNTER SYMPTOMS
MYALGIAS: 1
NERVOUS/ANXIOUS: 1
ABDOMINAL PAIN: 0
NECK PAIN: 1
BLOOD IN STOOL: 0
HEMATURIA: 1
FEVER: 0
SHORTNESS OF BREATH: 1
COUGH: 0

## 2019-01-09 NOTE — TELEPHONE ENCOUNTER
Pt called in stating that she was in the ED over night for chest pain and SOB. Pt also felt like her heart was irregular. ED workup was negative. EKG showed SB, no significant changes from 12/3/18 EKG. Pt scheduled for Echo 1/14/19. Pt would like to follow up with Dr. Zuleta or Silvia PAYNE after the echo to discuss her symptoms. Pt scheduled to see Silvia PAYNE 1/15/19.      Pt did mention that the ED doctor did give her lisinopril 10 mg earlier than she typically takes it. Pt wondering if she should take it again at home.   /92, 135/83, 118/96   Informed patient to not take additional lisinopril and resume her normal regimen tomorrow. Pt will monitor her BP and bring in to review with Silvia at . Pt agreed to plan.

## 2019-01-09 NOTE — ED PROVIDER NOTES
History     Chief Complaint:    Chest Pain and Shortness of Breath      HPI   Desire Moise is an anticoagulated 58 year old female with a history of hypertension, coronary artery disease, NSTEMI, and coronary artery dissection who presents to the ED for evaluation of chest pain and shortness of breath. The patient states that she has been having a rapid heart rate throughout the day yesterday. Tonight around midnight, she states that she developed pain throughout her shoulder, neck and chest. She states that she felt her chest pain when it felt like her heart skipped a beat, however, her mid neck/back pain is more constant. Around 0100, she also developed shortness of breath. She states that the chest pressure she feels in her mid upper chest feels similar to her previous heart attack which prompted her visit to the ED. She otherwise denies any fever, cough, pain or swelling in her legs, abdominal pain, or black/bloody bowel movements. She does note a mild amount of hematuria tonight; she notes that she previously had an abdomen MRI regarding hematuria which was negative for any acute cause of her bleeding. The patient has no other complaints.     Allergies:  The patient has no known drug allergies.    Medications:    Albuterol  Aspirin  Plavix  Lisinopril  Metoprolol  Nitrostat     Past Medical History:    ACS  Anemia  CAD  Diverticulosis  GERD  HTN  NSTEMI  Spina bifida occulta  Spontaneous dissection of coronary artery    Past Surgical History:    Gyn surgery  Hysterectomy  Incision of hymen  ORIF wrist  Remove hardware upper extremity  [place] teeth surgery    Family History:    CAD  Lipids    Social History:  Negative for tobacco use.  Negative for alcohol use.  Presents with .   Marital Status:   [2]     Review of Systems   Constitutional: Negative for fever.   Respiratory: Positive for shortness of breath. Negative for cough.    Cardiovascular: Positive for chest pain. Negative for leg  swelling.   Gastrointestinal: Negative for abdominal pain and blood in stool.   Genitourinary: Positive for hematuria.   Musculoskeletal: Positive for myalgias and neck pain.   Psychiatric/Behavioral: The patient is nervous/anxious.    All other systems reviewed and are negative.      Physical Exam   First Vitals:  BP: (!) 187/102  Pulse: 64  Heart Rate: 63  Temp: 98.7  F (37.1  C)  Resp: 20  Weight: 68.5 kg (151 lb 0.2 oz)  SpO2: 100 %      Physical Exam    Nursing note and vitals reviewed.    Constitutional: Pleasant and well groomed.          HENT:    Mouth/Throat: Oropharynx is without swelling or erythema. Oral mucosa moist.    Eyes: Conjunctivae are normal. No scleral icterus.    Neck: Neck supple.   Cardiovascular: Normal rate, regular rhythm and intact distal pulses.    Pulmonary/Chest: Effort normal and breath sounds normal.   Abdominal: Soft.  No distension. There is no tenderness.   Musculoskeletal:  No edema, No calf tenderness. Bilateral rhomboid tenderness and pain reproduced with arm movements.   Neurological:Alert and oriented. Coordination normal. Cranial nerves II-XII intact. Upper and lower extremity strength intact throughout. Finger-Nose-Finger without dysmetria. Light touch sensation intact throughout.   Skin: Skin is warm and dry.   Psychiatric: Normal mood and anxious affect.   Emergency Department Course   ECG:  Indication: chest pain  Time: 0206  Vent. Rate 59 bpm. LA interval 118. QRS duration 80. QT/QTc 424/419. P-R-T axis 42 73 81.  Sinus bradycardia. Otherwise normal ECG. No significant change compared to EKG dated 12/3/18. Read time: 0223    Imaging:  Radiographic findings were communicated with the patient who voiced understanding of the findings.  XR Chest 2 views:   No evidence of active cardiopulmonary disease as per radiology.    Laboratory:  CBC: WBC: 9.0, HGB: 13.2, PLT: 281  BMP: Glucose 91, o/w WNL (Creatinine: 0.80)    0239 Troponin (POC): 0.01  0240 Troponin:  <0.015  0456 Troponin: <0.015    INR: 0.93  UA with Microscopic: Blood Moderate (A), pH 8.0 (H), Leukocyte esterase Large (A), RBC 6 (H), Bacteria Few (A), mucous present (A) o/w WNL    Interventions:  0444 Lisinopril 10 mg PO    Emergency Department Course:  Nursing notes and vitals reviewed. (0211) I performed an exam of the patient as documented above.     EKG obtained in the ED, see results above.     IV inserted. Medicine administered as documented above. Blood drawn. This was sent to the lab for further testing, results above.     The patient was sent for a chest XR while in the emergency department, findings above.     0302 I rechecked the patient and discussed the results of her workup thus far. Patient refused chest XR. She also refused anxiety medication as she has struggled with drug addiction in the past. We have been unable to place an IV as of yet.     0436 Patient again refused anxiety medications. Suspect her symptoms are palpitation related.     BP improved to 160s/90s.    Findings and plan explained to the Patient. Patient discharged home with instructions regarding supportive care, medications, and reasons to return. The importance of close follow-up was reviewed.    I personally reviewed the laboratory results with the Patient and answered all related questions prior to discharge.   Impression & Plan    Medical Decision Making:  Patient has a past medical history significant for hypertension, coronary artery dissection which resulted in NSTEMI who presents with the constellation of symptoms as noted above which were similar to the symptoms she had when she had the NSTEMI. Differential diagnosis included but was not limited to NSTEMI, ACS, hypertensive urgency, palpitations, arrhythmia, musculoskeletal pain, anxiety. ED evaluation is as noted above. Initial evaluation was unremarkable. Apparently had been unable to obtain a working IV. The patient initially no interested in obtaining a chest XR  although agreed after explanation for the indication. Delta trop was also negative. The patient is in agreement that there is likely a component of anxiety but was reluctant to take any medications for this as she had previous dependency on benzodiazepines. I did give her her lisinopril dose a bit early and her BP has been improving. I do not feel that he symptoms today are secondary to hypertensive urgency. I recommended contacting her cardiologist today to discuss her presentation and ongoing evaluation and arrange for follow up with her PCP and return for any new or worsening symptoms.     Diagnosis:    ICD-10-CM    1. Palpitations R00.2 Troponin I (now)   2. Bilateral thoracic back pain, unspecified chronicity M54.6    3. Essential hypertension, benign I10        Disposition:  discharged to home    Scribe Disclosure:  I,  Alexys Browne, am serving as a scribe on 1/9/2019 at 2:11 AM to personally document services performed by Jennifer Whaley* based on my observations and the provider's statements to me.          Alexys Browne  1/9/2019   [facility] EMERGENCY DEPARTMENT       Jennifer Whaley MD  01/11/19 0810

## 2019-01-09 NOTE — ED TRIAGE NOTES
Presents with complaints of chest pressure and heart racing.  Had an MI in October.  Alert and oriented x2, abcd intact

## 2019-01-09 NOTE — DISCHARGE INSTRUCTIONS
Discharge Instructions  Palpitations    Palpitations are an unusual awareness of your heartbeat. People often describe this as the heart skipping, fluttering, racing, irregular, or pounding. At this time, your provider has found no signs that your palpitations are due to a serious or life-threatening condition. However, sometimes there is a serious problem that does not show up right away.    Palpitations can be caused by caffeine, cigarettes, diet pills, energy drinks or supplements, other stimulants, and medications and street drugs. They can also be caused by anxiety, hormone conditions such as high thyroid, and other medical conditions. Sometimes they are a sign of abnormal rhythm in the heart. At this time, your provider did not find any dangerous cause of your symptoms.    Generally, every Emergency Department visit should have a follow-up clinic visit with either a primary or a specialty clinic/provider. Please follow-up as instructed by your emergency provider today.    Return to the Emergency Department if:  You get chest pain or tightness.  You are short of breath.  You get very weak or tired.  You pass out or faint.  Your heart rate is over 120 beats per minute for more than 10 minutes while you are resting.   You have anything else that worries you.    What can I do to help myself?  Fill any prescriptions the provider gave you and take them right away.   Follow your provider?s instructions about the prescription medicines you are on. Sometimes the provider may tell you to stop taking a medicine or change the dose.  If you smoke, this may be a good time to quit! The less you can smoke, the better.  Do not use energy drinks, diet pills, or stimulants. Limit your use of caffeine.  If you were given a prescription for medicine here today, be sure to read all of the information (including the package insert) that comes with your prescription.  This will include important information about the medicine, its  side effects, and any warnings that you need to know about.  The pharmacist who fills the prescription can provide more information and answer questions you may have about the medicine.  If you have questions or concerns that the pharmacist cannot address, please call or return to the Emergency Department.     Remember that you can always come back to the Emergency Department if you are not able to see your regular provider in the amount of time listed above, if you get any new symptoms, or if there is anything that worries you.  Discharge Instructions  Chest Pain    You have been seen today for chest pain or discomfort.  At this time, your provider has found no signs that your chest pain is due to a serious or life-threatening condition, (or you have declined more testing and/or admission to the hospital). However, sometimes there is a serious problem that does not show up right away. Your evaluation today may not be complete and you may need further testing and evaluation.     Generally, every Emergency Department visit should have a follow-up clinic visit with either a primary or a specialty clinic/provider. Please follow-up as instructed by your emergency provider today.  Return to the Emergency Department if:  Your chest pain changes, gets worse, starts to happen more often, or comes with less activity.  You are newly short of breath.  You get very weak or tired.  You pass out or faint.  You have any new symptoms, like fever, cough, numb legs, or you cough up blood.  You have anything else that worries you.    Until you follow-up with your regular provider, please do the following:  Take one aspirin daily unless you have an allergy or are told not to by your provider.  If a stress test appointment has been made, go to the appointment.  If you have questions, contact your regular provider.  Follow-up with your regular provider/clinic as directed; this is very important.    If you were given a prescription for  medicine here today, be sure to read all of the information (including the package insert) that comes with your prescription.  This will include important information about the medicine, its side effects, and any warnings that you need to know about.  The pharmacist who fills the prescription can provide more information and answer questions you may have about the medicine.  If you have questions or concerns that the pharmacist cannot address, please call or return to the Emergency Department.       Remember that you can always come back to the Emergency Department if you are not able to see your regular provider in the amount of time listed above, if you get any new symptoms, or if there is anything that worries you.

## 2019-01-09 NOTE — ED AVS SNAPSHOT
[facility] Emergency Department  [address]  [facility] [address]  Phone:  [phone]  Fax:  [phone]                                    Desire Moise   MRN: [id number]    Department:  [facility] Emergency Department   Date of Visit:  1/9/2019           After Visit Summary Signature Page    I have received my discharge instructions, and my questions have been answered. I have discussed any challenges I see with this plan with the nurse or doctor.    ..........................................................................................................................................  Patient/Patient Representative Signature      ..........................................................................................................................................  Patient Representative Print Name and Relationship to Patient    ..................................................               ................................................  Date                                   Time    ..........................................................................................................................................  Reviewed by Signature/Title    ...................................................              ..............................................  Date                                               Time          22EPIC Rev 08/18

## 2019-01-10 LAB
BACTERIA SPEC CULT: NO GROWTH
Lab: NORMAL
SPECIMEN SOURCE: NORMAL

## 2019-01-10 NOTE — RESULT ENCOUNTER NOTE
Final urine culture report is NEGATIVE per [facility] ED Lab Result protocol.    If NEGATIVE result, no change in treatment, per [facility] ED Lab Result protocol.

## 2019-01-14 ENCOUNTER — HOSPITAL ENCOUNTER (OUTPATIENT)
Dept: CARDIOLOGY | Facility: CLINIC | Age: 59
Discharge: HOME | End: 2019-01-14
Attending: INTERNAL MEDICINE | Admitting: INTERNAL MEDICINE
Payer: COMMERCIAL

## 2019-01-14 DIAGNOSIS — I25.42 SPONTANEOUS DISSECTION OF CORONARY ARTERY: ICD-10-CM

## 2019-01-14 DIAGNOSIS — I25.5 ISCHEMIC CARDIOMYOPATHY: ICD-10-CM

## 2019-01-14 PROCEDURE — 93308 TTE F-UP OR LMTD: CPT

## 2019-01-14 PROCEDURE — 93308 TTE F-UP OR LMTD: CPT | Mod: 26 | Performed by: INTERNAL MEDICINE

## 2019-01-14 PROCEDURE — 93321 DOPPLER ECHO F-UP/LMTD STD: CPT | Mod: 26 | Performed by: INTERNAL MEDICINE

## 2019-01-14 PROCEDURE — 93325 DOPPLER ECHO COLOR FLOW MAPG: CPT | Mod: 26 | Performed by: INTERNAL MEDICINE

## 2019-01-15 ENCOUNTER — OFFICE VISIT (OUTPATIENT)
Dept: CARDIOLOGY | Facility: CLINIC | Age: 59
End: 2019-01-15
Payer: COMMERCIAL

## 2019-01-15 VITALS
DIASTOLIC BLOOD PRESSURE: 82 MMHG | BODY MASS INDEX: 23.72 KG/M2 | HEART RATE: 68 BPM | SYSTOLIC BLOOD PRESSURE: 118 MMHG | HEIGHT: 66 IN | WEIGHT: 147.6 LBS

## 2019-01-15 DIAGNOSIS — I25.42 CORONARY ARTERY DISSECTION: Primary | ICD-10-CM

## 2019-01-15 PROCEDURE — 99214 OFFICE O/P EST MOD 30 MIN: CPT | Performed by: PHYSICIAN ASSISTANT

## 2019-01-15 RX ORDER — METOPROLOL SUCCINATE 25 MG/1
25 TABLET, EXTENDED RELEASE ORAL AT BEDTIME
Qty: 30 TABLET | Refills: 3 | Status: SHIPPED | OUTPATIENT
Start: 2019-01-15 | End: 2019-01-21 | Stop reason: ALTCHOICE

## 2019-01-15 ASSESSMENT — MIFFLIN-ST. JEOR: SCORE: 1271.26

## 2019-01-15 NOTE — LETTER
1/15/2019      Matilde Christopher  [facility] [address] [place] [address]      RE: Desire Moise       Dear Colleague,    I had the pleasure of seeing Desire Moise in the [facility].    Service Date: 01/15/2019      HISTORY OF PRESENT ILLNESS:  Desire is a pleasant 58-year-old female who presents to the office today to follow up after recent ER visit due to palpitations as well as to review the results of her recent echocardiogram.      Again, her cardiovascular history includes a myocardial infarction secondary to a spontaneous coronary artery dissection back in 10/2018.  Her angiogram at that time did not show any atherosclerotic disease, but she did have what appeared to be a dissection in the first OM.  Her ventriculogram showed mild hypokinesis of the basal anterolateral wall with mildly reduced LV systolic function.  She was started on appropriate medical therapy.  She did have an echocardiogram which showed low-normal LV systolic function with an EF of 50%-55%.  There was severe mid and basal inferolateral wall and lateral wall hypokinesis.  She does not have a history of hypertension, hyperlipidemia, diabetes or history of tobacco use.      She was last in to see Dr. Zuleta in late November.  At that time she overall was doing well.  He did recommend that she have a repeat echocardiogram in January.  Last week she began experiencing intermittent palpitations.  These were ongoing on and off for about a day.  She does state she remembers she was having some palpitations around the time of her myocardial infarction.  Subsequently, she developed a bit of discomfort in her back and a slight shortness of breath.  She decided to seek evaluation in the Emergency Department.  She ruled out for an acute coronary syndrome.  She was already set up to have an outpatient echocardiogram.  It was recommended that she follow through with this and follow up in the  Cardiology office.      The patient states that after that ER visit, she started paying more attention to when she was noticing palpitations.  She felt that it was starting about an hour after taking her metoprolol.  On her own, she stopped taking her morning dose of metoprolol, and her daytime palpitations have significantly improved.  She still is noticing palpitations about an hour after taking her evening dose of metoprolol.  Otherwise, she is overall feeling well.  She occasionally gets a twinge in her chest but no sustained chest discomfort.      Her echocardiogram shows her EF continues to remain mildly reduced at 50%-55%.  There was hypokinesis of the mid to distal segments of the lateral wall.  No significant valvular heart disease.  The reader felt that overall her echo findings were unchanged from 10/2018.      CURRENT CARDIAC MEDICATIONS:   1.  Aspirin 81 mg daily.   2.  Clopidogrel 75 mg daily.   3.  Lisinopril 10 mg daily.   4.  Metoprolol tartrate 25 mg b.i.d.   5.  Sublingual nitro p.r.n.      The remainder of her medications, allergies and review of systems were reviewed and as are documented separately.      PHYSICAL EXAMINATION:   GENERAL:  The patient is a pleasant 58-year-old female who appears her stated age.  She is in no apparent distress.   VITAL SIGNS:  Her blood pressure is 118/82, pulse is 68, weight is 147 pounds.   RESPIRATORY:  Breathing is nonlabored.  Lungs are clear to auscultation.   CARDIAC:  Examination reveals a regular rate and rhythm.  I do not appreciate any murmur.   EXTREMITIES:  Lower extremities show no evidence of edema.   NEUROLOGIC:  Alert and oriented.      ASSESSMENT AND PLAN:  Desire is a pleasant 58-year-old female who suffered an acute myocardial infarction in 10/2018 secondary to a coronary artery dissection in the first obtuse marginal branch.  Her followup echocardiogram again is similar to what was noted back in October.  Recently she has been having some  palpitations which she relates to her metoprolol.  For the time being, I have asked her to discontinue the metoprolol tartrate.  We will start her on metoprolol succinate 25 mg just before bed.  I have asked her to contact us if she continues to have palpitations with this.  We could always try changing her to carvedilol.  We may also want to consider doing some type of rhythm monitoring.  Otherwise, she will continue the remainder of her medication regimen unchanged.  She did ask about lifting restrictions.  I am comfortable if she increases the amount she is lifting slightly to about 20-30 pounds.      She will plan to follow up with Dr. Zuleta in May as previously directed.  Again, I have encouraged her to contact me sooner with questions, concerns or continued palpitations.         DANIEL LEE PA-C             D: 01/15/2019   T: 2019   MT: DREW      Name:     MIGUEL LACKEY   MRN:      [id number]        Account:      [id number]   :      1960           Service Date: 01/15/2019      Document: [id number]           Outpatient Encounter Medications as of 1/15/2019   Medication Sig Dispense Refill     acetaminophen (TYLENOL) 325 MG tablet Take 2 tablets (650 mg) by mouth every 4 hours as needed for other (mild pain) 30 tablet 0     albuterol (PROAIR HFA/PROVENTIL HFA/VENTOLIN HFA) 108 (90 Base) MCG/ACT inhaler Inhale 2 puffs into the lungs every 6 hours as needed for shortness of breath / dyspnea or wheezing 1 Inhaler 0     aspirin 81 MG EC tablet Take 1 tablet (81 mg) by mouth daily 30 tablet 1     clopidogrel (PLAVIX) 75 MG tablet Take 1 tablet (75 mg) by mouth daily 30 tablet 3     lisinopril (PRINIVIL/ZESTRIL) 10 MG tablet Take 1 tablet (10 mg) by mouth daily 30 tablet 3     metoprolol succinate ER (TOPROL XL) 25 MG 24 hr tablet Take 1 tablet (25 mg) by mouth At Bedtime 30 tablet 3     benzonatate (TESSALON) 100 MG capsule Take 1 capsule (100 mg) by mouth 3 times daily as needed  (Patient not taking: Reported on 1/15/2019) 42 capsule 0     nitroGLYcerin (NITROSTAT) 0.4 MG sublingual tablet For chest pain place 1 tablet under the tongue every 5 minutes for 3 doses. If symptoms persist 5 minutes after 1st dose call 911. (Patient not taking: Reported on 10/23/2018) 25 tablet 0     [DISCONTINUED] metoprolol tartrate (LOPRESSOR) 25 MG tablet Take 1 tablet (25 mg) by mouth 2 times daily 60 tablet 3     No facility-administered encounter medications on file as of 1/15/2019.      Again, thank you for allowing me to participate in the care of your patient.      Sincerely,    Silvia Grace PA-C     [facility]

## 2019-01-15 NOTE — PATIENT INSTRUCTIONS
Thank you for your [facility] visit today. Your provider has recommended the following:  Medication Changes:  Stop the metoprolol tartrate  START the metoprolol succinate (XL) 25mg at night  Call if you continue to have palpitations with the metoprolol  Recommendations:  Ok to increase your lifting restrictions to 20-30lbs  Follow-up:  See Dr Zuleta for cardiology follow up in May or June 2019.    Reminder:  Please bring in your current medication list or your medication, over the counter supplements and vitamin bottles as we will review these at each office visit.

## 2019-01-15 NOTE — PROGRESS NOTES
Please see separate dictation for HPI, PHYSICAL EXAM AND IMPRESSION/PLAN.    CURRENT MEDICATIONS:  Current Outpatient Medications   Medication Sig Dispense Refill     acetaminophen (TYLENOL) 325 MG tablet Take 2 tablets (650 mg) by mouth every 4 hours as needed for other (mild pain) 30 tablet 0     albuterol (PROAIR HFA/PROVENTIL HFA/VENTOLIN HFA) 108 (90 Base) MCG/ACT inhaler Inhale 2 puffs into the lungs every 6 hours as needed for shortness of breath / dyspnea or wheezing 1 Inhaler 0     aspirin 81 MG EC tablet Take 1 tablet (81 mg) by mouth daily 30 tablet 1     clopidogrel (PLAVIX) 75 MG tablet Take 1 tablet (75 mg) by mouth daily 30 tablet 3     lisinopril (PRINIVIL/ZESTRIL) 10 MG tablet Take 1 tablet (10 mg) by mouth daily 30 tablet 3     metoprolol tartrate (LOPRESSOR) 25 MG tablet Take 1 tablet (25 mg) by mouth 2 times daily 60 tablet 3     benzonatate (TESSALON) 100 MG capsule Take 1 capsule (100 mg) by mouth 3 times daily as needed (Patient not taking: Reported on 1/15/2019) 42 capsule 0     nitroGLYcerin (NITROSTAT) 0.4 MG sublingual tablet For chest pain place 1 tablet under the tongue every 5 minutes for 3 doses. If symptoms persist 5 minutes after 1st dose call 911. (Patient not taking: Reported on 10/23/2018) 25 tablet 0       ALLERGIES:     Allergies   Allergen Reactions     Atorvastatin      Other reaction(s): Myalgias       PAST MEDICAL HISTORY:  Past Medical History:   Diagnosis Date     ACS (acute coronary syndrome) (H) 10/13/2018     Anemia 4/16/2007     Problem list name updated by automated process. Provider to review     CAD (coronary artery disease)     nonobstructive     Diverticulosis      Family history of other cardiovascular diseases 9/23/2002     Problem list name updated by automated process. Provider to review and confirm Problem list name updated by automated process. Provider to review     GERD (gastroesophageal reflux disease)      HTN (hypertension)      NSTEMI (non-ST elevated  myocardial infarction) (H) 10/13/2018     Palpitations      Spina bifida occulta 10/23/2002     Spontaneous dissection of coronary artery        PAST SURGICAL HISTORY:  Past Surgical History:   Procedure Laterality Date     GYN SURGERY       HYSTERECTOMY       INCISION OF HYMEN  1980     OPEN REDUCTION INTERNAL FIXATION WRIST Right 9/11/2017    Procedure: OPEN REDUCTION INTERNAL FIXATION WRIST;  Open reduction and internal fixation right intra-articular displaced distal radius fracture;  Surgeon: Santi Villarreal MD;  Location: [facility] OR     REMOVE HARDWARE UPPER EXTREMITY Right 2/2/2018    Procedure: REMOVE HARDWARE UPPER EXTREMITY;  Removal of deep implant, right distal radius;  Surgeon: Santi Villarreal MD;  Location: [facility] OR     Dawn teeth surgery  1980       SOCIAL HISTORY:  Social History     Socioeconomic History     Marital status:      Spouse name: John     Number of children: 3     Years of education: 15     Highest education level: None   Social Needs     Financial resource strain: None     Food insecurity - worry: None     Food insecurity - inability: None     Transportation needs - medical: None     Transportation needs - non-medical: None   Occupational History     Occupation: Teacher     Employer:    Tobacco Use     Smoking status: Never Smoker     Smokeless tobacco: Never Used   Substance and Sexual Activity     Alcohol use: No     Drug use: No     Sexual activity: Yes     Partners: Male     Birth control/protection: Condom   Other Topics Concern      Service Not Asked     Blood Transfusions Not Asked     Caffeine Concern Not Asked     Occupational Exposure Not Asked     Hobby Hazards Not Asked     Sleep Concern Not Asked     Stress Concern Not Asked     Weight Concern Not Asked     Special Diet Not Asked     Back Care Not Asked     Exercise Yes     Bike Helmet Not Asked     Seat Belt Yes     Self-Exams Yes     Parent/sibling w/ CABG, MI or angioplasty before 65F 55M? Not Asked    Social History Narrative     None       FAMILY HISTORY:  Family History   Problem Relation Age of Onset     Heart Disease Father         cab     Lipids Father      Cancer Maternal Grandmother         cancer  Breast       Review of Systems:  Skin:          Eyes:  Positive for glasses    ENT:  Negative      Respiratory:  Negative       Cardiovascular:    palpitations;Positive for;dizziness dizzy when getting up in the morning  Gastroenterology: Negative      Genitourinary:         Musculoskeletal:  Negative      Neurologic:  Negative      Psychiatric:  Negative      Heme/Lymph/Imm:  Negative      Endocrine:  Negative         Reviewed. Remainder of the note dictated.    Silvia Grace PA-C

## 2019-01-15 NOTE — LETTER
1/15/2019    [address] Christopher  [facility] [address]   [place] [address]    RE: Desire Moise       Dear Colleague,    I had the pleasure of seeing Desire Moise in the [facility].    Please see separate dictation for HPI, PHYSICAL EXAM AND IMPRESSION/PLAN.    CURRENT MEDICATIONS:  Current Outpatient Medications   Medication Sig Dispense Refill     acetaminophen (TYLENOL) 325 MG tablet Take 2 tablets (650 mg) by mouth every 4 hours as needed for other (mild pain) 30 tablet 0     albuterol (PROAIR HFA/PROVENTIL HFA/VENTOLIN HFA) 108 (90 Base) MCG/ACT inhaler Inhale 2 puffs into the lungs every 6 hours as needed for shortness of breath / dyspnea or wheezing 1 Inhaler 0     aspirin 81 MG EC tablet Take 1 tablet (81 mg) by mouth daily 30 tablet 1     clopidogrel (PLAVIX) 75 MG tablet Take 1 tablet (75 mg) by mouth daily 30 tablet 3     lisinopril (PRINIVIL/ZESTRIL) 10 MG tablet Take 1 tablet (10 mg) by mouth daily 30 tablet 3     metoprolol tartrate (LOPRESSOR) 25 MG tablet Take 1 tablet (25 mg) by mouth 2 times daily 60 tablet 3     benzonatate (TESSALON) 100 MG capsule Take 1 capsule (100 mg) by mouth 3 times daily as needed (Patient not taking: Reported on 1/15/2019) 42 capsule 0     nitroGLYcerin (NITROSTAT) 0.4 MG sublingual tablet For chest pain place 1 tablet under the tongue every 5 minutes for 3 doses. If symptoms persist 5 minutes after 1st dose call 911. (Patient not taking: Reported on 10/23/2018) 25 tablet 0       ALLERGIES:     Allergies   Allergen Reactions     Atorvastatin      Other reaction(s): Myalgias       PAST MEDICAL HISTORY:  Past Medical History:   Diagnosis Date     ACS (acute coronary syndrome) (H) 10/13/2018     Anemia 4/16/2007     Problem list name updated by automated process. Provider to review     CAD (coronary artery disease)     nonobstructive     Diverticulosis      Family history of other cardiovascular diseases 9/23/2002      Problem list name updated by automated process. Provider to review and confirm Problem list name updated by automated process. Provider to review     GERD (gastroesophageal reflux disease)      HTN (hypertension)      NSTEMI (non-ST elevated myocardial infarction) (H) 10/13/2018     Palpitations      Spina bifida occulta 10/23/2002     Spontaneous dissection of coronary artery        PAST SURGICAL HISTORY:  Past Surgical History:   Procedure Laterality Date     GYN SURGERY       HYSTERECTOMY       INCISION OF HYMEN  1980     OPEN REDUCTION INTERNAL FIXATION WRIST Right 9/11/2017    Procedure: OPEN REDUCTION INTERNAL FIXATION WRIST;  Open reduction and internal fixation right intra-articular displaced distal radius fracture;  Surgeon: Santi Villarreal MD;  Location: [facility] OR     REMOVE HARDWARE UPPER EXTREMITY Right 2/2/2018    Procedure: REMOVE HARDWARE UPPER EXTREMITY;  Removal of deep implant, right distal radius;  Surgeon: Santi Villarreal MD;  Location: [facility] OR     [place] teeth surgery  1980       SOCIAL HISTORY:  Social History     Socioeconomic History     Marital status:      Spouse name: John     Number of children: 3     Years of education: 15     Highest education level: None   Social Needs     Financial resource strain: None     Food insecurity - worry: None     Food insecurity - inability: None     Transportation needs - medical: None     Transportation needs - non-medical: None   Occupational History     Occupation: Teacher     Employer:    Tobacco Use     Smoking status: Never Smoker     Smokeless tobacco: Never Used   Substance and Sexual Activity     Alcohol use: No     Drug use: No     Sexual activity: Yes     Partners: Male     Birth control/protection: Condom   Other Topics Concern      Service Not Asked     Blood Transfusions Not Asked     Caffeine Concern Not Asked     Occupational Exposure Not Asked     Hobby Hazards Not Asked     Sleep Concern Not Asked     Stress Concern  Not Asked     Weight Concern Not Asked     Special Diet Not Asked     Back Care Not Asked     Exercise Yes     Bike Helmet Not Asked     Seat Belt Yes     Self-Exams Yes     Parent/sibling w/ CABG, MI or angioplasty before 65F 55M? Not Asked   Social History Narrative     None       FAMILY HISTORY:  Family History   Problem Relation Age of Onset     Heart Disease Father         cab     Lipids Father      Cancer Maternal Grandmother         cancer  Breast       Review of Systems:  Skin:          Eyes:  Positive for glasses    ENT:  Negative      Respiratory:  Negative       Cardiovascular:    palpitations;Positive for;dizziness dizzy when getting up in the morning  Gastroenterology: Negative      Genitourinary:         Musculoskeletal:  Negative      Neurologic:  Negative      Psychiatric:  Negative      Heme/Lymph/Imm:  Negative      Endocrine:  Negative         Reviewed. Remainder of the note dictated.    Silvia Garce PA-C        Service Date: 01/15/2019      HISTORY OF PRESENT ILLNESS:  Desire is a pleasant 58-year-old female who presents to the office today to follow up after recent ER visit due to palpitations as well as to review the results of her recent echocardiogram.      Again, her cardiovascular history includes a myocardial infarction secondary to a spontaneous coronary artery dissection back in 10/2018.  Her angiogram at that time did not show any atherosclerotic disease, but she did have what appeared to be a dissection in the first OM.  Her ventriculogram showed mild hypokinesis of the basal anterolateral wall with mildly reduced LV systolic function.  She was started on appropriate medical therapy.  She did have an echocardiogram which showed low-normal LV systolic function with an EF of 50%-55%.  There was severe mid and basal inferolateral wall and lateral wall hypokinesis.  She does not have a history of hypertension, hyperlipidemia, diabetes or history of tobacco use.      She was last  in to see Dr. Zuleta in late November.  At that time she overall was doing well.  He did recommend that she have a repeat echocardiogram in January.  Last week she began experiencing intermittent palpitations.  These were ongoing on and off for about a day.  She does state she remembers she was having some palpitations around the time of her myocardial infarction.  Subsequently, she developed a bit of discomfort in her back and a slight shortness of breath.  She decided to seek evaluation in the Emergency Department.  She ruled out for an acute coronary syndrome.  She was already set up to have an outpatient echocardiogram.  It was recommended that she follow through with this and follow up in the Cardiology office.      The patient states that after that ER visit, she started paying more attention to when she was noticing palpitations.  She felt that it was starting about an hour after taking her metoprolol.  On her own, she stopped taking her morning dose of metoprolol, and her daytime palpitations have significantly improved.  She still is noticing palpitations about an hour after taking her evening dose of metoprolol.  Otherwise, she is overall feeling well.  She occasionally gets a twinge in her chest but no sustained chest discomfort.      Her echocardiogram shows her EF continues to remain mildly reduced at 50%-55%.  There was hypokinesis of the mid to distal segments of the lateral wall.  No significant valvular heart disease.  The reader felt that overall her echo findings were unchanged from 10/2018.      CURRENT CARDIAC MEDICATIONS:   1.  Aspirin 81 mg daily.   2.  Clopidogrel 75 mg daily.   3.  Lisinopril 10 mg daily.   4.  Metoprolol tartrate 25 mg b.i.d.   5.  Sublingual nitro p.r.n.      The remainder of her medications, allergies and review of systems were reviewed and as are documented separately.      PHYSICAL EXAMINATION:   GENERAL:  The patient is a pleasant 58-year-old female who appears her  stated age.  She is in no apparent distress.   VITAL SIGNS:  Her blood pressure is 118/82, pulse is 68, weight is 147 pounds.   RESPIRATORY:  Breathing is nonlabored.  Lungs are clear to auscultation.   CARDIAC:  Examination reveals a regular rate and rhythm.  I do not appreciate any murmur.   EXTREMITIES:  Lower extremities show no evidence of edema.   NEUROLOGIC:  Alert and oriented.      ASSESSMENT AND PLAN:  Miguel is a pleasant 58-year-old female who suffered an acute myocardial infarction in 10/2018 secondary to a coronary artery dissection in the first obtuse marginal branch.  Her followup echocardiogram again is similar to what was noted back in October.  Recently she has been having some palpitations which she relates to her metoprolol.  For the time being, I have asked her to discontinue the metoprolol tartrate.  We will start her on metoprolol succinate 25 mg just before bed.  I have asked her to contact us if she continues to have palpitations with this.  We could always try changing her to carvedilol.  We may also want to consider doing some type of rhythm monitoring.  Otherwise, she will continue the remainder of her medication regimen unchanged.  She did ask about lifting restrictions.  I am comfortable if she increases the amount she is lifting slightly to about 20-30 pounds.      She will plan to follow up with Dr. Zuleta in May as previously directed.  Again, I have encouraged her to contact me sooner with questions, concerns or continued palpitations.         SILVIA LEE PA-C             D: 01/15/2019   T: 2019   MT: DREW      Name:     MIGUEL LACKEY   MRN:      [id number]        Account:      [id number]   :      1960           Service Date: 01/15/2019      Document: [id number]        Thank you for allowing me to participate in the care of your patient.      Sincerely,     Silvia Lee PA-C     [facility]    cc:   No referring  provider defined for this encounter.

## 2019-01-16 NOTE — PROGRESS NOTES
Service Date: 01/15/2019      HISTORY OF PRESENT ILLNESS:  Desire is a pleasant 58-year-old female who presents to the office today to follow up after recent ER visit due to palpitations as well as to review the results of her recent echocardiogram.      Again, her cardiovascular history includes a myocardial infarction secondary to a spontaneous coronary artery dissection back in 10/2018.  Her angiogram at that time did not show any atherosclerotic disease, but she did have what appeared to be a dissection in the first OM.  Her ventriculogram showed mild hypokinesis of the basal anterolateral wall with mildly reduced LV systolic function.  She was started on appropriate medical therapy.  She did have an echocardiogram which showed low-normal LV systolic function with an EF of 50%-55%.  There was severe mid and basal inferolateral wall and lateral wall hypokinesis.  She does not have a history of hypertension, hyperlipidemia, diabetes or history of tobacco use.      She was last in to see Dr. Zuleta in late November.  At that time she overall was doing well.  He did recommend that she have a repeat echocardiogram in January.  Last week she began experiencing intermittent palpitations.  These were ongoing on and off for about a day.  She does state she remembers she was having some palpitations around the time of her myocardial infarction.  Subsequently, she developed a bit of discomfort in her back and a slight shortness of breath.  She decided to seek evaluation in the Emergency Department.  She ruled out for an acute coronary syndrome.  She was already set up to have an outpatient echocardiogram.  It was recommended that she follow through with this and follow up in the Cardiology office.      The patient states that after that ER visit, she started paying more attention to when she was noticing palpitations.  She felt that it was starting about an hour after taking her metoprolol.  On her own, she stopped taking  her morning dose of metoprolol, and her daytime palpitations have significantly improved.  She still is noticing palpitations about an hour after taking her evening dose of metoprolol.  Otherwise, she is overall feeling well.  She occasionally gets a twinge in her chest but no sustained chest discomfort.      Her echocardiogram shows her EF continues to remain mildly reduced at 50%-55%.  There was hypokinesis of the mid to distal segments of the lateral wall.  No significant valvular heart disease.  The reader felt that overall her echo findings were unchanged from 10/2018.      CURRENT CARDIAC MEDICATIONS:   1.  Aspirin 81 mg daily.   2.  Clopidogrel 75 mg daily.   3.  Lisinopril 10 mg daily.   4.  Metoprolol tartrate 25 mg b.i.d.   5.  Sublingual nitro p.r.n.      The remainder of her medications, allergies and review of systems were reviewed and as are documented separately.      PHYSICAL EXAMINATION:   GENERAL:  The patient is a pleasant 58-year-old female who appears her stated age.  She is in no apparent distress.   VITAL SIGNS:  Her blood pressure is 118/82, pulse is 68, weight is 147 pounds.   RESPIRATORY:  Breathing is nonlabored.  Lungs are clear to auscultation.   CARDIAC:  Examination reveals a regular rate and rhythm.  I do not appreciate any murmur.   EXTREMITIES:  Lower extremities show no evidence of edema.   NEUROLOGIC:  Alert and oriented.      ASSESSMENT AND PLAN:  Desire is a pleasant 58-year-old female who suffered an acute myocardial infarction in 10/2018 secondary to a coronary artery dissection in the first obtuse marginal branch.  Her followup echocardiogram again is similar to what was noted back in October.  Recently she has been having some palpitations which she relates to her metoprolol.  For the time being, I have asked her to discontinue the metoprolol tartrate.  We will start her on metoprolol succinate 25 mg just before bed.  I have asked her to contact us if she continues to have  palpitations with this.  We could always try changing her to carvedilol.  We may also want to consider doing some type of rhythm monitoring.  Otherwise, she will continue the remainder of her medication regimen unchanged.  She did ask about lifting restrictions.  I am comfortable if she increases the amount she is lifting slightly to about 20-30 pounds.      She will plan to follow up with Dr. Zuleta in May as previously directed.  Again, I have encouraged her to contact me sooner with questions, concerns or continued palpitations.         DANIEL LEE PA-C             D: 01/15/2019   T: 2019   MT: DREW      Name:     MIGUEL LACKEY   MRN:      [id number]        Account:      [id number]   :      1960           Service Date: 01/15/2019      Document: [id number]

## 2019-01-17 ENCOUNTER — TELEPHONE (OUTPATIENT)
Dept: CARDIOLOGY | Facility: CLINIC | Age: 59
End: 2019-01-17

## 2019-01-17 DIAGNOSIS — R00.2 PALPITATIONS: Primary | ICD-10-CM

## 2019-01-17 NOTE — TELEPHONE ENCOUNTER
"Received message from patient regarding HRs.   Last seen 1/15/19, pt c/o of palpitations w/ her Metoprolol tartrate. Metoprolol switched to long-acting 25 mg at bedtime.     Pt took her last Metoprolol tartrate 25 mg tablet on 1/15 PM. She skipped her BB yesterday 1/16 because she stated, \"my body holds medication for longer than usual and I didn't want to overdose\".     This AM pt woke up feeling \"rapid HRs\". Feels her heart is racing. Denies CP and SOB. Explained to pt this might by because she stopped her BB. Pt nervous because she believes the BB is causing the palpitations.     Of note, she has been on BB for 3 months and palpitations started last week. Consider heart monitor?  Will route to Silvia HERNANDEZ for review. CLAUDE [place]    ADDENDUM 1/17/2019: I still want her to try the Toprol XL as directed. Yes, we can set her up for a 7 day Zio Patch and she can come back and see me to discuss it. Silvia Grace PA-C      "

## 2019-01-18 ENCOUNTER — HOSPITAL ENCOUNTER (OUTPATIENT)
Dept: CARDIOLOGY | Facility: CLINIC | Age: 59
Discharge: HOME | End: 2019-01-18
Attending: PHYSICIAN ASSISTANT | Admitting: PHYSICIAN ASSISTANT
Payer: COMMERCIAL

## 2019-01-18 DIAGNOSIS — R00.2 PALPITATIONS: ICD-10-CM

## 2019-01-18 PROCEDURE — 0296T ZIO PATCH HOLTER ADULT PEDIATRIC GREATER THAN 48 HRS: CPT

## 2019-01-18 PROCEDURE — 0298T ZIO PATCH HOLTER ADULT PEDIATRIC GREATER THAN 48 HRS: CPT | Performed by: INTERNAL MEDICINE

## 2019-01-18 NOTE — TELEPHONE ENCOUNTER
Spoke with patient regarding sxs and recommendations per Silvia HERNANDEZ. Pt to get Zio patch for 7 days. Will set up placement for today as patient is very anxious. CLAUDE [place]

## 2019-01-21 ENCOUNTER — TELEPHONE (OUTPATIENT)
Dept: CARDIOLOGY | Facility: CLINIC | Age: 59
End: 2019-01-21

## 2019-01-21 RX ORDER — METOPROLOL TARTRATE 25 MG/1
12.5 TABLET, FILM COATED ORAL 2 TIMES DAILY
Qty: 90 TABLET | Refills: 3 | COMMUNITY
Start: 2019-01-21 | End: 2019-02-04

## 2019-01-21 NOTE — TELEPHONE ENCOUNTER
RN called patient and advised her that KATHY Silvia is ok with her taking metop tartrate 12.5mg BID and to follow up with her after completion of zio patch. Patient verbalized understanding and is in agreement with plan. Patient will call prior to f/u apt with any further questions or concerns. RN transferred patient to scheduling to arrange f/u apt. Med list updated to reflect change.

## 2019-01-21 NOTE — TELEPHONE ENCOUNTER
"Pt/RN phone discussion noted. It is ok for her to stop the metoprolol succinate. I am not exactly sure what she means by taking metoprolol tartrate but \"splitting the dose between AM and PM\". She had been on metoprolol tartrate 25mg BID, but stopped the AM dose before I last saw her. If she means taking 12.5mg (1/2 25mg tab) TWICE a day that is ok with me. Follow up with me after her Zio Patch monitor. Silvia Grace PA-C    "

## 2019-01-21 NOTE — TELEPHONE ENCOUNTER
"ADDENDUM: patient returned call and advised that she had zio patch monitor applied on 1/18/19. Patient reports still having palpitations, headache, GI (diarrhea) and dizziness on metoprolol succinate. Patient also reports having new onset of dull left sided \"chest ache/pinching\" she notices after starting metoprolol succinate. Patient wondering if KATHY Vega ok with her taking metoprolol tartrate again, but splitting dose between am and pm to help minimize symptoms. RN advised patient that RN would send to KATHY Vega for review and recommendation and call her with a response. Patient verbalized understanding and agreed with plan.     RN received VM from patient advising us to call her to discuss her recent medication change. RN returned patient's call and left VM advising patient to call clinic to discuss further.     1/15/19 OV note KATHY Vega  ASSESSMENT AND PLAN:  Desire is a pleasant 58-year-old female who suffered an acute myocardial infarction in 10/2018 secondary to a coronary artery dissection in the first obtuse marginal branch.  Her followup echocardiogram again is similar to what was noted back in October.  Recently she has been having some palpitations which she relates to her metoprolol.  For the time being, I have asked her to discontinue the metoprolol tartrate.  We will start her on metoprolol succinate 25 mg just before bed.  I have asked her to contact us if she continues to have palpitations with this.  We could always try changing her to carvedilol.  We may also want to consider doing some type of rhythm monitoring.  Otherwise, she will continue the remainder of her medication regimen unchanged.  She did ask about lifting restrictions.  I am comfortable if she increases the amount she is lifting slightly to about 20-30 pounds.      She will plan to follow up with Dr. Zuleta in May as previously directed.  Again, I have encouraged her to contact me sooner with questions, concerns or " continued palpitations.         DANIEL LEE PA-C

## 2019-01-22 NOTE — TELEPHONE ENCOUNTER
ADDENDUM: Patient call today advising that after she took 12.5mg of metoprolol tartrate last night she experienced a racing HR (80BPM) with symptoms of palpitations (BP was 175/116) approx 45 min after taking medication. patient also reports she had 9 episodes of diarrhea throughout the night. Patient reports she did not take her morning dose of metoprolol tartrate d/t symptoms she experienced throughout the night. Patient reports symptoms have improved, but she still has had 3 episodes of diarrhea already this morning (current HR 60 with /99). Patient inquiring if it is safe to hold off from taking any new medication until OV with KATHY Silvia on 2/4/19 as she highly suspects her symptoms are related to the metoprolol. Patient expressed concern over multiple sensitives with medications. RN advised patient that RN would review with KATHY Vega her symptoms and request to hold medication until upcoming OV and would call her back with recommendation. Patient verbalized understanding and agreed with plan.        RN received VM from patient advising that she was up all night after taking metoprolol tartrate (reduced dose). RN returned patient's call and left Vm advising patient to call clinic to discuss symptoms further.

## 2019-01-22 NOTE — TELEPHONE ENCOUNTER
Documentation from RN phone conversation with the patient from today reviewed. Ok to stay off a BB for now until we can talk about things further at her follow up visit. Please let her know that if her GI symptoms/diarrhea is not better by early next week then the symptoms are not from the metoprolol and she should see her PCP to assess them further. I believe she still has the Zio so we are continuing to assess her palpitation symptoms. Silvia Grace PA-C

## 2019-01-22 NOTE — TELEPHONE ENCOUNTER
RN called patient and reviewed with her KATHY Silvia's recommendations below to hold BB until OV on 2/4/19. RN also advised patient to call PMD if diarrhea persists until early next week. RN also instructed patient to call clinic prior to OV with any worsening symptoms. Patient acknowledged understanding and agreed with plan.

## 2019-02-04 ENCOUNTER — OFFICE VISIT (OUTPATIENT)
Dept: CARDIOLOGY | Facility: CLINIC | Age: 59
End: 2019-02-04
Payer: COMMERCIAL

## 2019-02-04 VITALS
BODY MASS INDEX: 23.7 KG/M2 | HEIGHT: 66 IN | WEIGHT: 147.5 LBS | DIASTOLIC BLOOD PRESSURE: 72 MMHG | SYSTOLIC BLOOD PRESSURE: 114 MMHG | HEART RATE: 64 BPM

## 2019-02-04 DIAGNOSIS — R00.2 PALPITATIONS: Primary | ICD-10-CM

## 2019-02-04 DIAGNOSIS — I25.42 SPONTANEOUS DISSECTION OF CORONARY ARTERY: ICD-10-CM

## 2019-02-04 PROCEDURE — 99213 OFFICE O/P EST LOW 20 MIN: CPT | Performed by: PHYSICIAN ASSISTANT

## 2019-02-04 ASSESSMENT — MIFFLIN-ST. JEOR: SCORE: 1270.81

## 2019-02-04 NOTE — LETTER
2/4/2019      Matildenixon Christopher  [facility] [address] [place] [address]      RE: Desire Moise       Dear Colleague,    I had the pleasure of seeing Desire Moise in the [facility].    Service Date: 02/04/2019      HISTORY OF PRESENT ILLNESS:  Desire is a pleasant 58-year-old female who presents to the office today to follow up on recently noted palpitations as well as to review the results of a recent ZIO Patch monitor.      Her cardiovascular history includes a myocardial infarction secondary to a spontaneous coronary artery dissection in 10/2018.  Her angiogram at that time did not show any atherosclerotic disease, but she did have what appeared to be a dissection in the first OM.  Her LV gram at that time showed mild hypokinesis of the basal anterolateral wall with mildly reduced LV systolic function.  She was started on appropriate medical therapy with aspirin, clopidogrel and metoprolol.  She did have a followup echocardiogram which showed low normal LV systolic function with an EF of 50%-55% with an area of severe mid and basal inferolateral wall and lateral wall hypokinesis.  She does not have a history of hypertension, hyperlipidemia, diabetes or tobacco use.      I last saw the patient in mid January after she had been experiencing some palpitations.  She actually had been in the ER just prior to my office visit.  She ruled out for acute coronary syndrome at that time.  Her blood work was unrevealing.  It was recommended that she follow up in the Cardiology office.        When I met with the patient, she felt that the palpitations were related to taking her metoprolol.  On her own, she had stopped taking the morning dose of her metoprolol and her daytime palpitations had significantly improved; however, she still was noting palpitations about an hour after taking her evening dose of metoprolol.  I asked her to change from metoprolol tartrate to  metoprolol succinate.  She eventually did make this change but continued to have symptoms.  She was in contact with our office by telephone several times.  Initially I asked her to have a 7-day ZIO Patch monitor.  She called again stating that her symptoms were intolerable and I asked her to discontinue the metoprolol altogether.      At this point, Desire tells me since stopping metoprolol, her symptoms have completely resolved.  She states she feels great.  She follows her blood pressure closely at home and typically gets systolic readings between 100 and 120.  Her diastolic readings are in the 60s and 70s.  Her heart rate is typically between 60 and 70.  She denies any cardiovascular symptoms such as chest discomfort, dyspnea on exertion, orthopnea or PND.  Again, her prior palpitations and lightheadedness have completely resolved.      We reviewed the results of her recent ZIO Patch monitor.  She had the monitor on for about 6 days.  The predominant rhythm was sinus.  She did have 2 short runs of SVT, 1 lasting 6 beats and 1 lasting 8 beats.  She had noted 5 diary entries with this sensation of a skipped beat, shortness of breath, fluttering, dizziness, chest discomfort and anxiousness lasting between 10 minutes and 1 hour.  During the corresponding time, 4 of these showed absolutely no significant findings and she was in sinus rhythm.  One of the entries showed 1 PAC; 33 patient events were also noted and 16 of these showed no associated arrhythmia whatsoever.  A few of them had an associated PAC or PVC.  She also did appear to be aware of this short runs of SVT.      CURRENT CARDIAC MEDICATIONS:   1.  Aspirin 81 mg daily.   2.  Clopidogrel 75 mg daily.   3.  Lisinopril 10 mg daily.      The remainder of her medications, allergies and review of systems were reviewed and as are documented separately.      PHYSICAL EXAMINATION:   GENERAL:  The patient is a pleasant 58-year-old female who appears her stated age.   She is in no apparent distress.   VITAL SIGNS:  Her blood pressure is 114/72, pulse 64, weight is 147 pounds, which is stable.  Breathing is nonlabored.   LUNGS:  Clear to auscultation.   CARDIAC:  Reveals a regular rate and rhythm.  I do not appreciate any murmur.   NEUROLOGIC:  Alert and oriented.      ASSESSMENT AND PLAN:  Desire is a pleasant 58-year-old female who suffered an acute myocardial infarction in 10/2018 secondary to coronary artery dissection in the first obtuse marginal branch.  She has had several followup echocardiograms and her EF is in the 50%-55% range.  She does have an area of hypokinesis which corresponds with the coronary artery dissection.        Recently, the patient had been having increased palpitations.  She related this to her metoprolol.  I did have her wear a 7-day ZIO Patch monitor.  Overall, her symptoms which lasted 10 minutes to an hour did not correlate with any arrhythmia.  She was noted to have rare PACs and PVCs.  She had 2 very short runs of supraventricular tachycardia which again would not explain her ongoing symptoms.  She discontinued the metoprolol and all of her prior symptoms have resolved.  I did discuss with her today that some data suggests that there is a lower risk of recurrent SCAD with beta blocker use.  We discussed a trial of carvedilol, but at this time she declines.  She will continue the remainder of her current cardiac medication regimen unchanged.  Again, she will continue with her weight restriction, limiting lifting to less than 25-30 pounds.      She already set up a followup with Dr. Zuleta in March.  I have let her know that if she is feeling well and does not have any questions or concerns and if she is not interested in trialing carvedilol, she can certainly push out this office visit to 05/2019 or 06/2019 as previously directed.  Again, I encouraged her to contact us sooner with any questions or concerns.         DANIEL LEE PA-C              D: 2019   T: 2019   MT: JH      Name:     MIGUEL LACKEY   MRN:      [id number]        Account:      [id number]   :      1960           Service Date: 2019      Document: [id number]         Outpatient Encounter Medications as of 2019   Medication Sig Dispense Refill     aspirin 81 MG EC tablet Take 1 tablet (81 mg) by mouth daily 30 tablet 1     clopidogrel (PLAVIX) 75 MG tablet Take 1 tablet (75 mg) by mouth daily 30 tablet 3     lisinopril (PRINIVIL/ZESTRIL) 10 MG tablet Take 1 tablet (10 mg) by mouth daily 30 tablet 3     acetaminophen (TYLENOL) 325 MG tablet Take 2 tablets (650 mg) by mouth every 4 hours as needed for other (mild pain) (Patient not taking: Reported on 2019) 30 tablet 0     albuterol (PROAIR HFA/PROVENTIL HFA/VENTOLIN HFA) 108 (90 Base) MCG/ACT inhaler Inhale 2 puffs into the lungs every 6 hours as needed for shortness of breath / dyspnea or wheezing (Patient not taking: Reported on 2019) 1 Inhaler 0     benzonatate (TESSALON) 100 MG capsule Take 1 capsule (100 mg) by mouth 3 times daily as needed (Patient not taking: Reported on 1/15/2019) 42 capsule 0     [DISCONTINUED] metoprolol tartrate (LOPRESSOR) 25 MG tablet Take 0.5 tablets (12.5 mg) by mouth 2 times daily 90 tablet 3     [DISCONTINUED] nitroGLYcerin (NITROSTAT) 0.4 MG sublingual tablet For chest pain place 1 tablet under the tongue every 5 minutes for 3 doses. If symptoms persist 5 minutes after 1st dose call 911. (Patient not taking: Reported on 10/23/2018) 25 tablet 0     No facility-administered encounter medications on file as of 2019.        Again, thank you for allowing me to participate in the care of your patient.      Sincerely,    Silvia Grace PA-C     [facility]

## 2019-02-04 NOTE — PROGRESS NOTES
Please see separate dictation for HPI, PHYSICAL EXAM AND IMPRESSION/PLAN.    CURRENT MEDICATIONS:  Current Outpatient Medications   Medication Sig Dispense Refill     aspirin 81 MG EC tablet Take 1 tablet (81 mg) by mouth daily 30 tablet 1     clopidogrel (PLAVIX) 75 MG tablet Take 1 tablet (75 mg) by mouth daily 30 tablet 3     lisinopril (PRINIVIL/ZESTRIL) 10 MG tablet Take 1 tablet (10 mg) by mouth daily 30 tablet 3     acetaminophen (TYLENOL) 325 MG tablet Take 2 tablets (650 mg) by mouth every 4 hours as needed for other (mild pain) (Patient not taking: Reported on 2/4/2019) 30 tablet 0     albuterol (PROAIR HFA/PROVENTIL HFA/VENTOLIN HFA) 108 (90 Base) MCG/ACT inhaler Inhale 2 puffs into the lungs every 6 hours as needed for shortness of breath / dyspnea or wheezing (Patient not taking: Reported on 2/4/2019) 1 Inhaler 0     benzonatate (TESSALON) 100 MG capsule Take 1 capsule (100 mg) by mouth 3 times daily as needed (Patient not taking: Reported on 1/15/2019) 42 capsule 0       ALLERGIES:     Allergies   Allergen Reactions     Atorvastatin      Other reaction(s): Myalgias       PAST MEDICAL HISTORY:  Past Medical History:   Diagnosis Date     ACS (acute coronary syndrome) (H) 10/13/2018     Anemia 4/16/2007     Problem list name updated by automated process. Provider to review     CAD (coronary artery disease)     nonobstructive     Diverticulosis      Family history of other cardiovascular diseases 9/23/2002     Problem list name updated by automated process. Provider to review and confirm Problem list name updated by automated process. Provider to review     GERD (gastroesophageal reflux disease)      HTN (hypertension)      NSTEMI (non-ST elevated myocardial infarction) (H) 10/13/2018     Palpitations      Spina bifida occulta 10/23/2002     Spontaneous dissection of coronary artery        PAST SURGICAL HISTORY:  Past Surgical History:   Procedure Laterality Date     GYN SURGERY       HYSTERECTOMY        INCISION OF CHASEEN  1980     OPEN REDUCTION INTERNAL FIXATION WRIST Right 9/11/2017    Procedure: OPEN REDUCTION INTERNAL FIXATION WRIST;  Open reduction and internal fixation right intra-articular displaced distal radius fracture;  Surgeon: Santi Villarreal MD;  Location: [facility] OR     REMOVE HARDWARE UPPER EXTREMITY Right 2/2/2018    Procedure: REMOVE HARDWARE UPPER EXTREMITY;  Removal of deep implant, right distal radius;  Surgeon: Santi Villarreal MD;  Location: [facility] OR     Pimento teeth surgery  1980       SOCIAL HISTORY:  Social History     Socioeconomic History     Marital status:      Spouse name: John     Number of children: 3     Years of education: 15     Highest education level: None   Social Needs     Financial resource strain: None     Food insecurity - worry: None     Food insecurity - inability: None     Transportation needs - medical: None     Transportation needs - non-medical: None   Occupational History     Occupation: Teacher     Employer: [id number]   Tobacco Use     Smoking status: Never Smoker     Smokeless tobacco: Never Used   Substance and Sexual Activity     Alcohol use: No     Drug use: No     Sexual activity: Yes     Partners: Male     Birth control/protection: Condom   Other Topics Concern      Service Not Asked     Blood Transfusions Not Asked     Caffeine Concern Not Asked     Occupational Exposure Not Asked     Hobby Hazards Not Asked     Sleep Concern Not Asked     Stress Concern Not Asked     Weight Concern Not Asked     Special Diet Not Asked     Back Care Not Asked     Exercise Yes     Bike Helmet Not Asked     Seat Belt Yes     Self-Exams Yes     Parent/sibling w/ CABG, MI or angioplasty before 65F 55M? Not Asked   Social History Narrative     None       FAMILY HISTORY:  Family History   Problem Relation Age of Onset     Heart Disease Father         cab     Lipids Father      Cancer Maternal Grandmother         cancer  Breast       Review of Systems:  Skin:  Negative        Eyes:  Positive for glasses    ENT:  Negative      Respiratory:  Negative       Cardiovascular:  Negative      Gastroenterology: Negative      Genitourinary:  Negative      Musculoskeletal:  Negative      Neurologic:  Negative      Psychiatric:  Negative      Heme/Lymph/Imm:  Negative      Endocrine:  Negative         Reviewed. Remainder of the note dictated.    Silvia Grace PA-C

## 2019-02-04 NOTE — PATIENT INSTRUCTIONS
Thank you for your [facility] visit today. Your provider has recommended the following:  Medication Changes:  No changes today  Consider trying a different beta blocker due to the history of the dissection  Recommendations:  Nothing new at this time  Follow-up:  Follow up with Dr Zuleta either in March or you can wait until May or June if you are feeling well.     Reminder:  Please bring in your current medication list or your medication, over the counter supplements and vitamin bottles as we will review these at each office visit.

## 2019-02-04 NOTE — PROGRESS NOTES
Service Date: 02/04/2019      HISTORY OF PRESENT ILLNESS:  Desire is a pleasant 58-year-old female who presents to the office today to follow up on recently noted palpitations as well as to review the results of a recent ZIO Patch monitor.      Her cardiovascular history includes a myocardial infarction secondary to a spontaneous coronary artery dissection in 10/2018.  Her angiogram at that time did not show any atherosclerotic disease, but she did have what appeared to be a dissection in the first OM.  Her LV gram at that time showed mild hypokinesis of the basal anterolateral wall with mildly reduced LV systolic function.  She was started on appropriate medical therapy with aspirin, clopidogrel and metoprolol.  She did have a followup echocardiogram which showed low normal LV systolic function with an EF of 50%-55% with an area of severe mid and basal inferolateral wall and lateral wall hypokinesis.  She does not have a history of hypertension, hyperlipidemia, diabetes or tobacco use.      I last saw the patient in mid January after she had been experiencing some palpitations.  She actually had been in the ER just prior to my office visit.  She ruled out for acute coronary syndrome at that time.  Her blood work was unrevealing.  It was recommended that she follow up in the Cardiology office.        When I met with the patient, she felt that the palpitations were related to taking her metoprolol.  On her own, she had stopped taking the morning dose of her metoprolol and her daytime palpitations had significantly improved; however, she still was noting palpitations about an hour after taking her evening dose of metoprolol.  I asked her to change from metoprolol tartrate to metoprolol succinate.  She eventually did make this change but continued to have symptoms.  She was in contact with our office by telephone several times.  Initially I asked her to have a 7-day ZIO Patch monitor.  She called again stating that her  symptoms were intolerable and I asked her to discontinue the metoprolol altogether.      At this point, Desire tells me since stopping metoprolol, her symptoms have completely resolved.  She states she feels great.  She follows her blood pressure closely at home and typically gets systolic readings between 100 and 120.  Her diastolic readings are in the 60s and 70s.  Her heart rate is typically between 60 and 70.  She denies any cardiovascular symptoms such as chest discomfort, dyspnea on exertion, orthopnea or PND.  Again, her prior palpitations and lightheadedness have completely resolved.      We reviewed the results of her recent ZIO Patch monitor.  She had the monitor on for about 6 days.  The predominant rhythm was sinus.  She did have 2 short runs of SVT, 1 lasting 6 beats and 1 lasting 8 beats.  She had noted 5 diary entries with this sensation of a skipped beat, shortness of breath, fluttering, dizziness, chest discomfort and anxiousness lasting between 10 minutes and 1 hour.  During the corresponding time, 4 of these showed absolutely no significant findings and she was in sinus rhythm.  One of the entries showed 1 PAC; 33 patient events were also noted and 16 of these showed no associated arrhythmia whatsoever.  A few of them had an associated PAC or PVC.  She also did appear to be aware of this short runs of SVT.      CURRENT CARDIAC MEDICATIONS:   1.  Aspirin 81 mg daily.   2.  Clopidogrel 75 mg daily.   3.  Lisinopril 10 mg daily.      The remainder of her medications, allergies and review of systems were reviewed and as are documented separately.      PHYSICAL EXAMINATION:   GENERAL:  The patient is a pleasant 58-year-old female who appears her stated age.  She is in no apparent distress.   VITAL SIGNS:  Her blood pressure is 114/72, pulse 64, weight is 147 pounds, which is stable.  Breathing is nonlabored.   LUNGS:  Clear to auscultation.   CARDIAC:  Reveals a regular rate and rhythm.  I do not  appreciate any murmur.   NEUROLOGIC:  Alert and oriented.      ASSESSMENT AND PLAN:  Miguel is a pleasant 58-year-old female who suffered an acute myocardial infarction in 10/2018 secondary to coronary artery dissection in the first obtuse marginal branch.  She has had several followup echocardiograms and her EF is in the 50%-55% range.  She does have an area of hypokinesis which corresponds with the coronary artery dissection.        Recently, the patient had been having increased palpitations.  She related this to her metoprolol.  I did have her wear a 7-day ZIO Patch monitor.  Overall, her symptoms which lasted 10 minutes to an hour did not correlate with any arrhythmia.  She was noted to have rare PACs and PVCs.  She had 2 very short runs of supraventricular tachycardia which again would not explain her ongoing symptoms.  She discontinued the metoprolol and all of her prior symptoms have resolved.  I did discuss with her today that some data suggests that there is a lower risk of recurrent SCAD with beta blocker use.  We discussed a trial of carvedilol, but at this time she declines.  She will continue the remainder of her current cardiac medication regimen unchanged.  Again, she will continue with her weight restriction, limiting lifting to less than 25-30 pounds.      She already set up a followup with Dr. Zuleta in March.  I have let her know that if she is feeling well and does not have any questions or concerns and if she is not interested in trialing carvedilol, she can certainly push out this office visit to 2019 or 2019 as previously directed.  Again, I encouraged her to contact us sooner with any questions or concerns.         DANIEL LEE PA-C             D: 2019   T: 2019   MT: DORIS      Name:     MIGUEL LACKEY   MRN:      [id number]        Account:      [id number]   :      1960           Service Date: 2019      Document: [id number]

## 2019-02-04 NOTE — LETTER
2/4/2019    Matildenixon Christopher  [facility] [address] [place] [address]    RE: Desire Moise       Dear Colleague,    I had the pleasure of seeing Desire Moise in the [facility].    Please see separate dictation for HPI, PHYSICAL EXAM AND IMPRESSION/PLAN.    CURRENT MEDICATIONS:  Current Outpatient Medications   Medication Sig Dispense Refill     aspirin 81 MG EC tablet Take 1 tablet (81 mg) by mouth daily 30 tablet 1     clopidogrel (PLAVIX) 75 MG tablet Take 1 tablet (75 mg) by mouth daily 30 tablet 3     lisinopril (PRINIVIL/ZESTRIL) 10 MG tablet Take 1 tablet (10 mg) by mouth daily 30 tablet 3     acetaminophen (TYLENOL) 325 MG tablet Take 2 tablets (650 mg) by mouth every 4 hours as needed for other (mild pain) (Patient not taking: Reported on 2/4/2019) 30 tablet 0     albuterol (PROAIR HFA/PROVENTIL HFA/VENTOLIN HFA) 108 (90 Base) MCG/ACT inhaler Inhale 2 puffs into the lungs every 6 hours as needed for shortness of breath / dyspnea or wheezing (Patient not taking: Reported on 2/4/2019) 1 Inhaler 0     benzonatate (TESSALON) 100 MG capsule Take 1 capsule (100 mg) by mouth 3 times daily as needed (Patient not taking: Reported on 1/15/2019) 42 capsule 0       ALLERGIES:     Allergies   Allergen Reactions     Atorvastatin      Other reaction(s): Myalgias       PAST MEDICAL HISTORY:  Past Medical History:   Diagnosis Date     ACS (acute coronary syndrome) (H) 10/13/2018     Anemia 4/16/2007     Problem list name updated by automated process. Provider to review     CAD (coronary artery disease)     nonobstructive     Diverticulosis      Family history of other cardiovascular diseases 9/23/2002     Problem list name updated by automated process. Provider to review and confirm Problem list name updated by automated process. Provider to review     GERD (gastroesophageal reflux disease)      HTN (hypertension)      NSTEMI (non-ST elevated myocardial infarction)  (H) 10/13/2018     Palpitations      Spina bifida occulta 10/23/2002     Spontaneous dissection of coronary artery        PAST SURGICAL HISTORY:  Past Surgical History:   Procedure Laterality Date     GYN SURGERY       HYSTERECTOMY       INCISION OF HYMEN  1980     OPEN REDUCTION INTERNAL FIXATION WRIST Right 9/11/2017    Procedure: OPEN REDUCTION INTERNAL FIXATION WRIST;  Open reduction and internal fixation right intra-articular displaced distal radius fracture;  Surgeon: Santi Villarreal MD;  Location: [facility] OR     REMOVE HARDWARE UPPER EXTREMITY Right 2/2/2018    Procedure: REMOVE HARDWARE UPPER EXTREMITY;  Removal of deep implant, right distal radius;  Surgeon: Santi Villarreal MD;  Location: [facility] OR     Mabel teeth surgery  1980       SOCIAL HISTORY:  Social History     Socioeconomic History     Marital status:      Spouse name: John     Number of children: 3     Years of education: 15     Highest education level: None   Social Needs     Financial resource strain: None     Food insecurity - worry: None     Food insecurity - inability: None     Transportation needs - medical: None     Transportation needs - non-medical: None   Occupational History     Occupation: Teacher     Employer:    Tobacco Use     Smoking status: Never Smoker     Smokeless tobacco: Never Used   Substance and Sexual Activity     Alcohol use: No     Drug use: No     Sexual activity: Yes     Partners: Male     Birth control/protection: Condom   Other Topics Concern      Service Not Asked     Blood Transfusions Not Asked     Caffeine Concern Not Asked     Occupational Exposure Not Asked     Hobby Hazards Not Asked     Sleep Concern Not Asked     Stress Concern Not Asked     Weight Concern Not Asked     Special Diet Not Asked     Back Care Not Asked     Exercise Yes     Bike Helmet Not Asked     Seat Belt Yes     Self-Exams Yes     Parent/sibling w/ CABG, MI or angioplasty before 65F 55M? Not Asked   Social History  Narrative     None       FAMILY HISTORY:  Family History   Problem Relation Age of Onset     Heart Disease Father         cab     Lipids Father      Cancer Maternal Grandmother         cancer  Breast       Review of Systems:  Skin:  Negative       Eyes:  Positive for glasses    ENT:  Negative      Respiratory:  Negative       Cardiovascular:  Negative      Gastroenterology: Negative      Genitourinary:  Negative      Musculoskeletal:  Negative      Neurologic:  Negative      Psychiatric:  Negative      Heme/Lymph/Imm:  Negative      Endocrine:  Negative         Reviewed. Remainder of the note dictated.    Silvia Grace PA-C      Service Date: 02/04/2019      HISTORY OF PRESENT ILLNESS:  Desire is a pleasant 58-year-old female who presents to the office today to follow up on recently noted palpitations as well as to review the results of a recent ZIO Patch monitor.      Her cardiovascular history includes a myocardial infarction secondary to a spontaneous coronary artery dissection in 10/2018.  Her angiogram at that time did not show any atherosclerotic disease, but she did have what appeared to be a dissection in the first OM.  Her LV gram at that time showed mild hypokinesis of the basal anterolateral wall with mildly reduced LV systolic function.  She was started on appropriate medical therapy with aspirin, clopidogrel and metoprolol.  She did have a followup echocardiogram which showed low normal LV systolic function with an EF of 50%-55% with an area of severe mid and basal inferolateral wall and lateral wall hypokinesis.  She does not have a history of hypertension, hyperlipidemia, diabetes or tobacco use.      I last saw the patient in mid January after she had been experiencing some palpitations.  She actually had been in the ER just prior to my office visit.  She ruled out for acute coronary syndrome at that time.  Her blood work was unrevealing.  It was recommended that she follow up in the  Cardiology office.        When I met with the patient, she felt that the palpitations were related to taking her metoprolol.  On her own, she had stopped taking the morning dose of her metoprolol and her daytime palpitations had significantly improved; however, she still was noting palpitations about an hour after taking her evening dose of metoprolol.  I asked her to change from metoprolol tartrate to metoprolol succinate.  She eventually did make this change but continued to have symptoms.  She was in contact with our office by telephone several times.  Initially I asked her to have a 7-day ZIO Patch monitor.  She called again stating that her symptoms were intolerable and I asked her to discontinue the metoprolol altogether.      At this point, Desire tells me since stopping metoprolol, her symptoms have completely resolved.  She states she feels great.  She follows her blood pressure closely at home and typically gets systolic readings between 100 and 120.  Her diastolic readings are in the 60s and 70s.  Her heart rate is typically between 60 and 70.  She denies any cardiovascular symptoms such as chest discomfort, dyspnea on exertion, orthopnea or PND.  Again, her prior palpitations and lightheadedness have completely resolved.      We reviewed the results of her recent ZIO Patch monitor.  She had the monitor on for about 6 days.  The predominant rhythm was sinus.  She did have 2 short runs of SVT, 1 lasting 6 beats and 1 lasting 8 beats.  She had noted 5 diary entries with this sensation of a skipped beat, shortness of breath, fluttering, dizziness, chest discomfort and anxiousness lasting between 10 minutes and 1 hour.  During the corresponding time, 4 of these showed absolutely no significant findings and she was in sinus rhythm.  One of the entries showed 1 PAC; 33 patient events were also noted and 16 of these showed no associated arrhythmia whatsoever.  A few of them had an associated PAC or PVC.  She also  did appear to be aware of this short runs of SVT.      CURRENT CARDIAC MEDICATIONS:   1.  Aspirin 81 mg daily.   2.  Clopidogrel 75 mg daily.   3.  Lisinopril 10 mg daily.      The remainder of her medications, allergies and review of systems were reviewed and as are documented separately.      PHYSICAL EXAMINATION:   GENERAL:  The patient is a pleasant 58-year-old female who appears her stated age.  She is in no apparent distress.   VITAL SIGNS:  Her blood pressure is 114/72, pulse 64, weight is 147 pounds, which is stable.  Breathing is nonlabored.   LUNGS:  Clear to auscultation.   CARDIAC:  Reveals a regular rate and rhythm.  I do not appreciate any murmur.   NEUROLOGIC:  Alert and oriented.      ASSESSMENT AND PLAN:  Desire is a pleasant 58-year-old female who suffered an acute myocardial infarction in 10/2018 secondary to coronary artery dissection in the first obtuse marginal branch.  She has had several followup echocardiograms and her EF is in the 50%-55% range.  She does have an area of hypokinesis which corresponds with the coronary artery dissection.        Recently, the patient had been having increased palpitations.  She related this to her metoprolol.  I did have her wear a 7-day ZIO Patch monitor.  Overall, her symptoms which lasted 10 minutes to an hour did not correlate with any arrhythmia.  She was noted to have rare PACs and PVCs.  She had 2 very short runs of supraventricular tachycardia which again would not explain her ongoing symptoms.  She discontinued the metoprolol and all of her prior symptoms have resolved.  I did discuss with her today that some data suggests that there is a lower risk of recurrent SCAD with beta blocker use.  We discussed a trial of carvedilol, but at this time she declines.  She will continue the remainder of her current cardiac medication regimen unchanged.  Again, she will continue with her weight restriction, limiting lifting to less than 25-30 pounds.      She  already set up a followup with Dr. Zuleta in March.  I have let her know that if she is feeling well and does not have any questions or concerns and if she is not interested in trialing carvedilol, she can certainly push out this office visit to 2019 or 2019 as previously directed.  Again, I encouraged her to contact us sooner with any questions or concerns.         SILVIA LEE PA-C             D: 2019   T: 2019   MT: DORIS      Name:     MIGUEL LACKEY   MRN:      [id number]        Account:      [id number]   :      1960           Service Date: 2019      Document: [id number]       Thank you for allowing me to participate in the care of your patient.      Sincerely,     Silvia Lee PA-C     [facility]    cc:   Matilde Christopher  [facility]  [address] [place] [address]

## 2019-02-08 DIAGNOSIS — I21.4 NSTEMI (NON-ST ELEVATED MYOCARDIAL INFARCTION) (H): ICD-10-CM

## 2019-02-08 RX ORDER — CLOPIDOGREL BISULFATE 75 MG/1
75 TABLET ORAL DAILY
Qty: 90 TABLET | Refills: 3 | Status: SHIPPED | OUTPATIENT
Start: 2019-02-08 | End: 2019-10-29

## 2019-02-08 RX ORDER — LISINOPRIL 10 MG/1
10 TABLET ORAL DAILY
Qty: 90 TABLET | Refills: 3 | Status: SHIPPED | OUTPATIENT
Start: 2019-02-08 | End: 2020-02-11

## 2019-03-07 ENCOUNTER — OFFICE VISIT (OUTPATIENT)
Dept: CARDIOLOGY | Facility: CLINIC | Age: 59
End: 2019-03-07
Payer: COMMERCIAL

## 2019-03-07 VITALS
DIASTOLIC BLOOD PRESSURE: 80 MMHG | WEIGHT: 143 LBS | SYSTOLIC BLOOD PRESSURE: 128 MMHG | HEIGHT: 66 IN | HEART RATE: 67 BPM | BODY MASS INDEX: 22.98 KG/M2 | OXYGEN SATURATION: 99 %

## 2019-03-07 DIAGNOSIS — R00.2 PALPITATIONS: ICD-10-CM

## 2019-03-07 DIAGNOSIS — I25.42 SPONTANEOUS DISSECTION OF CORONARY ARTERY: ICD-10-CM

## 2019-03-07 DIAGNOSIS — I21.4 NSTEMI (NON-ST ELEVATED MYOCARDIAL INFARCTION) (H): ICD-10-CM

## 2019-03-07 DIAGNOSIS — I24.9 ACS (ACUTE CORONARY SYNDROME) (H): Primary | ICD-10-CM

## 2019-03-07 PROCEDURE — 99214 OFFICE O/P EST MOD 30 MIN: CPT | Performed by: INTERNAL MEDICINE

## 2019-03-07 ASSESSMENT — MIFFLIN-ST. JEOR: SCORE: 1250.39

## 2019-03-07 NOTE — LETTER
3/7/2019      Matilde Christopher  [facility] [address]  [place] [address]      RE: Desire Moise       Dear Colleague,    I had the pleasure of seeing Desire Moise in the [facility].    Service Date: 2019      Matilde Christopher MD   [facility]    [address] [place] [address]      RE: Desire Moise    MRN: [id number]   : 1960      Dear Dr. Christopher:       I again had the pleasure of seeing your patient Desire Moise at [facility] for evaluation of a non-ST elevation myocardial infarction on 10/13/2018 and likely SCAD.  Desire is a delightful, 58-year-old female admitted 10/13/2018 for what sounded like unstable angina.  Troponin levels were elevated, and EKG showed sinus rhythm with nonspecific ST-T wave changes.  Coronary angiography demonstrated a narrowing in the first obtuse marginal branch artery with tapering in the mid vessel.  There was EMMIE grade 3 flow, however.  Echocardiogram showed severe mid and basal inferolateral and lateral wall hypokinesis with ejection fraction of 50%-55%.  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  The patient has not tolerated the beta blocker very well due to palpitations.  A Holter monitor did not demonstrate any significant arrhythmias.  The beta blocker was discontinued, and the patient has felt improved.  After completing cardiac rehab, she is now exercising on a treadmill at 3.8 miles per hour for 35 minutes every other day.  She denies chest pain or shortness of breath.  A journal of her home blood pressures and pulse rates has for the most part been normal.  She is on a low-salt diet.  She denies PND, orthopnea or peripheral edema.  Evaluation for fibromuscular dysplasia was negative.  She asks whether she can now begin lifting objects, including her grandchild, that weigh more  than 20-30 pounds.      PHYSICAL EXAMINATION:  As listed below.      ASSESSMENT:   1.  Miguel Lackey is a pleasant, 58-year-old female hospitalized 10/13/2018 for chest discomfort and a non-ST elevation myocardial infarction.  Coronary angiography was consistent with coronary artery dissection in the first obtuse marginal branch artery.  We reviewed her previous testing to rule out fibromuscular dysplasia.  An MRI of her head and neck in  was normal.  A previous CT scan of her chest and abdomen and pelvis did not show any vascular abnormalities, either.  She is currently stable.  She is free to do any activities she wishes.   2.  An echocardiogram was performed on  demonstrating the continued lateral wall motion abnormality and the ejection fraction remaining at 50%-55%.  I suspect this will be a chronic finding for her.  I would continue with her aspirin and clopidogrel for 1 year and then discontinue the clopidogrel and leave her on aspirin.      It is my pleasure to assist in the care of Miguel Lackey.  I will see her again in 6 months or earlier on a p.r.n. basis.  Her  was in attendance today.  All their questions were answered to their satisfaction.      Sincerely,      Mark Zuleta MD, FACC         MARK ZULETA MD, FACC             D: 2019   T: 2019   MT: charity      Name:     MIGUEL LACKEY   MRN:      [id number]        Account:      [id number]   :      1960           Service Date: 2019      Document: [id number]           Outpatient Encounter Medications as of 3/7/2019   Medication Sig Dispense Refill     aspirin 81 MG EC tablet Take 1 tablet (81 mg) by mouth daily 30 tablet 1     clopidogrel (PLAVIX) 75 MG tablet Take 1 tablet (75 mg) by mouth daily 90 tablet 3     lisinopril (PRINIVIL/ZESTRIL) 10 MG tablet Take 1 tablet (10 mg) by mouth daily 90 tablet 3     [DISCONTINUED] acetaminophen (TYLENOL) 325 MG tablet Take 2 tablets (650 mg) by mouth every 4  hours as needed for other (mild pain) (Patient not taking: Reported on 2/4/2019) 30 tablet 0     [DISCONTINUED] albuterol (PROAIR HFA/PROVENTIL HFA/VENTOLIN HFA) 108 (90 Base) MCG/ACT inhaler Inhale 2 puffs into the lungs every 6 hours as needed for shortness of breath / dyspnea or wheezing (Patient not taking: Reported on 2/4/2019) 1 Inhaler 0     [DISCONTINUED] benzonatate (TESSALON) 100 MG capsule Take 1 capsule (100 mg) by mouth 3 times daily as needed (Patient not taking: Reported on 1/15/2019) 42 capsule 0     No facility-administered encounter medications on file as of 3/7/2019.        Again, thank you for allowing me to participate in the care of your patient.      Sincerely,    Juan Carlos Zuleta MD     [facility]

## 2019-03-07 NOTE — PROGRESS NOTES
Service Date: 2019      Matilde Christopher MD   [facility]    [address] [id number]      RE: Desire Moise    MRN: [id number]   : 1960      Dear Dr. Christopher:       I again had the pleasure of seeing your patient Desire Moise at [facility] for evaluation of a non-ST elevation myocardial infarction on 10/13/2018 and likely SCAD.  Desire is a delightful, 58-year-old female admitted 10/13/2018 for what sounded like unstable angina.  Troponin levels were elevated, and EKG showed sinus rhythm with nonspecific ST-T wave changes.  Coronary angiography demonstrated a narrowing in the first obtuse marginal branch artery with tapering in the mid vessel.  There was EMMIE grade 3 flow, however.  Echocardiogram showed severe mid and basal inferolateral and lateral wall hypokinesis with ejection fraction of 50%-55%.  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  The patient has not tolerated the beta blocker very well due to palpitations.  A Holter monitor did not demonstrate any significant arrhythmias.  The beta blocker was discontinued, and the patient has felt improved.  After completing cardiac rehab, she is now exercising on a treadmill at 3.8 miles per hour for 35 minutes every other day.  She denies chest pain or shortness of breath.  A journal of her home blood pressures and pulse rates has for the most part been normal.  She is on a low-salt diet.  She denies PND, orthopnea or peripheral edema.  Evaluation for fibromuscular dysplasia was negative.  She asks whether she can now begin lifting objects, including her grandchild, that weigh more than 20-30 pounds.      PHYSICAL EXAMINATION:  As listed below.      ASSESSMENT:   1.  Desire Moise is a pleasant, 58-year-old female hospitalized 10/13/2018 for chest discomfort and a non-ST elevation myocardial infarction.  Coronary  angiography was consistent with coronary artery dissection in the first obtuse marginal branch artery.  We reviewed her previous testing to rule out fibromuscular dysplasia.  An MRI of her head and neck in 2015 was normal.  A previous CT scan of her chest and abdomen and pelvis did not show any vascular abnormalities, either.  She is currently stable.  She is free to do any activities she wishes.   2.  An echocardiogram was performed on  demonstrating the continued lateral wall motion abnormality and the ejection fraction remaining at 50%-55%.  I suspect this will be a chronic finding for her.  I would continue with her aspirin and clopidogrel for 1 year and then discontinue the clopidogrel and leave her on aspirin.      It is my pleasure to assist in the care of Miguel Lackey.  I will see her again in 6 months or earlier on a p.r.n. basis.  Her  was in attendance today.  All their questions were answered to their satisfaction.      Sincerely,      Mark Zuleta MD, FACC         MARK ZULETA MD, FACC             D: 2019   T: 2019   MT: charity      Name:     MIGUEL LACKEY   MRN:      [id number]        Account:      [id number]   :      1960           Service Date: 2019      Document: [id number]

## 2019-03-07 NOTE — LETTER
3/7/2019    Matildenixon Christopher  [facility] [address] [place] [address]    RE: Desire Moise       Dear Colleague,    I had the pleasure of seeing Desire Moise in the [facility].    HPI and Plan:   See dictation:[id number]    Orders Placed This Encounter   Procedures     Follow-Up with Cardiologist       No orders of the defined types were placed in this encounter.      Medications Discontinued During This Encounter   Medication Reason     benzonatate (TESSALON) 100 MG capsule Therapy completed     albuterol (PROAIR HFA/PROVENTIL HFA/VENTOLIN HFA) 108 (90 Base) MCG/ACT inhaler Therapy completed     acetaminophen (TYLENOL) 325 MG tablet Therapy completed         Encounter Diagnoses   Name Primary?     ACS (acute coronary syndrome) (H) Yes     NSTEMI (non-ST elevated myocardial infarction) (H)      Spontaneous dissection of coronary artery      Palpitations        CURRENT MEDICATIONS:  Current Outpatient Medications   Medication Sig Dispense Refill     aspirin 81 MG EC tablet Take 1 tablet (81 mg) by mouth daily 30 tablet 1     clopidogrel (PLAVIX) 75 MG tablet Take 1 tablet (75 mg) by mouth daily 90 tablet 3     lisinopril (PRINIVIL/ZESTRIL) 10 MG tablet Take 1 tablet (10 mg) by mouth daily 90 tablet 3       ALLERGIES     Allergies   Allergen Reactions     Atorvastatin      Other reaction(s): Myalgias     Metoprolol Palpitations       PAST MEDICAL HISTORY:  Past Medical History:   Diagnosis Date     ACS (acute coronary syndrome) (H) 10/13/2018     Anemia 4/16/2007     Problem list name updated by automated process. Provider to review     CAD (coronary artery disease)     nonobstructive     Diverticulosis      Family history of other cardiovascular diseases 9/23/2002     Problem list name updated by automated process. Provider to review and confirm Problem list name updated by automated process. Provider to review     GERD (gastroesophageal reflux disease)      HTN  (hypertension)      NSTEMI (non-ST elevated myocardial infarction) (H) 10/13/2018     Palpitations      Spina bifida occulta 10/23/2002     Spontaneous dissection of coronary artery        PAST SURGICAL HISTORY:  Past Surgical History:   Procedure Laterality Date     GYN SURGERY       HYSTERECTOMY       INCISION OF HYMEN  1980     OPEN REDUCTION INTERNAL FIXATION WRIST Right 9/11/2017    Procedure: OPEN REDUCTION INTERNAL FIXATION WRIST;  Open reduction and internal fixation right intra-articular displaced distal radius fracture;  Surgeon: Santi Villarreal MD;  Location: [facility] OR     REMOVE HARDWARE UPPER EXTREMITY Right 2/2/2018    Procedure: REMOVE HARDWARE UPPER EXTREMITY;  Removal of deep implant, right distal radius;  Surgeon: Santi Villarreal MD;  Location: [facility] OR     [place] teeth surgery  1980       FAMILY HISTORY:  Family History   Problem Relation Age of Onset     Heart Disease Father         cab     Lipids Father      Cancer Maternal Grandmother         cancer  Breast       SOCIAL HISTORY:  Social History     Socioeconomic History     Marital status:      Spouse name: John     Number of children: 3     Years of education: 15     Highest education level: None   Occupational History     Occupation: Teacher     Employer:    Social Needs     Financial resource strain: None     Food insecurity:     Worry: None     Inability: None     Transportation needs:     Medical: None     Non-medical: None   Tobacco Use     Smoking status: Never Smoker     Smokeless tobacco: Never Used   Substance and Sexual Activity     Alcohol use: No     Drug use: No     Sexual activity: Yes     Partners: Male     Birth control/protection: Condom   Lifestyle     Physical activity:     Days per week: None     Minutes per session: None     Stress: None   Relationships     Social connections:     Talks on phone: None     Gets together: None     Attends Caodaism service: None     Active member of club or organization: None     " Attends meetings of clubs or organizations: None     Relationship status: None     Intimate partner violence:     Fear of current or ex partner: None     Emotionally abused: None     Physically abused: None     Forced sexual activity: None   Other Topics Concern      Service Not Asked     Blood Transfusions Not Asked     Caffeine Concern Not Asked     Occupational Exposure Not Asked     Hobby Hazards Not Asked     Sleep Concern Not Asked     Stress Concern Not Asked     Weight Concern Not Asked     Special Diet Not Asked     Back Care Not Asked     Exercise Yes     Bike Helmet Not Asked     Seat Belt Yes     Self-Exams Yes     Parent/sibling w/ CABG, MI or angioplasty before 65F 55M? Not Asked   Social History Narrative     None       Review of Systems:  Skin:  Positive for bruising     Eyes:  Positive for glasses    ENT:  Positive for   lots of nose bleeds, nose bleeds all of thew time   Respiratory:  Negative       Cardiovascular:  Negative      Gastroenterology: Negative      Genitourinary:  Negative      Musculoskeletal:  Negative      Neurologic:  Negative      Psychiatric:  Negative      Heme/Lymph/Imm:  Positive for easy bruising    Endocrine:  Negative        Physical Exam:  Vitals: /80 (BP Location: Right arm, Patient Position: Sitting, Cuff Size: Adult Large)   Pulse 67   Ht 1.676 m (5' 6\")   Wt 64.9 kg (143 lb)   LMP 06/15/2003   SpO2 99%   BMI 23.08 kg/m       Constitutional:  cooperative, alert and oriented, well developed, well nourished, in no acute distress        Skin:  warm and dry to the touch, no apparent skin lesions or masses noted          Head:  normocephalic, no masses or lesions        Eyes:  pupils equal and round;conjunctivae and lids unremarkable;sclera white;no xanthalasma;EOMS intact        Lymph:      ENT:  no pallor or cyanosis, dentition good        Neck:  carotid pulses are full and equal bilaterally, JVP normal, no carotid bruit        Respiratory:  normal " breath sounds, clear to auscultation, normal A-P diameter, normal symmetry, normal respiratory excursion, no use of accessory muscles         Cardiac: regular rhythm, normal S1/S2, no S3 or S4, apical impulse not displaced, no murmurs, gallops or rubs                pulses full and equal, no bruits auscultated                                        GI:  abdomen soft, non-tender, BS normoactive, no mass, no HSM, no bruits        Extremities and Muscular Skeletal:  no deformities, clubbing, cyanosis, erythema observed;no edema              Neurological:  no gross motor deficits;affect appropriate        Psych:  Alert and Oriented x 3        CC  Matilde Christopher  [facility] [address] [place] [address]                Thank you for allowing me to participate in the care of your patient.      Sincerely,     Juan Carlos Zuleta MD     [facility]    cc:   Matilde [facility]  [address] [place] [address]

## 2019-03-07 NOTE — PROGRESS NOTES
HPI and Plan:   See dictation:[id number]    Orders Placed This Encounter   Procedures     Follow-Up with Cardiologist       No orders of the defined types were placed in this encounter.      Medications Discontinued During This Encounter   Medication Reason     benzonatate (TESSALON) 100 MG capsule Therapy completed     albuterol (PROAIR HFA/PROVENTIL HFA/VENTOLIN HFA) 108 (90 Base) MCG/ACT inhaler Therapy completed     acetaminophen (TYLENOL) 325 MG tablet Therapy completed         Encounter Diagnoses   Name Primary?     ACS (acute coronary syndrome) (H) Yes     NSTEMI (non-ST elevated myocardial infarction) (H)      Spontaneous dissection of coronary artery      Palpitations        CURRENT MEDICATIONS:  Current Outpatient Medications   Medication Sig Dispense Refill     aspirin 81 MG EC tablet Take 1 tablet (81 mg) by mouth daily 30 tablet 1     clopidogrel (PLAVIX) 75 MG tablet Take 1 tablet (75 mg) by mouth daily 90 tablet 3     lisinopril (PRINIVIL/ZESTRIL) 10 MG tablet Take 1 tablet (10 mg) by mouth daily 90 tablet 3       ALLERGIES     Allergies   Allergen Reactions     Atorvastatin      Other reaction(s): Myalgias     Metoprolol Palpitations       PAST MEDICAL HISTORY:  Past Medical History:   Diagnosis Date     ACS (acute coronary syndrome) (H) 10/13/2018     Anemia 4/16/2007     Problem list name updated by automated process. Provider to review     CAD (coronary artery disease)     nonobstructive     Diverticulosis      Family history of other cardiovascular diseases 9/23/2002     Problem list name updated by automated process. Provider to review and confirm Problem list name updated by automated process. Provider to review     GERD (gastroesophageal reflux disease)      HTN (hypertension)      NSTEMI (non-ST elevated myocardial infarction) (H) 10/13/2018     Palpitations      Spina bifida occulta 10/23/2002     Spontaneous dissection of coronary artery        PAST SURGICAL HISTORY:  Past Surgical History:    Procedure Laterality Date     GYN SURGERY       HYSTERECTOMY       INCISION OF HYMEN  1980     OPEN REDUCTION INTERNAL FIXATION WRIST Right 9/11/2017    Procedure: OPEN REDUCTION INTERNAL FIXATION WRIST;  Open reduction and internal fixation right intra-articular displaced distal radius fracture;  Surgeon: Santi Villarreal MD;  Location: [facility] OR     REMOVE HARDWARE UPPER EXTREMITY Right 2/2/2018    Procedure: REMOVE HARDWARE UPPER EXTREMITY;  Removal of deep implant, right distal radius;  Surgeon: Santi Villarreal MD;  Location: [facility] OR     [place] teeth surgery  1980       FAMILY HISTORY:  Family History   Problem Relation Age of Onset     Heart Disease Father         cab     Lipids Father      Cancer Maternal Grandmother         cancer  Breast       SOCIAL HISTORY:  Social History     Socioeconomic History     Marital status:      Spouse name: John     Number of children: 3     Years of education: 15     Highest education level: None   Occupational History     Occupation: Teacher     Employer:    Social Needs     Financial resource strain: None     Food insecurity:     Worry: None     Inability: None     Transportation needs:     Medical: None     Non-medical: None   Tobacco Use     Smoking status: Never Smoker     Smokeless tobacco: Never Used   Substance and Sexual Activity     Alcohol use: No     Drug use: No     Sexual activity: Yes     Partners: Male     Birth control/protection: Condom   Lifestyle     Physical activity:     Days per week: None     Minutes per session: None     Stress: None   Relationships     Social connections:     Talks on phone: None     Gets together: None     Attends Buddhism service: None     Active member of club or organization: None     Attends meetings of clubs or organizations: None     Relationship status: None     Intimate partner violence:     Fear of current or ex partner: None     Emotionally abused: None     Physically abused: None     Forced sexual activity:  "None   Other Topics Concern      Service Not Asked     Blood Transfusions Not Asked     Caffeine Concern Not Asked     Occupational Exposure Not Asked     Hobby Hazards Not Asked     Sleep Concern Not Asked     Stress Concern Not Asked     Weight Concern Not Asked     Special Diet Not Asked     Back Care Not Asked     Exercise Yes     Bike Helmet Not Asked     Seat Belt Yes     Self-Exams Yes     Parent/sibling w/ CABG, MI or angioplasty before 65F 55M? Not Asked   Social History Narrative     None       Review of Systems:  Skin:  Positive for bruising     Eyes:  Positive for glasses    ENT:  Positive for   lots of nose bleeds, nose bleeds all of thew time   Respiratory:  Negative       Cardiovascular:  Negative      Gastroenterology: Negative      Genitourinary:  Negative      Musculoskeletal:  Negative      Neurologic:  Negative      Psychiatric:  Negative      Heme/Lymph/Imm:  Positive for easy bruising    Endocrine:  Negative        Physical Exam:  Vitals: /80 (BP Location: Right arm, Patient Position: Sitting, Cuff Size: Adult Large)   Pulse 67   Ht 1.676 m (5' 6\")   Wt 64.9 kg (143 lb)   LMP 06/15/2003   SpO2 99%   BMI 23.08 kg/m      Constitutional:  cooperative, alert and oriented, well developed, well nourished, in no acute distress        Skin:  warm and dry to the touch, no apparent skin lesions or masses noted          Head:  normocephalic, no masses or lesions        Eyes:  pupils equal and round;conjunctivae and lids unremarkable;sclera white;no xanthalasma;EOMS intact        Lymph:      ENT:  no pallor or cyanosis, dentition good        Neck:  carotid pulses are full and equal bilaterally, JVP normal, no carotid bruit        Respiratory:  normal breath sounds, clear to auscultation, normal A-P diameter, normal symmetry, normal respiratory excursion, no use of accessory muscles         Cardiac: regular rhythm, normal S1/S2, no S3 or S4, apical impulse not displaced, no murmurs, " gallops or rubs                pulses full and equal, no bruits auscultated                                        GI:  abdomen soft, non-tender, BS normoactive, no mass, no HSM, no bruits        Extremities and Muscular Skeletal:  no deformities, clubbing, cyanosis, erythema observed;no edema              Neurological:  no gross motor deficits;affect appropriate        Psych:  Alert and Oriented x 3        CC  Matildehoney Christopher  [facility]  [address] [place] [address]

## 2019-04-04 ENCOUNTER — HOSPITAL ENCOUNTER (OUTPATIENT)
Facility: CLINIC | Age: 59
Setting detail: OBSERVATION
Discharge: HOME | End: 2019-04-05
Attending: EMERGENCY MEDICINE | Admitting: INTERNAL MEDICINE
Payer: COMMERCIAL

## 2019-04-04 ENCOUNTER — APPOINTMENT (OUTPATIENT)
Dept: GENERAL RADIOLOGY | Facility: CLINIC | Age: 59
End: 2019-04-04
Attending: EMERGENCY MEDICINE
Payer: COMMERCIAL

## 2019-04-04 ENCOUNTER — HOSPITAL ENCOUNTER (EMERGENCY)
Facility: CLINIC | Age: 59
End: 2019-04-04
Payer: COMMERCIAL

## 2019-04-04 DIAGNOSIS — R00.2 PALPITATIONS: ICD-10-CM

## 2019-04-04 DIAGNOSIS — R07.9 ACUTE CHEST PAIN: ICD-10-CM

## 2019-04-04 LAB
ANION GAP SERPL CALCULATED.3IONS-SCNC: 4 MMOL/L (ref 3–14)
BASOPHILS # BLD AUTO: 0.1 10E9/L (ref 0–0.2)
BASOPHILS NFR BLD AUTO: 1.3 %
BUN SERPL-MCNC: 15 MG/DL (ref 7–30)
CALCIUM SERPL-MCNC: 9.5 MG/DL (ref 8.5–10.1)
CHLORIDE SERPL-SCNC: 108 MMOL/L (ref 94–109)
CO2 SERPL-SCNC: 29 MMOL/L (ref 20–32)
CREAT SERPL-MCNC: 0.83 MG/DL (ref 0.52–1.04)
DIFFERENTIAL METHOD BLD: NORMAL
EOSINOPHIL # BLD AUTO: 0.4 10E9/L (ref 0–0.7)
EOSINOPHIL NFR BLD AUTO: 5.8 %
ERYTHROCYTE [DISTWIDTH] IN BLOOD BY AUTOMATED COUNT: 13.1 % (ref 10–15)
GFR SERPL CREATININE-BSD FRML MDRD: 78 ML/MIN/{1.73_M2}
GLUCOSE SERPL-MCNC: 95 MG/DL (ref 70–99)
HCT VFR BLD AUTO: 40.9 % (ref 35–47)
HGB BLD-MCNC: 13 G/DL (ref 11.7–15.7)
IMM GRANULOCYTES # BLD: 0 10E9/L (ref 0–0.4)
IMM GRANULOCYTES NFR BLD: 0.2 %
LYMPHOCYTES # BLD AUTO: 1.6 10E9/L (ref 0.8–5.3)
LYMPHOCYTES NFR BLD AUTO: 24.4 %
MCH RBC QN AUTO: 28.4 PG (ref 26.5–33)
MCHC RBC AUTO-ENTMCNC: 31.8 G/DL (ref 31.5–36.5)
MCV RBC AUTO: 90 FL (ref 78–100)
MONOCYTES # BLD AUTO: 0.6 10E9/L (ref 0–1.3)
MONOCYTES NFR BLD AUTO: 9.1 %
NEUTROPHILS # BLD AUTO: 3.8 10E9/L (ref 1.6–8.3)
NEUTROPHILS NFR BLD AUTO: 59.2 %
NRBC # BLD AUTO: 0 10*3/UL
NRBC BLD AUTO-RTO: 0 /100
NT-PROBNP SERPL-MCNC: 432 PG/ML (ref 0–900)
PLATELET # BLD AUTO: 253 10E9/L (ref 150–450)
POTASSIUM SERPL-SCNC: 3.6 MMOL/L (ref 3.4–5.3)
RBC # BLD AUTO: 4.57 10E12/L (ref 3.8–5.2)
SODIUM SERPL-SCNC: 141 MMOL/L (ref 133–144)
TROPONIN I BLD-MCNC: 0 UG/L (ref 0–0.08)
TROPONIN I SERPL-MCNC: <0.015 UG/L (ref 0–0.04)
WBC # BLD AUTO: 6.4 10E9/L (ref 4–11)

## 2019-04-04 PROCEDURE — 93005 ELECTROCARDIOGRAM TRACING: CPT

## 2019-04-04 PROCEDURE — 84484 ASSAY OF TROPONIN QUANT: CPT

## 2019-04-04 PROCEDURE — 99285 EMERGENCY DEPT VISIT HI MDM: CPT | Mod: 25

## 2019-04-04 PROCEDURE — 71046 X-RAY EXAM CHEST 2 VIEWS: CPT

## 2019-04-04 PROCEDURE — 85025 COMPLETE CBC W/AUTO DIFF WBC: CPT | Performed by: EMERGENCY MEDICINE

## 2019-04-04 PROCEDURE — 25000132 ZZH RX MED GY IP 250 OP 250 PS 637: Performed by: EMERGENCY MEDICINE

## 2019-04-04 PROCEDURE — 80048 BASIC METABOLIC PNL TOTAL CA: CPT | Performed by: EMERGENCY MEDICINE

## 2019-04-04 PROCEDURE — 84484 ASSAY OF TROPONIN QUANT: CPT | Performed by: EMERGENCY MEDICINE

## 2019-04-04 PROCEDURE — 83880 ASSAY OF NATRIURETIC PEPTIDE: CPT | Performed by: EMERGENCY MEDICINE

## 2019-04-04 RX ORDER — ASPIRIN 81 MG/1
324 TABLET, CHEWABLE ORAL ONCE
Status: COMPLETED | OUTPATIENT
Start: 2019-04-04 | End: 2019-04-04

## 2019-04-04 RX ORDER — ASPIRIN 325 MG
325 TABLET ORAL ONCE
Status: DISCONTINUED | OUTPATIENT
Start: 2019-04-04 | End: 2019-04-04

## 2019-04-04 RX ADMIN — ASPIRIN 81 MG 324 MG: 81 TABLET ORAL at 22:47

## 2019-04-04 ASSESSMENT — MIFFLIN-ST. JEOR: SCORE: 1273.07

## 2019-04-05 ENCOUNTER — APPOINTMENT (OUTPATIENT)
Dept: CARDIOLOGY | Facility: CLINIC | Age: 59
End: 2019-04-05
Attending: PHYSICIAN ASSISTANT
Payer: COMMERCIAL

## 2019-04-05 ENCOUNTER — APPOINTMENT (OUTPATIENT)
Dept: NUCLEAR MEDICINE | Facility: CLINIC | Age: 59
End: 2019-04-05
Attending: INTERNAL MEDICINE
Payer: COMMERCIAL

## 2019-04-05 VITALS
HEART RATE: 88 BPM | BODY MASS INDEX: 24.27 KG/M2 | DIASTOLIC BLOOD PRESSURE: 84 MMHG | TEMPERATURE: 95.7 F | RESPIRATION RATE: 16 BRPM | WEIGHT: 151 LBS | HEIGHT: 66 IN | OXYGEN SATURATION: 98 % | SYSTOLIC BLOOD PRESSURE: 130 MMHG

## 2019-04-05 PROBLEM — R07.9 CHEST PAIN: Status: ACTIVE | Noted: 2019-04-05

## 2019-04-05 LAB
CHOLEST SERPL-MCNC: 227 MG/DL
HDLC SERPL-MCNC: 77 MG/DL
INTERPRETATION ECG - MUSE: NORMAL
LDLC SERPL CALC-MCNC: 140 MG/DL
NONHDLC SERPL-MCNC: 150 MG/DL
TRIGL SERPL-MCNC: 51 MG/DL
TROPONIN I SERPL-MCNC: <0.015 UG/L (ref 0–0.04)
TROPONIN I SERPL-MCNC: <0.015 UG/L (ref 0–0.04)

## 2019-04-05 PROCEDURE — 78452 HT MUSCLE IMAGE SPECT MULT: CPT

## 2019-04-05 PROCEDURE — 93016 CV STRESS TEST SUPVJ ONLY: CPT | Performed by: INTERNAL MEDICINE

## 2019-04-05 PROCEDURE — 84484 ASSAY OF TROPONIN QUANT: CPT | Performed by: INTERNAL MEDICINE

## 2019-04-05 PROCEDURE — 99207 ZZC APP CREDIT; MD BILLING SHARED VISIT: CPT | Performed by: PHYSICIAN ASSISTANT

## 2019-04-05 PROCEDURE — 25000132 ZZH RX MED GY IP 250 OP 250 PS 637: Performed by: INTERNAL MEDICINE

## 2019-04-05 PROCEDURE — 99236 HOSP IP/OBS SAME DATE HI 85: CPT | Performed by: INTERNAL MEDICINE

## 2019-04-05 PROCEDURE — 25000128 H RX IP 250 OP 636: Performed by: INTERNAL MEDICINE

## 2019-04-05 PROCEDURE — 0298T ZIO PATCH HOLTER ADULT PEDIATRIC GREATER THAN 48 HRS: CPT | Performed by: INTERNAL MEDICINE

## 2019-04-05 PROCEDURE — 78452 HT MUSCLE IMAGE SPECT MULT: CPT | Mod: 26 | Performed by: INTERNAL MEDICINE

## 2019-04-05 PROCEDURE — G0378 HOSPITAL OBSERVATION PER HR: HCPCS

## 2019-04-05 PROCEDURE — 0296T ZIO PATCH HOLTER ADULT PEDIATRIC GREATER THAN 48 HRS: CPT

## 2019-04-05 PROCEDURE — 93018 CV STRESS TEST I&R ONLY: CPT | Performed by: INTERNAL MEDICINE

## 2019-04-05 PROCEDURE — 80061 LIPID PANEL: CPT | Performed by: INTERNAL MEDICINE

## 2019-04-05 PROCEDURE — 36415 COLL VENOUS BLD VENIPUNCTURE: CPT | Performed by: INTERNAL MEDICINE

## 2019-04-05 PROCEDURE — 96374 THER/PROPH/DIAG INJ IV PUSH: CPT | Mod: 59

## 2019-04-05 PROCEDURE — 93017 CV STRESS TEST TRACING ONLY: CPT

## 2019-04-05 PROCEDURE — 34300033 ZZH RX 343: Performed by: INTERNAL MEDICINE

## 2019-04-05 PROCEDURE — A9502 TC99M TETROFOSMIN: HCPCS | Performed by: INTERNAL MEDICINE

## 2019-04-05 PROCEDURE — 99207 ZZC CDG-CODE CATEGORY CHANGED: CPT | Performed by: INTERNAL MEDICINE

## 2019-04-05 RX ORDER — NALOXONE HYDROCHLORIDE 0.4 MG/ML
.1-.4 INJECTION, SOLUTION INTRAMUSCULAR; INTRAVENOUS; SUBCUTANEOUS
Status: DISCONTINUED | OUTPATIENT
Start: 2019-04-05 | End: 2019-04-05 | Stop reason: HOSPADM

## 2019-04-05 RX ORDER — HYDRALAZINE HYDROCHLORIDE 20 MG/ML
10 INJECTION INTRAMUSCULAR; INTRAVENOUS EVERY 6 HOURS PRN
Status: DISCONTINUED | OUTPATIENT
Start: 2019-04-05 | End: 2019-04-05 | Stop reason: HOSPADM

## 2019-04-05 RX ORDER — ASPIRIN 81 MG/1
81 TABLET ORAL DAILY
Status: DISCONTINUED | OUTPATIENT
Start: 2019-04-05 | End: 2019-04-05 | Stop reason: HOSPADM

## 2019-04-05 RX ORDER — ONDANSETRON 4 MG/1
4 TABLET, ORALLY DISINTEGRATING ORAL EVERY 6 HOURS PRN
Status: DISCONTINUED | OUTPATIENT
Start: 2019-04-05 | End: 2019-04-05 | Stop reason: HOSPADM

## 2019-04-05 RX ORDER — LISINOPRIL 10 MG/1
10 TABLET ORAL DAILY
Status: DISCONTINUED | OUTPATIENT
Start: 2019-04-05 | End: 2019-04-05

## 2019-04-05 RX ORDER — LISINOPRIL 20 MG/1
20 TABLET ORAL DAILY
Status: DISCONTINUED | OUTPATIENT
Start: 2019-04-05 | End: 2019-04-05 | Stop reason: HOSPADM

## 2019-04-05 RX ORDER — CLOPIDOGREL BISULFATE 75 MG/1
75 TABLET ORAL DAILY
Status: DISCONTINUED | OUTPATIENT
Start: 2019-04-05 | End: 2019-04-05 | Stop reason: HOSPADM

## 2019-04-05 RX ORDER — ACETAMINOPHEN 325 MG/1
650 TABLET ORAL EVERY 4 HOURS PRN
Status: DISCONTINUED | OUTPATIENT
Start: 2019-04-05 | End: 2019-04-05 | Stop reason: HOSPADM

## 2019-04-05 RX ORDER — ONDANSETRON 2 MG/ML
4 INJECTION INTRAMUSCULAR; INTRAVENOUS EVERY 6 HOURS PRN
Status: DISCONTINUED | OUTPATIENT
Start: 2019-04-05 | End: 2019-04-05 | Stop reason: HOSPADM

## 2019-04-05 RX ORDER — ACETAMINOPHEN 650 MG/1
650 SUPPOSITORY RECTAL EVERY 4 HOURS PRN
Status: DISCONTINUED | OUTPATIENT
Start: 2019-04-05 | End: 2019-04-05 | Stop reason: HOSPADM

## 2019-04-05 RX ORDER — HYDROXYZINE HYDROCHLORIDE 25 MG/1
25 TABLET, FILM COATED ORAL ONCE
Status: DISCONTINUED | OUTPATIENT
Start: 2019-04-05 | End: 2019-04-05 | Stop reason: HOSPADM

## 2019-04-05 RX ORDER — LORAZEPAM 0.5 MG/1
0.5 TABLET ORAL ONCE
Status: DISCONTINUED | OUTPATIENT
Start: 2019-04-05 | End: 2019-04-05

## 2019-04-05 RX ADMIN — CLOPIDOGREL BISULFATE 75 MG: 75 TABLET, FILM COATED ORAL at 08:03

## 2019-04-05 RX ADMIN — TETROFOSMIN 33 MCI.: 1.38 INJECTION, POWDER, LYOPHILIZED, FOR SOLUTION INTRAVENOUS at 08:46

## 2019-04-05 RX ADMIN — LISINOPRIL 20 MG: 20 TABLET ORAL at 08:03

## 2019-04-05 RX ADMIN — TETROFOSMIN 10.5 MCI.: 1.38 INJECTION, POWDER, LYOPHILIZED, FOR SOLUTION INTRAVENOUS at 07:18

## 2019-04-05 RX ADMIN — HYDRALAZINE HYDROCHLORIDE 10 MG: 20 INJECTION INTRAMUSCULAR; INTRAVENOUS at 02:24

## 2019-04-05 ASSESSMENT — ENCOUNTER SYMPTOMS
CHEST TIGHTNESS: 1
PALPITATIONS: 1

## 2019-04-05 ASSESSMENT — MIFFLIN-ST. JEOR: SCORE: 1286.68

## 2019-04-05 NOTE — PHARMACY-ADMISSION MEDICATION HISTORY
Admission medication history interview status for this patient is complete. See [facility] admission navigator for allergy information, prior to admission medications and immunization status.     Medication history interview source(s):Patient  Medication history resources (including written lists, pill bottles, clinic record):None    Changes made to PTA medication list:  Added: none  Deleted: none  Changed: none    Actions taken by pharmacist (provider contacted, etc):None     Additional medication history information:None    Medication reconciliation/reorder completed by provider prior to medication history? No    For patients on insulin therapy: no (Yes/No)   Lantus/levemir/NPH/Mix 70/30 dose: ___ in AM/PM or twice daily   Sliding scale Novolog Y/N   If Yes, do you have a baseline novolog pre-meal dose: ______units with meals   Patients eat three meals a day: Y/N ---  How many episodes of hypoglycemia (low blood glucose) do you have weekly: ---   How many missed doses do you have a week: ---  How many times do you check your blood glucose per day: ---  Any Barriers to therapy: cost of medications/comfortable with giving injections (if applicable)/ comfortable and confident with current diabetes regimen ---      Prior to Admission medications    Medication Sig Last Dose Taking? Auth Provider   aspirin 81 MG EC tablet Take 1 tablet (81 mg) by mouth daily 4/4/2019 at am Yes Jose Cruz Fragoso MD   clopidogrel (PLAVIX) 75 MG tablet Take 1 tablet (75 mg) by mouth daily 4/4/2019 at am Yes Silvia Grace PA-C   lisinopril (PRINIVIL/ZESTRIL) 10 MG tablet Take 1 tablet (10 mg) by mouth daily 4/4/2019 at am Yes Silvia Grace PA-C

## 2019-04-05 NOTE — DISCHARGE SUMMARY
"[facility] Observation Unit Discharge Summary          Desire Moise MRN# [id number]   Age: 58 year old YOB: 1960     Date of Admission:  4/4/2019  Date of Discharge::  4/5/2019  Admitting Physician:  Trevor Benjamin MD  Discharge Physician:  Karen Bell PA-C  Primary Physician: Matilde Christopher     Primary Discharge Diagnoses:   Palpitations     Secondary Discharge Diagnoses:   Anxiety  Nonobstructive CAD  NSTEMI  Spontaneous Dissection of Coronary Artery  Diverticulosis  GERD  HTN     Hospital Course:   For detail history, please refer to H & P from 4/4/2019. In brief, this is a 58 year old female \"who presents with palpitations and high blood pressure.       Patient has a history of coronary artery dissection (OM 1 branch) back in October.  At that time she was admitted for chest pain and elevated troponin.  She was transferred to the [facility] for an angiogram which showed no coronary artery dissection.  No stenting was done and patient was kept on aspirin and Plavix.  She is not on a statin due to myalgias with atorvastatin.     According to the patient she did not tolerate beta-blocker because it was causing tachycardia with palpitations.  She takes aspirin, Plavix and lisinopril.     The main reason she came to the emergency room was for her palpitations which she has been experiencing over the last 3 days.  No obvious trigger.  They may come on when she is resting and not doing any exertion.  The palpitations are not constant and they are off and on.  She has also noticed elevated blood pressure over the last couple of days.  Usually her blood pressure runs within the normal range.  She did not have any chest pain/chest pressure at home.     She came to the emergency room to get herself checked and while she was in the emergency room she started experiencing the chest pressure.  No radiation.  It is more of her chest pressure than a pain.  Started while she was " "resting in the emergency room. Nonpleuritic in nature.  She also experienced mild shortness of breath along with the chest pressure.\"    Patient was admitted to the observation unit.  She had no further chest pain.  Her blood pressure normalized.  She reported intermittent palpitations.  She denied shortness of breath.  Telemetry monitoring revealed Sinus Shaggy to Sinus tach to 100.  Serial Troponin levels were negative, BNP was wnl.  NM Lexiscan stress test demonstrated normal perfusion and normal wall motion with EF 72%.  Patient has a history of paradoxical reaction to beta blockers and has been taken off Metoprolol.  She reported extreme sensitivity to medications.  A Zio patch was placed for two weeks and a follow up appointment with her Cardiologist on 4/22/19 to discuss further medication management options.  Patient's lipid panel was abnormal.  This was relayed to the patient and she reported a history of intolerance to Atorvastatin.  Patient will follow up with PCP this week to discuss starting a different statin.  Patient also expressed recent Anxiety which I instructed her to follow up with PCP next week to discuss further.    Procedures/Imaging:     Results for orders placed or performed during the hospital encounter of 04/04/19   XR Chest 2 Views    Narrative    XR CHEST 2 VW   4/4/2019 10:57 PM     INDICATION: Chest pain.    COMPARISON: 1/9/2019.      Impression    IMPRESSION: No infiltrates or other acute findings. Heart size is  within normal limits.    KEEGAN COLLINS MD   NM MPI Multi Rest Stress    Narrative    GATED MYOCARDIAL PERFUSION SCINTIGRAPHY EXERCISE- ONE DAY STUDY     4/5/2019 9:33 AM  MIGUEL LACKEY  58 years  Female  1960.    Indication/Clinical History: Chest pain    Impression  1.  Myocardial perfusion imaging using single isotope technique  demonstrated normal perfusion.   2. Gated images demonstrated normal wall motion.  The left ventricular  systolic function is " calculated at 72%.  3. There is no prior nuclear study available for comparison.    Procedure  The patient performed treadmill exercise using a Henrique protocol,  completing 9 minutes and 40 seconds with an estimated workload of 11.2  METS.  The test was terminated due to fatigue. The heart rate was 91  beats per minute at baseline and increased to 171 beats at peak  exercise, which was 104% of the maximum predicted heart rate. The rest  blood pressure was 138/78 mm/Hg and peak blood pressure is 150/70mm/Hg  with rate pressure product of 25,650. The patient experienced atypical  chest pain during the test. The patient was not on a beta blocker.    Myocardial perfusion imaging was performed at rest, approximately 45  minutes after the injection intravenously of 10.5of Tc-99m Myoview. At  peak exercise, the patient was injected intravenously with 33.0 mCi of   Tc-99m Myoview and exercise continued for approximately 1 minute.  Gated post-stress tomographic imaging was performed approximately 30  minutes after stress    EKG Findings  The resting EKG demonstrated sinus rhythm with inferior Q waves and  inferolateral ST depression . The stress EKG demonstrated no  significant worsening of the inferolateral ST depression with  exercise.    Tomographic Findings  Overall, the study quality is excellent . On the stress images, there  is near uniform tracer uptake noted. On the rest images, there is near  uniform tracer uptake noted . Gated images demonstrated normal wall  motion. The left ventricular ejection fraction was calculated to be  72%. TID was 1.03.    AALIYAH GUPTA MD     Consultations:   None    Code Status:   Full    Allergies:     Allergies   Allergen Reactions     Atorvastatin      Other reaction(s): Myalgias     Metoprolol Palpitations        Subjective:   Patient denies any further chest pressure.  She denies shortness of breath.  She reports feeling intermittent palpitations.  Denies abdominal pain.  Reports  "she felt anxious last night.  Feels at her baseline currently.  She described a history of sensitivity to beta blockers, statins, Ativan.     Physical Exam:   Blood pressure 130/84, pulse 88, temperature 95.7  F (35.4  C), temperature source Oral, resp. rate 16, height 1.676 m (5' 6\"), weight 68.5 kg (151 lb), last menstrual period 06/15/2003, SpO2 98 %, not currently breastfeeding.  General: Alert, interactive, NAD, sitting up in bed  Resp: clear to auscultation bilaterally, no crackles or wheezes  Cardiac: regular rate and rhythm, no murmur  Abdomen: Soft, nontender, nondistended. +BS.  No rebound or guarding.  Extremities: No LE edema  Skin: Warm and dry, no jaundice or rash  Neuro: Alert & oriented x 3, no focal deficits, moves all extremities equally     Discharge Medicatios:        Discharge Medication List as of 4/5/2019 11:57 AM      CONTINUE these medications which have NOT CHANGED    Details   aspirin 81 MG EC tablet Take 1 tablet (81 mg) by mouth daily, Disp-30 tablet, R-1, E-Prescribe      clopidogrel (PLAVIX) 75 MG tablet Take 1 tablet (75 mg) by mouth daily, Disp-90 tablet, R-3, E-Prescribe      lisinopril (PRINIVIL/ZESTRIL) 10 MG tablet Take 1 tablet (10 mg) by mouth daily, Disp-90 tablet, R-3, E-Prescribe             Instructions Given to Patient as Discharge:     Discharge Procedure Orders   Reason for your hospital stay   Order Comments: You were hospitalized due to palpitations and chest pressure.  Your chest pressure resolved.  Your heart monitoring revealed sinus rhythm.   You had a NM Lexiscan stress test which revealed normal perfusion and normal wall motion of your heart with EF of 72%.     Follow-up and recommended labs and tests    Order Comments: A Zio patch monitor was placed for 14 days.  Please return this and follow up with your Cardiologist for results.  Your cholesterol levels were elevated and you have been intolerant of statins in the past.  Please follow up with your PCP within " one week and Cardiologist to consider trying another statin.     Activity   Order Comments: Your activity upon discharge: activity as tolerated     Order Specific Question Answer Comments   Is discharge order? Yes      When to contact your care team   Order Comments: Notify for fever >101.5; black or bloody stools; severe, persistent, or worsening nausea, vomiting, abdominal pain or distention, diarrhea, constipation; chest pain, difficulty breathing, swelling; dizziness, weakness, lightheadedness, fainting.  Proceed to the nearest emergency room for any urgent concerns.     Diet   Order Comments: Follow this diet upon discharge: Orders Placed This Encounter      Regular Diet Adult     Order Specific Question Answer Comments   Is discharge order? Yes        Pending Tests at Discharge:   Zio patch    Discharge Disposition:   Discharged to home     Karen PAYNE-C  Hospitalist Service  Pager [phone]    >30 minutes was spent in discharge planning, care coordination, physical examination and medication reconciliation on the date of discharge, 4/5/2019

## 2019-04-05 NOTE — PROGRESS NOTES
Patient referred by RN for Healing Touch due to anxiety.  Healing Touch explained to patient and patient consented to HT session.  Pain prior to session denies pain, anxiety prior to session 10/10. Performed HT with music  Pain post session denies anxiety 2/10 post session. Time spent with patient 60 minutes.    John SHERIDAN RN-BC HNB-BC HTP

## 2019-04-05 NOTE — PLAN OF CARE
PRIMARY DIAGNOSIS: CHEST PAIN  OUTPATIENT/OBSERVATION GOALS TO BE MET BEFORE DISCHARGE:  1. Ruled out acute coronary syndrome (negative or stable Troponin):  Yes  2. Pain Status: Pain free.  3. Appropriate provocative testing performed: Yes  - Stress Test Procedure: Nuclear Stress test    - Interpretation of cardiac rhythm per telemetry tech: Sinus Shaggy 55-65    4. Cleared by Consultants (if applicable):N/A  5. Return to near baseline physical activity: Yes  Patient is alert and oriented x4. VSS besides BP elevated throughout the night. PRN Hydralazine given. 30 mins after Hydralazine patient called with ANA. MD notified. This episode has subsided. Denies pain. Ind in room. Tele monitoring, Sinus Shaggy 55-65. NPO. Nuclear stress test this AM.   Discharge Planner Nurse   Safe discharge environment identified: Yes  Barriers to discharge: No       Entered by: Mahnaz Fay 04/05/2019 4:23 AM     Please review provider order for any additional goals.   Nurse to notify provider when observation goals have been met and patient is ready for discharge.

## 2019-04-05 NOTE — PLAN OF CARE
ROOM #205    Living Situation (if not independent, order SW consult): Home with    Facility name:  : [facility] ()     Activity level at baseline: Ind   Activity level on admit: Ind       Patient registered to observation; given Patient Bill of Rights; given the opportunity to ask questions about observation status and their plan of care.  Patient has been oriented to the observation room, bathroom and call light is in place.    Discussed discharge goals and expectations with patient/family.

## 2019-04-05 NOTE — PLAN OF CARE
PRIMARY DIAGNOSIS: CHEST PAIN  OUTPATIENT/OBSERVATION GOALS TO BE MET BEFORE DISCHARGE:  1. Ruled out acute coronary syndrome (negative or stable Troponin):  Yes  2. Pain Status: Pain free.  3. Appropriate provocative testing performed: Down for stress test now  - Stress Test Procedure: Nuclear  - Interpretation of cardiac rhythm per telemetry tech: SR/SB    4. Cleared by Consultants (if applicable):N/A  5. Return to near baseline physical activity: Yes  Discharge Planner Nurse   Safe discharge environment identified: Yes  Barriers to discharge: Yes       Entered by: Gloria Higgins 04/05/2019    Trops-neg x3, SR/SB per tele. Chest XR-neg. Denies chest pain/SOB/palpitations/diaphoresis/N/V. Plan for stress test today. VSS.   Please review provider order for any additional goals.   Nurse to notify provider when observation goals have been met and patient is ready for discharge.

## 2019-04-05 NOTE — PLAN OF CARE
Patient's After Visit Summary was reviewed with patient and/or spouse.   Patient verbalized understanding of After Visit Summary, recommended follow up and was given an opportunity to ask questions.   Discharge medications sent home with patient/family: No    Discharged with spouse    OBSERVATION patient END time: 12:40  VSS. Zio patch applied. Ambulated out

## 2019-04-05 NOTE — H&P
[facility]    History and Physical  Hospitalist       Date of Admission:  4/4/2019    Assessment & Plan   Desire Moise is a 58 year old female who presents with palpitations and high blood pressure.      Patient has a history of coronary artery dissection (OM 1 branch) back in October.  At that time she was admitted for chest pain and elevated troponin.  She was transferred to the [facility] for an angiogram which showed no coronary artery dissection.  No stenting was done and patient was kept on aspirin and Plavix.  She is not on a statin due to myalgias with atorvastatin.    According to the patient she did not tolerate beta-blocker because it was causing tachycardia with palpitations.  She takes aspirin, Plavix and lisinopril.    The main reason she came to the emergency room was for her palpitations which she has been experiencing over the last 3 days.  No obvious trigger.  They may come on when she is resting and not doing any exertion.  The palpitations are not constant and they are off and on.  She has also noticed elevated blood pressure over the last couple of days.  Usually her blood pressure runs within the normal range.  She did not have any chest pain/chest pressure at home.    She came to the emergency room to get herself checked and while she was in the emergency room she started experiencing the chest pressure.  No radiation.  It is more of her chest pressure than a pain.  Started while she was resting in the emergency room. Nonpleuritic in nature.  She also experienced mild shortness of breath along with the chest pressure.    Initially she was tachycardic in the emergency room but currently her heart rate is around 70.  Currently she is completely chest pain-free.    Because of her coronary artery history and this chest pressure, the ER physician Dr. Salas asked me to admit the patient.  He also spoke to the on-call cardiologist who recommended getting a stress  test.    Problem List:    Chest pressure.  In the setting of her history of coronary artery dissection in the past.  - Her initial troponin and EKG was unremarkable.  - Trend the troponins.  Keep her on aspirin and Plavix as before.  -Nuclear stress test in the morning as long as her troponins are negative.  -Lipid panels with a.m. labs.    Recent palpitations.  -In the past she has not tolerated the beta-blocker and they have paradoxically caused palpitations and tachycardia.  - Her last TSH in October was normal.  -She may benefit from an event monitor.  She told me that in the past she had a 7-day event monitor which did not reveal any arrhythmia (according to her).  -Her chest pressure may be related to her palpitations/arrhythmias.  -Keep her on telemetry monitoring.    Hypertension  -Currently her blood pressure is elevated.  This is probably physiological in the setting of the underlying stress.  We do not need to bring the blood pressure down rapidly.  Allow the blood pressure to trend down slowly.  -Resume her lisinopril.  Monitor.    I discussed the plan at length with the patient and patient's .  Answered all their questions.    DVT Prophylaxis: Low Risk/Ambulatory with no VTE prophylaxis indicated  Code Status: Full Code    Trevor Benjamin MD    Primary Care Physician   Matilde Christopher    Chief Complaint   Palpitations.  Chest pain.      History of Present Illness   Desire Moise is a 58 year old female presented to the emergency room with palpitations and also chest pressure after coming to the ER.  Please see the details as above.      Past Medical History    I have reviewed this patient's medical history and updated it with pertinent information if needed.   Past Medical History:   Diagnosis Date     ACS (acute coronary syndrome) (H) 10/13/2018     Anemia 4/16/2007     Problem list name updated by automated process. Provider to review     CAD (coronary artery disease)     nonobstructive      Diverticulosis      Family history of other cardiovascular diseases 9/23/2002     Problem list name updated by automated process. Provider to review and confirm Problem list name updated by automated process. Provider to review     GERD (gastroesophageal reflux disease)      HTN (hypertension)      NSTEMI (non-ST elevated myocardial infarction) (H) 10/13/2018     Palpitations      Spina bifida occulta 10/23/2002     Spontaneous dissection of coronary artery        Past Surgical History   I have reviewed this patient's surgical history and updated it with pertinent information if needed.  Past Surgical History:   Procedure Laterality Date     GYN SURGERY       HYSTERECTOMY       INCISION OF HYMEN  1980     OPEN REDUCTION INTERNAL FIXATION WRIST Right 9/11/2017    Procedure: OPEN REDUCTION INTERNAL FIXATION WRIST;  Open reduction and internal fixation right intra-articular displaced distal radius fracture;  Surgeon: Santi Villarreal MD;  Location: [facility] OR     REMOVE HARDWARE UPPER EXTREMITY Right 2/2/2018    Procedure: REMOVE HARDWARE UPPER EXTREMITY;  Removal of deep implant, right distal radius;  Surgeon: Santi Villarreal MD;  Location: [facility] OR     [place] teeth surgery  1980       Prior to Admission Medications   Prior to Admission Medications   Prescriptions Last Dose Informant Patient Reported? Taking?   aspirin 81 MG EC tablet 4/4/2019 at am  No Yes   Sig: Take 1 tablet (81 mg) by mouth daily   clopidogrel (PLAVIX) 75 MG tablet 4/4/2019 at am  No Yes   Sig: Take 1 tablet (75 mg) by mouth daily   lisinopril (PRINIVIL/ZESTRIL) 10 MG tablet 4/4/2019 at am  No Yes   Sig: Take 1 tablet (10 mg) by mouth daily      Facility-Administered Medications: None     Allergies   Allergies   Allergen Reactions     Atorvastatin      Other reaction(s): Myalgias     Metoprolol Palpitations       Social History   I have reviewed this patient's social history and updated it with pertinent information if needed. Desire Moise   reports that  has never smoked. she has never used smokeless tobacco. She reports that she does not drink alcohol or use drugs.    Family History   I have reviewed this patient's family history and updated it with pertinent information if needed.   Family History   Problem Relation Age of Onset     Heart Disease Father         cab     Lipids Father      Cancer Maternal Grandmother         cancer  Breast       Review of Systems   The 10 point Review of Systems is negative other than noted in the HPI or here.     Physical Exam   Temp: 98.2  F (36.8  C) Temp src: Oral BP: (!) 170/101 Pulse: 65 Heart Rate: 70 Resp: 11 SpO2: 97 % O2 Device: None (Room air)    Vital Signs with Ranges  Temp:  [98.2  F (36.8  C)] 98.2  F (36.8  C)  Pulse:  [65-73] 65  Heart Rate:  [61-73] 70  Resp:  [10-20] 11  BP: (165-179)/(101-111) 170/101  SpO2:  [94 %-100 %] 97 %  148 lbs 0 oz    Constitutional: Awake, alert, cooperative, no apparent distress.  Eyes: Conjunctiva and pupils examined and normal.  HEENT: Moist mucous membranes, normal dentition.  Respiratory: Clear to auscultation bilaterally, no crackles or wheezing.  Cardiovascular: Regular rate and rhythm, normal S1 and S2, and no murmur noted.  GI: Soft, non-distended, non-tender, normal bowel sounds.  Lymph/Hematologic: No anterior cervical or supraclavicular adenopathy.  Skin: No rashes, no cyanosis, no edema.  Musculoskeletal: No joint swelling, erythema or tenderness.  Neurologic: Cranial nerves 2-12 intact, normal strength and sensation.  Psychiatric: Alert, oriented to person, place and time, no obvious anxiety or depression.    Data     Recent Labs   Lab 04/04/19  2152 04/04/19  2149   WBC  --  6.4   HGB  --  13.0   MCV  --  90   PLT  --  253   NA  --  141   POTASSIUM  --  3.6   CHLORIDE  --  108   CO2  --  29   BUN  --  15   CR  --  0.83   ANIONGAP  --  4   BENI  --  9.5   GLC  --  95   TROPI  --  <0.015   TROPONIN 0.00  --        Recent Results (from the past 24 hour(s))   XR  Chest 2 Views    Narrative    XR CHEST 2 VW   4/4/2019 10:57 PM     INDICATION: Chest pain.    COMPARISON: 1/9/2019.      Impression    IMPRESSION: No infiltrates or other acute findings. Heart size is  within normal limits.    KEEGAN COLLINS MD

## 2019-04-05 NOTE — PROGRESS NOTES
Upon arrival to the floor, patient's BP was elevated to 178/97.     MD paged. MD ordered IV Hydralazine.     Patient was given Hydralazine at 0223 for a BP . Thirty minutes after, patient reported SOB and denied chest pain. BP was 183/97. Patient was placed on O2 2L for comfort.     MD paged again. MD ordered Atarax and Lisinopril.   RN contacted John ARCE in PACU for healing touch.     Patient wanted to to try healing touch before medications. After healing touch, /94. Patient refused Atarax and Lisinopril at this time.     MD notified.

## 2019-04-05 NOTE — ED NOTES
[facility]  ED Nurse Handoff Report    Desire Moise is a 58 year old female   ED Chief complaint: Chest Pain  . ED Diagnosis:   Final diagnoses:   Acute chest pain   Palpitations     Allergies:   Allergies   Allergen Reactions     Atorvastatin      Other reaction(s): Myalgias     Metoprolol Palpitations       Code Status: Full Code  Activity level - Baseline/Home:  Independent. Activity Level - Current:   Independent. Lift room needed: No. Bariatric: No   Needed: No   Isolation: No. Infection: Not Applicable.     Vital Signs:   Vitals:    04/04/19 2247 04/04/19 2248 04/04/19 2359 04/05/19 0015   BP:       Pulse:    67   Resp: 17 16 12 17   Temp:       TempSrc:       SpO2: 97% 96% 96%    Weight:       Height:         BP: 178/102    Cardiac Rhythm:  ,   Cardiac  Cardiac Rhythm: Normal sinus rhythm  Pain level: 0-10 Pain Scale: 3  Patient confused: No. Patient Falls Risk: Yes.   Elimination Status: Has voided, ambulatory to bathroom with     Patient Report - Initial Complaint: Chest pain. Focused Assessment:   22:00 Cardiac Cardiac - Cardiac WDL: -WDL except; all Cardiac Rhythm: radial pulse regular Capillary Refill, General: less than/equal to 3 secs  Review of Systems (Cardiac) - Cardiovascular Symptoms/Conditions: chest pain; myocardial infarction; hypertension (Hx MI 5mo ago, coronary artery dissection)  Chest Pain Assessment - Rating (0-10): 4 Chest Pain Location: anterior chest, left Duration: Three days of mild L chest pain and feelings of palpitations. Pt c/o palpitations in ED while on 5-lead EKG and no ectopy observed, new onset SOB today Associated Signs/Symptoms: anxiety; hypertension (Pt denies NV and dizziness) Chest Pain Reproducible?: No  Cardiac Monitoring - EKG Monitoring: Yes Cardiac Regularity: Regular Cardiac Rhythm: NSR       Tests Performed: EKG, Labs, Xray. Abnormal Results:   Labs Ordered and Resulted from Time of ED Arrival Up to the Time of Departure from  the ED   CBC WITH PLATELETS DIFFERENTIAL   BASIC METABOLIC PANEL   TROPONIN I   NT PROBNP INPATIENT   PULSE OXIMETRY NURSING   CARDIAC CONTINUOUS MONITORING   PERIPHERAL IV CATHETER   PATIENT CARE ORDER   TROPONIN POCT     XR Chest 2 Views   Final Result   IMPRESSION: No infiltrates or other acute findings. Heart size is   within normal limits.      KEEGAN COLLINS MD      .   Treatments provided: Continuous monitoring, reassurance, emotional support.   Family Comments:  at bedside   OBS brochure/video discussed/provided to patient:  Yes  ED Medications:   Medications   aspirin (ASA) chewable tablet 324 mg (324 mg Oral Given 4/4/19 0848)     Drips infusing:  No  For the majority of the shift, the patient's behavior Green. Interventions performed were NA.     Severe Sepsis OR Septic Shock Diagnosis Present: No      ED Nurse Name/Phone Number: David Anne,   12:53 AM      RECEIVING UNIT ED HANDOFF REVIEW    Above ED Nurse Handoff Report was reviewed: Yes  Reviewed by: Mahnaz Fay on April 5, 2019 at 12:57 AM

## 2019-04-05 NOTE — ED NOTES
Pt declines additional analgesia at this time, told pt to notify staff if she developed worsening symptoms or new symptoms.

## 2019-04-05 NOTE — ED TRIAGE NOTES
Pt reports three days of chest pain and new onset SOB today, denies NV and dizziness. Hx coronary artery dissection 5 months ago, on ASA, plavix, and lisinopril, ABCs intact, A/O x4.

## 2019-05-01 ENCOUNTER — TELEPHONE (OUTPATIENT)
Dept: CARDIOLOGY | Facility: CLINIC | Age: 59
End: 2019-05-01

## 2019-05-01 NOTE — TELEPHONE ENCOUNTER
Patient called stating that she had her FLP done at her primary doctor and they would like her to work on her diet and mentioned starting Crestor. Pt said she will definitely work on diet but was hesitant to starting Crestor without talking with Dr. Zuleta as she knows he has taken her off statins in the past. Pt wondering what Dr. Zuleta would recommend.     4/26/19 FLP in care everywhere  Chol- 235  Tri- 83  HDL- 75  LDL- 143    Chol HDL LDL demetriec Chol/HDL TG   04/05/19 0618 227 77 140 -- 51   10/13/18 1245 209 83 115 -- 54         3/7/19 OV Dr. Zuleta   Hx: non-ST elevation myocardial infarction on 10/13/2018 and likely SCAD. Coronary angiography demonstrated a narrowing in the first obtuse marginal branch artery with tapering in the mid vessel.  There was EMMIE grade 3 flow, however.  Echocardiogram showed severe mid and basal inferolateral and lateral wall hypokinesis with ejection fraction of 50%-55%.  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  The patient has not tolerated the beta blocker very well due to palpitations.  A Holter monitor did not demonstrate any significant arrhythmias.  The beta blocker was discontinued, and the patient has felt improved.       PHYSICAL EXAMINATION:  As listed below.      ASSESSMENT:   1.  Desire Moise is a pleasant, 58-year-old female hospitalized 10/13/2018 for chest discomfort and a non-ST elevation myocardial infarction.  Coronary angiography was consistent with coronary artery dissection in the first obtuse marginal branch artery.  We reviewed her previous testing to rule out fibromuscular dysplasia.  An MRI of her head and neck in 2015 was normal.  A previous CT scan of her chest and abdomen and pelvis did not show any vascular abnormalities, either.  She is currently stable.  She is free to do any activities she wishes.   2.  An echocardiogram was performed on 01/14 demonstrating the  continued lateral wall motion abnormality and the ejection fraction remaining at 50%-55%.  I suspect this will be a chronic finding for her.  I would continue with her aspirin and clopidogrel for 1 year and then discontinue the clopidogrel and leave her on aspirin.     10/26/18 tele enc   Given likely SCAD (increased risk of SCAD with statins), I would like this patient to discontinue the atorvastatin and see how she does.  Juan Carlos Zuleta MD, FACC  October 28, 2018 10:46 PM    Will route to Dr. Zuleta to review

## 2019-05-02 NOTE — TELEPHONE ENCOUNTER
Lipids reasonably normal.  No statin indicated.  Juan Carlos Zuleta MD, FACC  May 1, 2019 9:04 PM

## 2019-05-02 NOTE — TELEPHONE ENCOUNTER
Contacted patient to review Dr. Zuleta's recommendations. Patient did not answer. Left detailed message with Dr. Zuleta's recommendations. Instructed patient to call back with any further questions or concerns.

## 2019-05-03 ENCOUNTER — TELEPHONE (OUTPATIENT)
Dept: CARDIOLOGY | Facility: CLINIC | Age: 59
End: 2019-05-03

## 2019-05-03 NOTE — TELEPHONE ENCOUNTER
Notes recorded by Juan Carlos Zuleta MD on 5/3/2019 at 1:06 PM CDT  3 1/2 day Zio patch showing occasional PACs and PVCs without complex ectopy.  Continue to watch for tachycardia lasting longer than 10-20 minutes or irregular heart for 12 hours.  So far the ectopy is quite benign.  We can certainly try different beta blockers to reduce her palpitations when ever she wishes.  Juan Carlos Zuleta MD, [facility]  May 3, 2019 1:06 PM    Spoke to patient and informed her of her result above. Pt verbalized understanding and will watch for any tachycardia or irregular heart beats for extended amount of time. Pt stated that her palpitations are not to bothersome at this time and she will continue with her current medications. Pt has team 4 direct number to call with any questions or concerns.

## 2019-07-23 ENCOUNTER — TELEPHONE (OUTPATIENT)
Dept: CARDIOLOGY | Facility: CLINIC | Age: 59
End: 2019-07-23

## 2019-07-23 NOTE — TELEPHONE ENCOUNTER
Pt called stating that she was due to see Dr. Zuleta in September but she could not get in till 10/29/19. Pt stated that Dr. Zuleta stated she can discontinue her Plavix in September when he sees her so she is wondering if she should discontinue her Plavix in September or continue until she sees Dr. Zuleta 10/29/19.     Will route to Dr. Zuleta to review.     3/7/19 OV with Dr. Zuleta   ASSESSMENT:   1.  Desire Moise is a pleasant, 58-year-old female hospitalized 10/13/2018 for chest discomfort and a non-ST elevation myocardial infarction.  Coronary angiography was consistent with coronary artery dissection in the first obtuse marginal branch artery.  We reviewed her previous testing to rule out fibromuscular dysplasia.  An MRI of her head and neck in 2015 was normal.  A previous CT scan of her chest and abdomen and pelvis did not show any vascular abnormalities, either.  She is currently stable.  She is free to do any activities she wishes.   2.  An echocardiogram was performed on 01/14 demonstrating the continued lateral wall motion abnormality and the ejection fraction remaining at 50%-55%.  I suspect this will be a chronic finding for her.  I would continue with her aspirin and clopidogrel for 1 year and then discontinue the clopidogrel and leave her on aspirin.     It is my pleasure to assist in the care of Desire Moise.  I will see her again in 6 months or earlier on a p.r.n. basis.  Her  was in attendance today.  All their questions were answered to their satisfaction.

## 2019-07-26 NOTE — TELEPHONE ENCOUNTER
Contacted patient to review. Patient did not answer. Left detailed message informing patient of Dr. Zuleta's comments, instructing patient that she may stop her Plavix on 10/13/19, but continue the ASA 81 mg daily. Instructed patient to call with any further questions.

## 2019-07-26 NOTE — TELEPHONE ENCOUNTER
I would continue the clopidogrel and ASA until 10/13/2019.  I would then discontinue the clopidogrel and continue the ASA 81 mg daily.  Juan Carlos Zuleta MD, FACC  July 26, 2019 7:47 AM

## 2019-08-27 ENCOUNTER — TELEPHONE (OUTPATIENT)
Dept: CARDIOLOGY | Facility: CLINIC | Age: 59
End: 2019-08-27

## 2019-08-27 NOTE — TELEPHONE ENCOUNTER
IOANA from patient, stating that she needs to have a root canal and is wondering about holding her plavix. Contacted patient to review. Reviewed with patient that she will be at her one year of plavix in mid October and that it is preferred that the patient continue her plavix uninterrupted until that time. Patient verbalized understanding and agreed with plan of care. Patient states that she meets with the oral surgeon tomorrow and will discuss further with them and let us know if she has any further questions.    PROGRESS NOTE  Nephrology    Date of Admission:   3/1/2018    Date of Service: 3/2/2018   Attending: Hospitalist Service     Hospital Day:  1    Principal Problem:    Perforated viscus  Active Problems:    Chronic kidney disease (CKD), stage V (CMS/Formerly [facility])    HTN (hypertension), benign    Hyperparathyroidism due to renal insufficiency (CMS/Formerly [facility])    Bilateral hearing loss    Tobacco abuse       Reason for Visit:  Stage V chronic kidney disease on dialysis    Assessment and Plan:  1. Stage V chronic kidney disease on dialysis: Dialysis completed this morning without problem. Next dialysis on Monday.  2. Hyperkalemia: Repeat lab tomorrow.      Interval History:  Feels ok today. Minimal pain.    Past Medical/Surgical/Social histories reviewed as recorded in the admit History and Physical.  Medications and Allergies reviewed as recorded in EPIC.    Medications:   Scheduled Medications:  • metoPROLOL succinate  100 mg Oral Daily   • amLODIPine  10 mg Oral Daily   • prazosin  6 mg Oral Nightly   • heparin (porcine)  2,500 Units Intravenous Once   • sodium chloride (PF)  2 mL Injection 2 times per day   • sodium chloride (PF)  2 mL Injection 2 times per day   • micafungin (MYCAMINE) IVPB  150 mg Intravenous Daily   • enoxaparin (LOVENOX) injection  30 mg Subcutaneous Q24H   • sucralfate  1 g Per NG tube Q6H   • piperacillin-tazobactam (ZOSYN) extended interval IVPB  3.375 g Intravenous 2 times per day     PRN Medications:  morphine, HYDROmorphone, sodium citrate anticoagulant, sodium chloride, albumin human (SPA), sodium chloride (PF), sodium chloride (PF), naLOXone, ondansetron, DIAZepam, HYDROcodone-acetaminophen  Current IV Infusions:  • sodium chloride 0.9% infusion     • sodium chloride 0.9% infusion 100 mL/hr at 03/02/18 1258   • pantoprazole (PROTONIX) 80 mg in sodium chloride 0.9 % 100 mL infusion 8 mg/hr (03/02/18 1325)          Vital 24 Hour Range Most Recent Value   Temperature Temp  Min: 98.6 °F (37 °C)  Max: 100.4  °F (38 °C) 98.7 °F (37.1 °C)   Pulse Pulse  Min: 80  Max: 108 96   Respiratory Resp  Min: 16  Max: 24 24   Blood Pressure BP  Min: 156/76  Max: 221/96 (!) 209/99   Pulse Oximetry SpO2  Min: 90 %  Max: 97 %    O2 O2 Flow Rate (L/min)  Av.2 L/min  Min: 2 L/min   Min taken time: 18 1214  Max: 3 L/min   Max taken time: 18 1545      Vital Most Recent Value First Value   Weight 57.2 kg Weight: 52.8 kg   Height 5' 7\" (170.2 cm) Height: 5' 7\" (170.2 cm)     I/O last 3 completed shifts:  In: 2437 [I.V.:2347; IV Piggyback:90]  Out: 555 [Urine:355; Drains:200]     Physical Examination:  General: Awake, alert, oriented to person; in no distress   Chest:  Clear to auscultation, normal respiratory effort   Heart:  Regular rate. No rub, murmur, S3   Extremities: No edema       Lab  Recent Results (from the past 12 hour(s))   Magnesium Level    Collection Time: 18  6:15 AM   Result Value Ref Range    MAGNESIUM 2.0 1.7 - 2.4 mg/dL   CBC & Auto Differential    Collection Time: 18  6:15 AM   Result Value Ref Range    WBC 21.8 (H) 4.2 - 11.0 K/mcL    RBC 3.76 (L) 4.50 - 5.90 mil/mcL    HGB 11.9 (L) 13.0 - 17.0 g/dL    HCT 37.1 (L) 39.0 - 51.0 %    MCV 98.7 78.0 - 100.0 fl    MCH 31.6 26.0 - 34.0 pg    MCHC 32.1 32.0 - 36.5 g/dL    RDW-CV 17.1 (H) 11.0 - 15.0 %     140 - 450 K/mcL    DIFF TYPE AUTOMATED DIFFERENTIAL     Neutrophil 91 %    LYMPH 2 %    MONO 7 %    EOSIN 0 %    BASO 0 %    Absolute Neutrophil 19.8 (H) 1.8 - 7.7 K/mcL    Absolute Lymph 0.5 (L) 1.0 - 4.0 K/mcL    Absolute Mono 1.6 (H) 0.3 - 0.9 K/mcL    Absolute Eos 0.0 (L) 0.1 - 0.5 K/mcL    Absolute Baso 0.0 0.0 - 0.3 K/mcL   Basic Metabolic Panel    Collection Time: 18  6:15 AM   Result Value Ref Range    Sodium 136 135 - 145 mmol/L    Potassium 6.1 (H) 3.4 - 5.1 mmol/L    Chloride 105 98 - 107 mmol/L    Carbon Dioxide 19 (L) 21 - 32 mmol/L    Anion Gap 18 10 - 20 mmol/L    Glucose 142 (H) 65 - 99 mg/dL    BUN 32 (H) 6 - 20 mg/dL     Creatinine 4.85 (H) 0.67 - 1.17 mg/dL    GFR Estimate,  13     GFR Estimate, Non African American 11     BUN/Creatinine Ratio 7 7 - 25    CALCIUM 7.8 (L) 8.4 - 10.2 mg/dL       Roberto Dias MD  Nephrologist  [facility]  3/2/2018  1:40 PM

## 2019-10-29 ENCOUNTER — OFFICE VISIT (OUTPATIENT)
Dept: CARDIOLOGY | Facility: CLINIC | Age: 59
End: 2019-10-29
Payer: COMMERCIAL

## 2019-10-29 VITALS
DIASTOLIC BLOOD PRESSURE: 96 MMHG | BODY MASS INDEX: 23.87 KG/M2 | SYSTOLIC BLOOD PRESSURE: 164 MMHG | HEIGHT: 66 IN | HEART RATE: 72 BPM | WEIGHT: 148.5 LBS

## 2019-10-29 DIAGNOSIS — I25.42 SPONTANEOUS DISSECTION OF CORONARY ARTERY: ICD-10-CM

## 2019-10-29 DIAGNOSIS — R00.2 PALPITATIONS: ICD-10-CM

## 2019-10-29 DIAGNOSIS — I10 ESSENTIAL HYPERTENSION: ICD-10-CM

## 2019-10-29 DIAGNOSIS — I24.9 ACS (ACUTE CORONARY SYNDROME) (H): Primary | ICD-10-CM

## 2019-10-29 DIAGNOSIS — I21.4 NSTEMI (NON-ST ELEVATED MYOCARDIAL INFARCTION) (H): ICD-10-CM

## 2019-10-29 PROCEDURE — 99214 OFFICE O/P EST MOD 30 MIN: CPT | Performed by: INTERNAL MEDICINE

## 2019-10-29 ASSESSMENT — MIFFLIN-ST. JEOR: SCORE: 1270.34

## 2019-10-29 NOTE — LETTER
10/29/2019      Matilde Christopher  [facility] [address] [place] [address]      RE: Desiregeetah Bobbyryan       Dear Colleague,    I had the pleasure of seeing Desire Moise in the [facility].    Service Date: 10/29/2019      PRIMARY CARE PHYSICIAN:  Dr. Matilde Christopher.      HISTORY OF PRESENT ILLNESS:  I again had the pleasure of seeing your patient, Desire Moise, at [facility] in [place] for evaluation of a non-ST elevation myocardial infarction on 10/13/2018 likely due to SCAD.  Desire is a delightful 59-year-old female admitted 10/13/2018 for what sounded like unstable angina.  Troponin levels were elevated and EKG showed sinus rhythm with nonspecific ST-T wave changes.  Coronary angiography demonstrated a narrowing in the first obtuse marginal branch artery with tapering in the mid vessel.  There was EMMIE grade 3 flow, however.  Echocardiogram showed severe mid and basal inferolateral and lateral wall hypokinesis with ejection fraction of 50%-55%.  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  We could not be sure this did not represent coronary vasospasm.  The patient did not tolerate beta blockers very well due to palpitations.  A Holter monitor did not demonstrate any significant arrhythmias.  Recently she again had some palpitations lasting for several minutes at a time.  Her beta blocker was discontinued and the patient felt improved.  After completing cardiac rehabilitation she now exercises on a treadmill at 3.3-3.8 miles per hour for 35 minutes generally every other day.  During the summertime she has been walking outside.  She denies shortness of breath or chest discomfort.  A journal of her home blood pressures and pulse rates has for the most part been normal in the past.  She has remained on a low-salt diet.  She denies PND, orthopnea or peripheral edema.   Evaluation for fibromuscular dysplasia was negative.      The patient was hospitalized for palpitations and chest discomfort in April.  She ruled out for myocardial infarction and on 04/05/2019 a nuclear stress test was normal without evidence of ischemia or infarction.  Ejection fraction was 72%.  Her resting blood pressure 138/78 and peak blood pressure 150/70.  Her echocardiogram in January had demonstrated no significant change in left ventricular function with hypokinesis of the mid to distal segments of the lateral wall and ejection fraction 50%-55%.  Her blood pressure at that time was 132/85.      PHYSICAL EXAMINATION:   VITAL SIGNS:  Current blood pressure is initially 179/95 with a subsequent blood pressure of 164/96.  Pulse is 72 and regular.   CHEST:  Clear to auscultation.   CARDIAC:  Regular rate and rhythm with an S4 gallop but no S3 or murmur.  No JVD.  Pulses are all intact without bruits.   ABDOMEN:  Benign.   EXTREMITIES:  Without cyanosis, clubbing or edema.      I reviewed this patient's lipids from 04/05/2019 showing total cholesterol 227, HDL 77,  and triglycerides 51.      ASSESSMENT:   1.  Desire Moise is a pleasant 59-year-old female hospitalized 10/13/2018 for chest discomfort and a non-ST elevation myocardial infarction.  Coronary angiography was consistent with coronary artery dissection in the first obtuse marginal branch artery.  We reviewed her previous testing to rule out fibromuscular dysplasia.  An MRI of her head and neck in 2015 was normal.  A previous CT scan of her chest and abdomen and pelvis did not show any vascular abnormalities either.  She is currently stable.  I have asked her to discontinue her Plavix and continue with aspirin.   2.  The patient had a normal nuclear stress test in April.  Her ejection fraction was normal without regional wall motion abnormalities.   3.  Both systolic and diastolic hypertension today.  This is certainly out of the ordinary for  her.  The patient did mention that she has been under considerable stress due to family situations recently.   We are going to perform a 24-hour ambulatory blood pressure monitor.  In addition she is going to take her blood pressure at home intermittently over the next month and call us with an update on where her blood pressure is trending.  Based on this we can decide whether to increase her lisinopril dose or add other medications.        It is my pleasure to assist in the care of Miguel Moise.  I will see her again in 1 year or earlier on a p.r.n. basis.  Her  was in attendance today.  All their questions were answered to their satisfaction.      Mark Edouard MD        cc:   Matilde Christopher MD    [facility] [address] [place] [address]         MARK EDOUARD MD, [facility]             D: 10/29/2019   T: 10/29/2019   MT: DREW      Name:     MIGUEL MOISE   MRN:      [id number]        Account:      [id number]   :      1960           Service Date: 10/29/2019      Document: [id number]           Outpatient Encounter Medications as of 10/29/2019   Medication Sig Dispense Refill     aspirin 81 MG EC tablet Take 1 tablet (81 mg) by mouth daily 30 tablet 1     lisinopril (PRINIVIL/ZESTRIL) 10 MG tablet Take 1 tablet (10 mg) by mouth daily 90 tablet 3     [DISCONTINUED] clopidogrel (PLAVIX) 75 MG tablet Take 1 tablet (75 mg) by mouth daily 90 tablet 3     No facility-administered encounter medications on file as of 10/29/2019.        Again, thank you for allowing me to participate in the care of your patient.      Sincerely,    Mark Edouard MD     [facility]

## 2019-10-29 NOTE — PROGRESS NOTES
HPI and Plan:   See dictation:[id number]    Orders Placed This Encounter   Procedures     Follow-Up with Cardiologist     24 Hour Blood Pressure Monitor - Adult       No orders of the defined types were placed in this encounter.      Medications Discontinued During This Encounter   Medication Reason     clopidogrel (PLAVIX) 75 MG tablet          Encounter Diagnoses   Name Primary?     ACS (acute coronary syndrome) (H) Yes     NSTEMI (non-ST elevated myocardial infarction) (H)      Spontaneous dissection of coronary artery      Palpitations      Essential hypertension        CURRENT MEDICATIONS:  Current Outpatient Medications   Medication Sig Dispense Refill     aspirin 81 MG EC tablet Take 1 tablet (81 mg) by mouth daily 30 tablet 1     lisinopril (PRINIVIL/ZESTRIL) 10 MG tablet Take 1 tablet (10 mg) by mouth daily 90 tablet 3       ALLERGIES     Allergies   Allergen Reactions     Atorvastatin      Other reaction(s): Myalgias     Metoprolol Palpitations       PAST MEDICAL HISTORY:  Past Medical History:   Diagnosis Date     ACS (acute coronary syndrome) (H) 10/13/2018     Anemia 4/16/2007     Problem list name updated by automated process. Provider to review     CAD (coronary artery disease)     nonobstructive     Diverticulosis      Family history of other cardiovascular diseases 9/23/2002     Problem list name updated by automated process. Provider to review and confirm Problem list name updated by automated process. Provider to review     GERD (gastroesophageal reflux disease)      HTN (hypertension)      NSTEMI (non-ST elevated myocardial infarction) (H) 10/13/2018     Palpitations      Spina bifida occulta 10/23/2002     Spontaneous dissection of coronary artery        PAST SURGICAL HISTORY:  Past Surgical History:   Procedure Laterality Date     GYN SURGERY       HYSTERECTOMY       INCISION OF HYMEN  1980     OPEN REDUCTION INTERNAL FIXATION WRIST Right 9/11/2017    Procedure: OPEN REDUCTION INTERNAL FIXATION  WRIST;  Open reduction and internal fixation right intra-articular displaced distal radius fracture;  Surgeon: Santi Villarreal MD;  Location: [facility] OR     REMOVE HARDWARE UPPER EXTREMITY Right 2/2/2018    Procedure: REMOVE HARDWARE UPPER EXTREMITY;  Removal of deep implant, right distal radius;  Surgeon: Santi Villarreal MD;  Location: [facility] OR     [place] teeth surgery  1980       FAMILY HISTORY:  Family History   Problem Relation Age of Onset     Heart Disease Father         cab     Lipids Father      Cancer Maternal Grandmother         cancer  Breast       SOCIAL HISTORY:  Social History     Socioeconomic History     Marital status:      Spouse name: John     Number of children: 3     Years of education: 15     Highest education level: None   Occupational History     Occupation: Teacher     Employer:    Social Needs     Financial resource strain: None     Food insecurity:     Worry: None     Inability: None     Transportation needs:     Medical: None     Non-medical: None   Tobacco Use     Smoking status: Never Smoker     Smokeless tobacco: Never Used   Substance and Sexual Activity     Alcohol use: No     Drug use: No     Sexual activity: Yes     Partners: Male     Birth control/protection: Condom   Lifestyle     Physical activity:     Days per week: None     Minutes per session: None     Stress: None   Relationships     Social connections:     Talks on phone: None     Gets together: None     Attends Sikh service: None     Active member of club or organization: None     Attends meetings of clubs or organizations: None     Relationship status: None     Intimate partner violence:     Fear of current or ex partner: None     Emotionally abused: None     Physically abused: None     Forced sexual activity: None   Other Topics Concern      Service Not Asked     Blood Transfusions Not Asked     Caffeine Concern Not Asked     Occupational Exposure Not Asked     Hobby Hazards Not Asked     Sleep  "Concern Not Asked     Stress Concern Not Asked     Weight Concern Not Asked     Special Diet Not Asked     Back Care Not Asked     Exercise Yes     Bike Helmet Not Asked     Seat Belt Yes     Self-Exams Yes     Parent/sibling w/ CABG, MI or angioplasty before 65F 55M? Not Asked   Social History Narrative     None       Review of Systems:  Skin:  Positive for bruising     Eyes:  Positive for glasses    ENT:  Negative      Respiratory:  Negative       Cardiovascular:    palpitations;Positive for    Gastroenterology: Negative      Genitourinary:  Negative      Musculoskeletal:  Negative      Neurologic:  Negative      Psychiatric:  Negative      Heme/Lymph/Imm:  Positive for easy bruising    Endocrine:  Negative        Physical Exam:  Vitals: BP (!) 164/96 (BP Location: Right arm, Patient Position: Sitting, Cuff Size: Adult Large)   Pulse 72   Ht 1.676 m (5' 6\")   Wt 67.4 kg (148 lb 8 oz)   LMP 06/15/2003 (LMP Unknown)   Breastfeeding? No   BMI 23.97 kg/m      Constitutional:  cooperative, alert and oriented, well developed, well nourished, in no acute distress        Skin:  warm and dry to the touch, no apparent skin lesions or masses noted          Head:  normocephalic, no masses or lesions        Eyes:  pupils equal and round;conjunctivae and lids unremarkable;sclera white;no xanthalasma;EOMS intact        Lymph:      ENT:  no pallor or cyanosis, dentition good        Neck:  carotid pulses are full and equal bilaterally, JVP normal, no carotid bruit        Respiratory:  normal breath sounds, clear to auscultation, normal A-P diameter, normal symmetry, normal respiratory excursion, no use of accessory muscles         Cardiac: regular rhythm;normal S1 and S2;apical impulse not displaced   S4 no presence of murmur          pulses full and equal, no bruits auscultated                                        GI:  abdomen soft, non-tender, BS normoactive, no mass, no HSM, no bruits        Extremities and Muscular " Skeletal:  no deformities, clubbing, cyanosis, erythema observed;no edema              Neurological:  no gross motor deficits;affect appropriate        Psych:  Alert and Oriented x 3        CC  Juan Carlos Zuleta MD  [address]  [place] [id number]

## 2019-10-29 NOTE — LETTER
10/29/2019    Matilde Ashwin  [facility] [address] [place] [address]    RE: Desire Moise       Dear Colleague,    I had the pleasure of seeing Desire Moise in the [facility].    HPI and Plan:   See dictation:[id number]    Orders Placed This Encounter   Procedures     Follow-Up with Cardiologist     24 Hour Blood Pressure Monitor - Adult       No orders of the defined types were placed in this encounter.      Medications Discontinued During This Encounter   Medication Reason     clopidogrel (PLAVIX) 75 MG tablet          Encounter Diagnoses   Name Primary?     ACS (acute coronary syndrome) (H) Yes     NSTEMI (non-ST elevated myocardial infarction) (H)      Spontaneous dissection of coronary artery      Palpitations      Essential hypertension        CURRENT MEDICATIONS:  Current Outpatient Medications   Medication Sig Dispense Refill     aspirin 81 MG EC tablet Take 1 tablet (81 mg) by mouth daily 30 tablet 1     lisinopril (PRINIVIL/ZESTRIL) 10 MG tablet Take 1 tablet (10 mg) by mouth daily 90 tablet 3       ALLERGIES     Allergies   Allergen Reactions     Atorvastatin      Other reaction(s): Myalgias     Metoprolol Palpitations       PAST MEDICAL HISTORY:  Past Medical History:   Diagnosis Date     ACS (acute coronary syndrome) (H) 10/13/2018     Anemia 4/16/2007     Problem list name updated by automated process. Provider to review     CAD (coronary artery disease)     nonobstructive     Diverticulosis      Family history of other cardiovascular diseases 9/23/2002     Problem list name updated by automated process. Provider to review and confirm Problem list name updated by automated process. Provider to review     GERD (gastroesophageal reflux disease)      HTN (hypertension)      NSTEMI (non-ST elevated myocardial infarction) (H) 10/13/2018     Palpitations      Spina bifida occulta 10/23/2002     Spontaneous dissection of coronary artery        PAST  SURGICAL HISTORY:  Past Surgical History:   Procedure Laterality Date     GYN SURGERY       HYSTERECTOMY       INCISION OF HYMEN  1980     OPEN REDUCTION INTERNAL FIXATION WRIST Right 9/11/2017    Procedure: OPEN REDUCTION INTERNAL FIXATION WRIST;  Open reduction and internal fixation right intra-articular displaced distal radius fracture;  Surgeon: Santi Villarreal MD;  Location: [facility] OR     REMOVE HARDWARE UPPER EXTREMITY Right 2/2/2018    Procedure: REMOVE HARDWARE UPPER EXTREMITY;  Removal of deep implant, right distal radius;  Surgeon: Santi Villarreal MD;  Location: [facility] OR     [place] teeth surgery  1980       FAMILY HISTORY:  Family History   Problem Relation Age of Onset     Heart Disease Father         cab     Lipids Father      Cancer Maternal Grandmother         cancer  Breast       SOCIAL HISTORY:  Social History     Socioeconomic History     Marital status:      Spouse name: John     Number of children: 3     Years of education: 15     Highest education level: None   Occupational History     Occupation: Teacher     Employer:    Social Needs     Financial resource strain: None     Food insecurity:     Worry: None     Inability: None     Transportation needs:     Medical: None     Non-medical: None   Tobacco Use     Smoking status: Never Smoker     Smokeless tobacco: Never Used   Substance and Sexual Activity     Alcohol use: No     Drug use: No     Sexual activity: Yes     Partners: Male     Birth control/protection: Condom   Lifestyle     Physical activity:     Days per week: None     Minutes per session: None     Stress: None   Relationships     Social connections:     Talks on phone: None     Gets together: None     Attends Sabianist service: None     Active member of club or organization: None     Attends meetings of clubs or organizations: None     Relationship status: None     Intimate partner violence:     Fear of current or ex partner: None     Emotionally abused: None      "Physically abused: None     Forced sexual activity: None   Other Topics Concern      Service Not Asked     Blood Transfusions Not Asked     Caffeine Concern Not Asked     Occupational Exposure Not Asked     Hobby Hazards Not Asked     Sleep Concern Not Asked     Stress Concern Not Asked     Weight Concern Not Asked     Special Diet Not Asked     Back Care Not Asked     Exercise Yes     Bike Helmet Not Asked     Seat Belt Yes     Self-Exams Yes     Parent/sibling w/ CABG, MI or angioplasty before 65F 55M? Not Asked   Social History Narrative     None       Review of Systems:  Skin:  Positive for bruising     Eyes:  Positive for glasses    ENT:  Negative      Respiratory:  Negative       Cardiovascular:    palpitations;Positive for    Gastroenterology: Negative      Genitourinary:  Negative      Musculoskeletal:  Negative      Neurologic:  Negative      Psychiatric:  Negative      Heme/Lymph/Imm:  Positive for easy bruising    Endocrine:  Negative        Physical Exam:  Vitals: BP (!) 164/96 (BP Location: Right arm, Patient Position: Sitting, Cuff Size: Adult Large)   Pulse 72   Ht 1.676 m (5' 6\")   Wt 67.4 kg (148 lb 8 oz)   LMP 06/15/2003 (LMP Unknown)   Breastfeeding? No   BMI 23.97 kg/m       Constitutional:  cooperative, alert and oriented, well developed, well nourished, in no acute distress        Skin:  warm and dry to the touch, no apparent skin lesions or masses noted          Head:  normocephalic, no masses or lesions        Eyes:  pupils equal and round;conjunctivae and lids unremarkable;sclera white;no xanthalasma;EOMS intact        Lymph:      ENT:  no pallor or cyanosis, dentition good        Neck:  carotid pulses are full and equal bilaterally, JVP normal, no carotid bruit        Respiratory:  normal breath sounds, clear to auscultation, normal A-P diameter, normal symmetry, normal respiratory excursion, no use of accessory muscles         Cardiac: regular rhythm;normal S1 and S2;apical " impulse not displaced   S4 no presence of murmur          pulses full and equal, no bruits auscultated                                        GI:  abdomen soft, non-tender, BS normoactive, no mass, no HSM, no bruits        Extremities and Muscular Skeletal:  no deformities, clubbing, cyanosis, erythema observed;no edema              Neurological:  no gross motor deficits;affect appropriate        Psych:  Alert and Oriented x 3        CC  Juan Carlos Zuleta MD  [address]  [place] [id number]                Thank you for allowing me to participate in the care of your patient.      Sincerely,     Juan Carlos Zuleta MD     [facility]    cc:   Juan Carlos Zuleta MD  [address]  [place] [id number]

## 2019-10-29 NOTE — PROGRESS NOTES
Service Date: 10/29/2019      PRIMARY CARE PHYSICIAN:  Dr. Matilde Christopher.      HISTORY OF PRESENT ILLNESS:  I again had the pleasure of seeing your patient, Desire Moise, at [facility] in [place] for evaluation of a non-ST elevation myocardial infarction on 10/13/2018 likely due to SCAD.  Desire is a delightful 59-year-old female admitted 10/13/2018 for what sounded like unstable angina.  Troponin levels were elevated and EKG showed sinus rhythm with nonspecific ST-T wave changes.  Coronary angiography demonstrated a narrowing in the first obtuse marginal branch artery with tapering in the mid vessel.  There was EMMIE grade 3 flow, however.  Echocardiogram showed severe mid and basal inferolateral and lateral wall hypokinesis with ejection fraction of 50%-55%.  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  We could not be sure this did not represent coronary vasospasm.  The patient did not tolerate beta blockers very well due to palpitations.  A Holter monitor did not demonstrate any significant arrhythmias.  Recently she again had some palpitations lasting for several minutes at a time.  Her beta blocker was discontinued and the patient felt improved.  After completing cardiac rehabilitation she now exercises on a treadmill at 3.3-3.8 miles per hour for 35 minutes generally every other day.  During the summertime she has been walking outside.  She denies shortness of breath or chest discomfort.  A journal of her home blood pressures and pulse rates has for the most part been normal in the past.  She has remained on a low-salt diet.  She denies PND, orthopnea or peripheral edema.  Evaluation for fibromuscular dysplasia was negative.      The patient was hospitalized for palpitations and chest discomfort in April.  She ruled out for myocardial infarction and on 04/05/2019 a nuclear stress test was normal without evidence of  ischemia or infarction.  Ejection fraction was 72%.  Her resting blood pressure 138/78 and peak blood pressure 150/70.  Her echocardiogram in January had demonstrated no significant change in left ventricular function with hypokinesis of the mid to distal segments of the lateral wall and ejection fraction 50%-55%.  Her blood pressure at that time was 132/85.      PHYSICAL EXAMINATION:   VITAL SIGNS:  Current blood pressure is initially 179/95 with a subsequent blood pressure of 164/96.  Pulse is 72 and regular.   CHEST:  Clear to auscultation.   CARDIAC:  Regular rate and rhythm with an S4 gallop but no S3 or murmur.  No JVD.  Pulses are all intact without bruits.   ABDOMEN:  Benign.   EXTREMITIES:  Without cyanosis, clubbing or edema.      I reviewed this patient's lipids from 04/05/2019 showing total cholesterol 227, HDL 77,  and triglycerides 51.      ASSESSMENT:   1.  Desire Moise is a pleasant 59-year-old female hospitalized 10/13/2018 for chest discomfort and a non-ST elevation myocardial infarction.  Coronary angiography was consistent with coronary artery dissection in the first obtuse marginal branch artery.  We reviewed her previous testing to rule out fibromuscular dysplasia.  An MRI of her head and neck in 2015 was normal.  A previous CT scan of her chest and abdomen and pelvis did not show any vascular abnormalities either.  She is currently stable.  I have asked her to discontinue her Plavix and continue with aspirin.   2.  The patient had a normal nuclear stress test in April.  Her ejection fraction was normal without regional wall motion abnormalities.   3.  Both systolic and diastolic hypertension today.  This is certainly out of the ordinary for her.  The patient did mention that she has been under considerable stress due to family situations recently.   We are going to perform a 24-hour ambulatory blood pressure monitor.  In addition she is going to take her blood pressure at home  intermittently over the next month and call us with an update on where her blood pressure is trending.  Based on this we can decide whether to increase her lisinopril dose or add other medications.        It is my pleasure to assist in the care of Miguel Moise.  I will see her again in 1 year or earlier on a p.r.n. basis.  Her  was in attendance today.  All their questions were answered to their satisfaction.      Mark Edouard MD        cc:   Matilde Christopher MD    [facility] [address] [place] [address]         MARK EDOUARD MD, [facility]             D: 10/29/2019   T: 10/29/2019   MT: DREW      Name:     MIGUEL MOISE   MRN:      [id number]        Account:      [id number]   :      1960           Service Date: 10/29/2019      Document: [id number]

## 2019-12-09 ENCOUNTER — TELEPHONE (OUTPATIENT)
Dept: CARDIOLOGY | Facility: CLINIC | Age: 59
End: 2019-12-09

## 2019-12-09 NOTE — TELEPHONE ENCOUNTER
Patient called and advised RN that last week she had a pretty stressful week (family and work issues). Patient reports her BP was elevated at one point 150/96. However, patient reports this morning at 10am her BP was 124/87 with HR 69. RN confirmed patient is taking her lisinopril as rx. Patient advised RN that her BP tends to be the highest between 4-9pm (while working). Patient advised RN she did not wear her BP monitor as she was advised it would go off q 30 min and she suspected her BP would be elevated d/t pain from the BP cuff.     RN inquired if patient would be in agreement to check her BP between 4-9pm for two weeks and call our office to provide updates with those readings. Patient verbalized understanding and is in agreement with plan. Patient will update us in two weeks with her BP readings. Patient will call prior to that time with any further questions/concerns.         10/29/19 ANA MARIA Zuleta  ASSESSMENT:   1.  Desire Moise is a pleasant 59-year-old female hospitalized 10/13/2018 for chest discomfort and a non-ST elevation myocardial infarction.  Coronary angiography was consistent with coronary artery dissection in the first obtuse marginal branch artery.  We reviewed her previous testing to rule out fibromuscular dysplasia.  An MRI of her head and neck in 2015 was normal.  A previous CT scan of her chest and abdomen and pelvis did not show any vascular abnormalities either.  She is currently stable.  I have asked her to discontinue her Plavix and continue with aspirin.   2.  The patient had a normal nuclear stress test in April.  Her ejection fraction was normal without regional wall motion abnormalities.   3.  Both systolic and diastolic hypertension today.  This is certainly out of the ordinary for her.  The patient did mention that she has been under considerable stress due to family situations recently.   We are going to perform a 24-hour ambulatory blood pressure monitor.  In addition she is  going to take her blood pressure at home intermittently over the next month and call us with an update on where her blood pressure is trending.  Based on this we can decide whether to increase her lisinopril dose or add other medications.         It is my pleasure to assist in the care of Desire Moise.  I will see her again in 1 year or earlier on a p.r.n. basis.  Her  was in attendance today.  All their questions were answered to their satisfaction.      Juan Carlos Zuleta MD

## 2020-02-11 DIAGNOSIS — I21.4 NSTEMI (NON-ST ELEVATED MYOCARDIAL INFARCTION) (H): ICD-10-CM

## 2020-02-11 RX ORDER — LISINOPRIL 10 MG/1
10 TABLET ORAL DAILY
Qty: 90 TABLET | Refills: 3 | Status: SHIPPED | OUTPATIENT
Start: 2020-02-11 | End: 2021-01-04

## 2020-04-22 ENCOUNTER — TELEPHONE (OUTPATIENT)
Dept: CARDIOLOGY | Facility: CLINIC | Age: 60
End: 2020-04-22

## 2020-04-22 NOTE — TELEPHONE ENCOUNTER
RN drafted letter and emailed to patient per her request at email address :   [email or web address]    Patient will call if she needs letter resent in its original form.    Dr. Jeffers's response          Sure, that'd be fine.

## 2020-04-22 NOTE — TELEPHONE ENCOUNTER
RN called patient and reviewed with her Dr. Jeffers's recommendation. Patient verbalized understanding and advised RN that she does feel that jury duty would cause severe emotional stress for her on top of her current stressors.     RN will confirm with Dr. Jeffers ok to draft letter based off of severe emotional stress and risk for dissection.       Dr. Jeffers's response  You could tell the patient that we generally don't excuse patients from jury duty unless they have severe compromise of heart function that limits their endurance.     If she feels that jury duty would be severe emotional stress for her, then that might be acceptable to excuse her for increased risk of future coronary dissection.

## 2020-04-22 NOTE — TELEPHONE ENCOUNTER
Patient called and left  requesting letter to be excused from Jury Duty d/t her underlying cardiac conditions. RN returned patient's call and advised in her in  of Dr. Zuleta's medical leave and that this RN would be sending to one of his colleagues for review and determination of ok to draft letter. RN advised patient in VM we would call her with an update once received.       10/29/19 OV Dr. Zuleta  ASSESSMENT:   1.  Desire Moise is a pleasant 59-year-old female hospitalized 10/13/2018 for chest discomfort and a non-ST elevation myocardial infarction.  Coronary angiography was consistent with coronary artery dissection in the first obtuse marginal branch artery.  We reviewed her previous testing to rule out fibromuscular dysplasia.  An MRI of her head and neck in 2015 was normal.  A previous CT scan of her chest and abdomen and pelvis did not show any vascular abnormalities either.  She is currently stable.  I have asked her to discontinue her Plavix and continue with aspirin.   2.  The patient had a normal nuclear stress test in April.  Her ejection fraction was normal without regional wall motion abnormalities.   3.  Both systolic and diastolic hypertension today.  This is certainly out of the ordinary for her.  The patient did mention that she has been under considerable stress due to family situations recently.   We are going to perform a 24-hour ambulatory blood pressure monitor.  In addition she is going to take her blood pressure at home intermittently over the next month and call us with an update on where her blood pressure is trending.  Based on this we can decide whether to increase her lisinopril dose or add other medications.         It is my pleasure to assist in the care of Desire Moise.  I will see her again in 1 year or earlier on a p.r.n. basis.  Her  was in attendance today.  All their questions were answered to their satisfaction.      Juan Carlos Zuleta MD

## 2020-04-22 NOTE — LETTER
April 22, 2020    RE:  Desire Moise 1960  [address] [facility] [id number]          To whom it may concern:    This patient is under my direct care for her cardiology needs. It is my medical opinion that Jury Duty would cause severe emotional stress for this patient, that may put her at an increased risk of future coronary dissection. For this reason, I request that you excuse this patient from her Jury Duty responsibility.    Should you have any questions, please call [phone].    Sincerely,      Valente Jeffers MD  [facility]

## 2021-01-04 DIAGNOSIS — I21.4 NSTEMI (NON-ST ELEVATED MYOCARDIAL INFARCTION) (H): ICD-10-CM

## 2021-01-04 RX ORDER — LISINOPRIL 10 MG/1
10 TABLET ORAL DAILY
Qty: 90 TABLET | Refills: 3 | Status: SHIPPED | OUTPATIENT
Start: 2021-01-04

## 2021-01-04 NOTE — TELEPHONE ENCOUNTER
Insurance will not allow NPI for Dr. Zuleta, need new order from covering provider, left message on RN line, thanks!    Rika Reilly, PharmD    [facility]  Phone:  [phone]  Fax:  [phone]

## 2021-03-09 NOTE — ED PROVIDER NOTES
History     Chief Complaint:  Chest Pain and Palpitations     HPI   Desire Moise is a 58 year old female with a history of spontaneous dissection of coronary artery, NSTEMI who presents to the emergency department today for evaluation of chest pain and palpitations. Patient states for the past 3 days she has been having intermittent irregular heart beat. Today, she endorses that she started feeling an irregular heart beat that went into a rapid heart beat along with chest pain, which prompted her to come to the ED. The patient reports that she has not had palpitations since she has been off of the metoprolol. Additionally, after the patient started feeling a rapid heart rate, she endorses that she took her blood pressure and it was elevated at 172/104. She states that she doesn't currently have chest pain, however, she states a slight chest tightness. Patient denies current chest pain, or leg swelling.    Cardiac/PE/DVT Risk Factors:  History of hypertension - positive  History of hyperlipidemia - negative  History of diabetes - negative  History of smoking - negative  Family history of heart complications - positive  Personal history of PE/DVT - negative    Allergies:  Atorvastatin  Metoprolol      Medications:    Aspirin  Plavix  Lisinopril     Past Medical History:    Coronary artery disease  Diverticulitis  GERD  Hypertension  NSTEMI  Spina bifida occulta  Spontaneous dissection of coronary artery    Past Surgical History:    GYN surgery  Hysterectomy  Incision of hymen  Open reduction internal fixation right wrist  Remove hardware upper extremity  [place] teeth extraction    Family History:    Father: heart disease, lipids    Social History:  The patient was accompanied to the ED by .  Smoking Status: Never Smoker  Smokeless Tobacco: Never Used  Alcohol Use: Negative  Drug Use: Negative  PCP: Matilde Christopher  Marital Status:        Review of Systems   Respiratory: Positive for chest  Airway    Date/Time: 3/9/2021 7:40 AM  Performed by: Jones Orourke M.D.  Authorized by: Jones Orourke M.D.     Location:  OR  Urgency:  Elective  Indications for Airway Management:  Anesthesia      Spontaneous Ventilation: absent    Sedation Level:  Deep  Preoxygenated: Yes    Patient Position:  Sniffing  Final Airway Type:  Endotracheal airway  Final Endotracheal Airway:  ETT  Cuffed: Yes    Technique Used for Successful ETT Placement:  Direct laryngoscopy    Insertion Site:  Oral  Blade Type:  Logan  Laryngoscope Blade/Videolaryngoscope Blade Size:  2  ETT Size (mm):  7.5  Measured from:  Teeth  ETT to Teeth (cm):  22  Placement Verified by: auscultation and capnometry    Cormack-Lehane Classification:  Grade I - full view of glottis  Number of Attempts at Approach:  1           "tightness.    Cardiovascular: Positive for palpitations. Negative for chest pain and leg swelling.   All other systems reviewed and are negative.        Physical Exam     Patient Vitals for the past 24 hrs:   BP Temp Temp src Pulse Heart Rate Resp SpO2 Height Weight   04/05/19 0056 -- -- -- -- 70 11 97 % -- --   04/05/19 0055 -- -- -- -- 70 12 96 % -- --   04/05/19 0054 (!) 170/101 -- -- 65 66 -- -- -- --   04/05/19 0015 -- -- -- 67 69 17 -- -- --   04/04/19 2359 -- -- -- -- 69 12 96 % -- --   04/04/19 2248 -- -- -- -- 69 16 96 % -- --   04/04/19 2247 -- -- -- -- 71 17 97 % -- --   04/04/19 2246 -- -- -- -- 67 10 97 % -- --   04/04/19 2245 (!) 165/111 -- -- 72 64 10 97 % -- --   04/04/19 2244 -- -- -- -- 64 15 97 % -- --   04/04/19 2233 -- -- -- 65 61 11 98 % -- --   04/04/19 2232 -- -- -- -- 65 11 94 % -- --   04/04/19 2229 (!) 168/104 -- -- 69 70 14 -- -- --   04/04/19 2134 (!) 179/110 98.2  F (36.8  C) Oral 73 73 20 100 % 1.676 m (5' 6\") 67.1 kg (148 lb)       Physical Exam  Constitutional:  Oriented to person, place, and time.   HENT:   Head:    Normocephalic.   Mouth/Throat:   Oropharynx is clear and moist.   Eyes:    EOM are normal. Pupils are equal, round, and reactive to light.   Neck:    Neck supple.   Cardiovascular:  Normal rate, regular rhythm and normal heart sounds.      Exam reveals no gallop and no friction rub.       No murmur heard.  Pulmonary/Chest:  Effort normal and breath sounds normal.      No respiratory distress. No wheezes. No rales.      No reproducible chest wall pain.  Abdominal:   Soft. No distension. No tenderness. No rebound and no guarding.   Musculoskeletal:  2+ distal equal pulses. No leg calf tenderness, swelling or edema.  Neurological:   Alert and oriented to person, place, and time.           Moves all 4 extremities spontaneously    Skin:    No rash noted. No pallor.     Emergency Department Course     ECG:  ECG taken at 2023, ECG read at 2023  Sinus tachycardia  Voltage " criteria for left ventricular hypertrophy  Abnormal ECG  Rate 128 bpm. NV interval 136 ms. QRS duration 84 ms. QT/QTc 298/435 ms. P-R-T axes 55 49 21.    Imaging:  Radiology findings were communicated with the patient who voiced understanding of the findings.    XR Chest 2 Views  No infiltrates or other acute findings. Heart size is  within normal limits.  KEEGAN COLLINS MD  Reading per radiology    Laboratory:  Laboratory findings were communicated with the patient who voiced understanding of the findings.    Troponin (Collected 2149): <0.015  BNP: 432  CBC: WBC 6.4, HGB 13.0,   BMP: o/w WNL (Creatinine 0.83)    Interventions:  2247 aspirin 324 mg PO    Emergency Department Course:    2149 IV was inserted and blood was drawn for laboratory testing, results above.    2229 Nursing notes and vitals reviewed.    2233 I performed an exam of the patient as documented above.     2253 The patient was sent for a chest XR while in the emergency department, results above.     0002  I spoke with Dr. Benjamin of the hospitalist service regarding patient's presentation, findings, and plan of care.    0012 I spoke with Dr. Corea of the Cardiology service regarding patient's presentation, findings, and plan of care.    0019 I spoke with Dr. Benjamin of the hospitalist service regarding patient's presentation, findings, and plan of care.    I personally reviewed the lab and image results with the patient and answered all related questions prior to admission.    Impression & Plan      Medical Decision Making:  Desire Moise is a 58 year old female with history of spontaneous coronary artery dissection, NSTEMI who presents to the emergency department today for evaluation of atypical chest pain and palpitations since earlier this evening. Initially in triage she was sinus tachycardia rate in the 120's. This has currently resolved, now currently heart rate of 68. Her EKG shows no signs of acute ischemic changes. Differential  includes, ACS equivalent, anxiety, pulmonary embolism, or other causes. Workup thus far is otherwise nonspecific and labs are reassuring. She is currently chest pain free, not tachycardic, not hypoxic. I highly doubt pulmonary embolism and any need for workup for this. With her previous dissection and NSTEMI I do believe that she would need a further rule out, therefor, patient will be admitted to observation.    Diagnosis:    ICD-10-CM    1. Acute chest pain R07.9    2. Palpitations R00.2      Disposition:   The patient is admitted into the care of Dr. Benjamin.    Scribe Disclosure:  I, Hoda Obrien, am serving as a scribe at 10:32 PM on 4/4/2019 to document services personally performed by Rajiv Salas MD based on my observations and the provider's statements to me.    [facility] EMERGENCY DEPARTMENT       Rajiv Salas MD  04/05/19 0221

## 2021-04-01 ENCOUNTER — HOSPITAL ENCOUNTER (EMERGENCY)
Facility: CLINIC | Age: 61
Discharge: HOME | End: 2021-04-01
Attending: EMERGENCY MEDICINE | Admitting: EMERGENCY MEDICINE
Payer: COMMERCIAL

## 2021-04-01 ENCOUNTER — APPOINTMENT (OUTPATIENT)
Dept: GENERAL RADIOLOGY | Facility: CLINIC | Age: 61
End: 2021-04-01
Attending: EMERGENCY MEDICINE
Payer: COMMERCIAL

## 2021-04-01 ENCOUNTER — TELEPHONE (OUTPATIENT)
Dept: CARDIOLOGY | Facility: CLINIC | Age: 61
End: 2021-04-01

## 2021-04-01 VITALS
SYSTOLIC BLOOD PRESSURE: 154 MMHG | BODY MASS INDEX: 24.11 KG/M2 | OXYGEN SATURATION: 97 % | DIASTOLIC BLOOD PRESSURE: 89 MMHG | HEIGHT: 66 IN | WEIGHT: 150 LBS | RESPIRATION RATE: 8 BRPM | HEART RATE: 65 BPM | TEMPERATURE: 98.2 F

## 2021-04-01 DIAGNOSIS — R00.2 PALPITATIONS: ICD-10-CM

## 2021-04-01 LAB
ANION GAP SERPL CALCULATED.3IONS-SCNC: 5 MMOL/L (ref 3–14)
BASOPHILS # BLD AUTO: 0.1 10E9/L (ref 0–0.2)
BASOPHILS NFR BLD AUTO: 0.8 %
BUN SERPL-MCNC: 10 MG/DL (ref 7–30)
CALCIUM SERPL-MCNC: 9.3 MG/DL (ref 8.5–10.1)
CHLORIDE SERPL-SCNC: 108 MMOL/L (ref 94–109)
CO2 SERPL-SCNC: 28 MMOL/L (ref 20–32)
CREAT SERPL-MCNC: 0.88 MG/DL (ref 0.52–1.04)
DIFFERENTIAL METHOD BLD: ABNORMAL
EOSINOPHIL # BLD AUTO: 0.2 10E9/L (ref 0–0.7)
EOSINOPHIL NFR BLD AUTO: 2.6 %
ERYTHROCYTE [DISTWIDTH] IN BLOOD BY AUTOMATED COUNT: 12.9 % (ref 10–15)
GFR SERPL CREATININE-BSD FRML MDRD: 72 ML/MIN/{1.73_M2}
GLUCOSE SERPL-MCNC: 93 MG/DL (ref 70–99)
HCT VFR BLD AUTO: 43.5 % (ref 35–47)
HGB BLD-MCNC: 13.6 G/DL (ref 11.7–15.7)
IMM GRANULOCYTES # BLD: 0 10E9/L (ref 0–0.4)
IMM GRANULOCYTES NFR BLD: 0.2 %
INTERPRETATION ECG - MUSE: NORMAL
LYMPHOCYTES # BLD AUTO: 1.3 10E9/L (ref 0.8–5.3)
LYMPHOCYTES NFR BLD AUTO: 21.8 %
MCH RBC QN AUTO: 28.6 PG (ref 26.5–33)
MCHC RBC AUTO-ENTMCNC: 31.3 G/DL (ref 31.5–36.5)
MCV RBC AUTO: 91 FL (ref 78–100)
MONOCYTES # BLD AUTO: 0.5 10E9/L (ref 0–1.3)
MONOCYTES NFR BLD AUTO: 8.5 %
NEUTROPHILS # BLD AUTO: 4.1 10E9/L (ref 1.6–8.3)
NEUTROPHILS NFR BLD AUTO: 66.1 %
NRBC # BLD AUTO: 0 10*3/UL
NRBC BLD AUTO-RTO: 0 /100
PLATELET # BLD AUTO: 241 10E9/L (ref 150–450)
POTASSIUM SERPL-SCNC: 3.2 MMOL/L (ref 3.4–5.3)
RBC # BLD AUTO: 4.76 10E12/L (ref 3.8–5.2)
SODIUM SERPL-SCNC: 141 MMOL/L (ref 133–144)
TROPONIN I SERPL-MCNC: <0.015 UG/L (ref 0–0.04)
WBC # BLD AUTO: 6.1 10E9/L (ref 4–11)

## 2021-04-01 PROCEDURE — 85025 COMPLETE CBC W/AUTO DIFF WBC: CPT | Performed by: EMERGENCY MEDICINE

## 2021-04-01 PROCEDURE — 71045 X-RAY EXAM CHEST 1 VIEW: CPT

## 2021-04-01 PROCEDURE — 93005 ELECTROCARDIOGRAM TRACING: CPT

## 2021-04-01 PROCEDURE — 84484 ASSAY OF TROPONIN QUANT: CPT | Performed by: EMERGENCY MEDICINE

## 2021-04-01 PROCEDURE — 80048 BASIC METABOLIC PNL TOTAL CA: CPT | Performed by: EMERGENCY MEDICINE

## 2021-04-01 PROCEDURE — 99285 EMERGENCY DEPT VISIT HI MDM: CPT | Mod: 25

## 2021-04-01 ASSESSMENT — ENCOUNTER SYMPTOMS
SHORTNESS OF BREATH: 0
SLEEP DISTURBANCE: 1
FEVER: 0
COUGH: 0
PALPITATIONS: 1

## 2021-04-01 ASSESSMENT — MIFFLIN-ST. JEOR: SCORE: 1272.15

## 2021-04-01 NOTE — ED PROVIDER NOTES
History   Chief Complaint:  Palpitations    The history is provided by the patient.      Desire Moise is a 60 year old female with a history of GERD, hypertension, ACS, NSTEMI and spontaneous coronary artery dissection who presents for evaluation of palpitations. The patient reports having 3 days of heart palpitations and feeling that her heart is racing and irregular. She has not taken her pulse or blood pressure since her symptoms started. She reports difficulty sleeping since. She presents today because she started to feel pressure in her throat that has been constant since this morning. She denies chest pain or shortness of breath. She denies recent sickness (fever, cough, congestion) or known sick exposures. She states that she had similar symptoms 3 years ago when she had spontaneous coronary artery dissection. She called her cardiology clinic this morning and spoke with Kilo Olmos RN who referred her to the ED. She was being followed by Dr. Zuleta of cardiology but he recently passed away in November 2020. She plans to continue care at [facility].     04/04/2019 NM Myocardial Perfusion Scintigraphy Exercise   1.  Myocardial perfusion imaging using single isotope technique  demonstrated normal perfusion.   2. Gated images demonstrated normal wall motion.  The left ventricular  systolic function is calculated at 72%.  3. There is no prior nuclear study available for comparison.    10/13/2018 Coronary Angiogram  -Both coronary arteries arise from their respective cusps.  -Dominance: Right  -LM is a large caliber vessel which bifurcates into an LAD and LCx.  There is no angiographic evidence of disease.  -LAD is a large-caliber vessel which supplies the entire apex (type  3). The LAD gives rise to septal perforators, small-caliber D1,  small-caliber D2, and small-caliber D3. The remainder of the LAD has  minimal luminal irregularities.  -LCX is a large-caliber vessel and gives rise to  small-caliber OM1  before continuing as a small AV-groove LCx. The OM1 tapers in size  rapidly in the midsegment suggesting the possibility of coronary  artery dissection. There is EMMIE III flow. There is no atherosclerotic  disease.  -RCA is a large-caliber vessel and gives rise to PL branches and  supplies the PDA. There is no angiographic evidence of disease.    Review of Systems   Constitutional: Negative for fever.   HENT: Negative for congestion.         Pressure in throat   Respiratory: Negative for cough and shortness of breath.    Cardiovascular: Positive for palpitations. Negative for chest pain.   Psychiatric/Behavioral: Positive for sleep disturbance.   All other systems reviewed and are negative.    Allergies:  Atorvastatin  Metoprolol  Ibuprofen    Medications:  Aspirin 81 mg   Plavix  Lisinopril  Nitrostat    Past Medical History:    Acute coronary syndrome  Spontaneous dissection of coronary artery   NSTEMI  Anemia  CAD  Diverticulosis  GERD  Hypertension  Palpitations  Spina bifida occulta  DJD   Menopause  Renal cyst  Ovarian cyst    Past Surgical History:    Hysterectomy   Breast cystectomy   Pelvic laparoscopy   Salpingo-oophorectomy   D&C  Breast biopsy, benign   [place] teeth extraction   ORIF, right wrist     Family History:    Heart disease - father (CABG, PTCA/stent)  Hypertension - father, mother, sister  Hyperlipidemia - father   Thyroid disorder - daughter  Breast cancer - MGM, maternal aunt    Social History:  Presents to the ED: with her , John  Tobacco use: Never mokser  Alcohol use: No   Drug use: No   Marital Status:       Physical Exam     Patient Vitals for the past 24 hrs:   BP Temp Temp src Pulse Resp SpO2 Height Weight   04/01/21 1300 -- -- -- 65 8 -- -- --   04/01/21 1245 (!) 154/89 -- -- 70 10 97 % -- --   04/01/21 1230 (!) 161/95 -- -- 70 13 97 % -- --   04/01/21 1215 (!) 167/95 -- -- 66 (!) 49 96 % -- --   04/01/21 1200 -- -- -- 71 -- 98 % -- --   04/01/21 1145  "(!) 171/102 -- -- 69 16 99 % -- --   04/01/21 1130 (!) 180/163 -- -- 67 -- 96 % -- --   04/01/21 1115 (!) 172/102 -- -- 63 19 100 % -- --   04/01/21 1111 (!) 192/111 98.2  F (36.8  C) Oral 79 20 100 % 1.676 m (5' 6\") 68 kg (150 lb)   04/01/21 1100 (!) 192/111 -- -- -- -- -- -- --       Physical Exam  General: Patient is alert and cooperative.  HENT:  Normal appearance.   Eyes: EOMI. Normal conjunctiva.  Neck:  Normal range of motion and appearance.   Cardiovascular:  Normal rate, regular rhythm and normal heart sounds.   Pulmonary/Chest:  Effort normal. No wheezing or crackles.  Abdominal: Soft. No distension or tenderness.     Musculoskeletal: Normal range of motion. No edema or tenderness.   Neurological: oriented, normal strength, sensation, and coordination.   Skin: Warm and dry. No rash or bruising.   Psychiatric: Normal mood and affect. Normal behavior and judgement.      Emergency Department Course     ECG:  ECG taken at 1106, ECG read at 1130  Normal sinus rhythm  Normal ECG  When compared with ECG of 05-APR-2019, ST no longer depressed in Inferior leads  Rate 72 bpm. MI interval 122 ms. QRS duration 82 ms. QT/QTc 406/444 ms. P-R-T axes 56 64 46.    Imaging:  XR Chest Port 1 View  Negative chest.     Reading per radiology.    Laboratory:  CBC: WBC: 6.1, HGB: 13.6, PLT: 241    BMP: Glucose 93, Potassium: 3.2 (L), o/w WNL (Creatinine: 0.88)    Troponin (Collected 1116): <0.015    Emergency Department Course:    Reviewed:  I reviewed the patient's nursing notes, vitals and past medical history.     Assessments:  1100 I performed an exam of the patient in room ED09 as documented above.  1243 I updated the patient on results and discussed plan of care.    Disposition:  The patient was discharged to home.     Impression & Plan     CMS Diagnoses: None    Medical Decision Making:  Desire Moise is a 60 year old female who presents to the emergency department today for evaluation of a couple day history of " palpitations with some associated mild pressure in her throat.  She has had no real sense of irregular heart rate, dyspnea, or other symptoms.  She has a normal physical exam.  Emergency department testing has been reassuring and within normal limits.  This includes an EKG depicting a sinus rhythm with no ischemic changes or ectopy.  She has a normal chest x-ray and laboratory tests.  Her troponin is less than 0.015.  Medical history is notable for an MI related to a small spontaneous coronary artery dissection.  However most recent cardiac testing has included a perfusion study which was within normal limits as shown above.  Suspicion for a true cardiac dysrhythmia is very low.  There is no concern for an ischemic process and she is safe for discharge with follow-up with her clinic.      Diagnosis:    ICD-10-CM    1. Palpitations  R00.2        Scribe Disclosure:  I, Rika Lucia, am serving as a scribe at 11:00 AM on 4/1/2021 to document services personally performed by Kolton Jarvis MD based on my observations and the provider's statements to me.    This note was completed in part using Dragon voice recognition software. Although reviewed after completion, some word and grammatical errors may occur.      Kolton Jarvis MD  04/02/21 0495

## 2021-04-01 NOTE — ED TRIAGE NOTES
Pt c/o 2-3 days of rapid heart rate. Today pt reports pressure in throat. Hx of MI, aortic dissection.

## 2021-04-01 NOTE — TELEPHONE ENCOUNTER
"Patient called this morning to advise this RN that her HR has been elevated recently which causes her to have trouble sleeping at night. Patient reports that she woke up this morning \"not feeling well and similar to what I did when I had my last heart attack.\" Patient reports to RN her BP is elevated and she is no having throat pain/pressure. RN advised patient that with her hx and current symptoms she needs to be evaluated in the ED. Patient is in agreement with plan and confirmed for RN that she is able to have her  drive her. Patient will go to [facility] ED. RN called report to [facility] ED.     10/29/19 Dr. Zuleta  ASSESSMENT:   1.  Desire Moise is a pleasant 59-year-old female hospitalized 10/13/2018 for chest discomfort and a non-ST elevation myocardial infarction.  Coronary angiography was consistent with coronary artery dissection in the first obtuse marginal branch artery.  We reviewed her previous testing to rule out fibromuscular dysplasia.  An MRI of her head and neck in 2015 was normal.  A previous CT scan of her chest and abdomen and pelvis did not show any vascular abnormalities either.  She is currently stable.  I have asked her to discontinue her Plavix and continue with aspirin.   2.  The patient had a normal nuclear stress test in April.  Her ejection fraction was normal without regional wall motion abnormalities.   3.  Both systolic and diastolic hypertension today.  This is certainly out of the ordinary for her.  The patient did mention that she has been under considerable stress due to family situations recently.   We are going to perform a 24-hour ambulatory blood pressure monitor.  In addition she is going to take her blood pressure at home intermittently over the next month and call us with an update on where her blood pressure is trending.  Based on this we can decide whether to increase her lisinopril dose or add other medications.         It is my pleasure to assist in the care of Desire " Suma.  I will see her again in 1 year or earlier on a p.r.n. basis.  Her  was in attendance today.  All their questions were answered to their satisfaction.      Juan Carlos Zuleta MD

## 2021-04-08 ENCOUNTER — TELEPHONE (OUTPATIENT)
Dept: CARDIOLOGY | Facility: CLINIC | Age: 61
End: 2021-04-08

## 2021-04-08 NOTE — TELEPHONE ENCOUNTER
Patient called to follow up with RN that her rapid HR has resolved spontaneously. Patient also was inquiring if she should proceed with a Covid Vaccine as she has a known reaction (rapid HR) to prednisone. Patient reports that when asked that question when signing up for the vaccine, she answered yes and it would not allow her to proceed to sign up. Patient advised RN she also has a call out to her PMD and Covid RN hotline. RN advised patient that if she has reaction to prednisone and that was the question she should answer it truthfully. Patient was in agreement with plan and will follow up with her PMD and Covid hotline RN for further guidance. Patient has no further questions/concerns at this time.

## 2021-05-26 ENCOUNTER — OFFICE VISIT (OUTPATIENT)
Dept: CARDIOLOGY | Facility: CLINIC | Age: 61
End: 2021-05-26
Payer: COMMERCIAL

## 2021-05-26 VITALS
DIASTOLIC BLOOD PRESSURE: 90 MMHG | OXYGEN SATURATION: 98 % | HEART RATE: 63 BPM | SYSTOLIC BLOOD PRESSURE: 148 MMHG | HEIGHT: 66 IN | BODY MASS INDEX: 24.7 KG/M2 | WEIGHT: 153.7 LBS

## 2021-05-26 DIAGNOSIS — I10 ESSENTIAL HYPERTENSION: Primary | ICD-10-CM

## 2021-05-26 DIAGNOSIS — I25.42 SPONTANEOUS DISSECTION OF CORONARY ARTERY: ICD-10-CM

## 2021-05-26 PROCEDURE — 99214 OFFICE O/P EST MOD 30 MIN: CPT | Performed by: INTERNAL MEDICINE

## 2021-05-26 RX ORDER — HYDROCHLOROTHIAZIDE 12.5 MG/1
12.5 CAPSULE ORAL DAILY
Qty: 60 CAPSULE | Refills: 11 | Status: SHIPPED | OUTPATIENT
Start: 2021-05-26

## 2021-05-26 ASSESSMENT — MIFFLIN-ST. JEOR: SCORE: 1283.93

## 2021-05-26 NOTE — PROGRESS NOTES
"HISTORY OF PRESENT ILLNESS:  Recently seen in ED for ear pain. Measures blood pressure at home normally 140 to 150/ 90s. Family history of hypertension. Muscle aches. Dad CAB in 50s. ACC risk 5.8%    Orders this Visit:  No orders of the defined types were placed in this encounter.    No orders of the defined types were placed in this encounter.    There are no discontinued medications.    No diagnosis found.    CURRENT MEDICATIONS:  Current Outpatient Medications   Medication Sig Dispense Refill     aspirin 81 MG EC tablet Take 1 tablet (81 mg) by mouth daily 30 tablet 1     lisinopril (ZESTRIL) 10 MG tablet Take 1 tablet (10 mg) by mouth daily (Patient taking differently: Take 20 mg by mouth daily ) 90 tablet 3       ALLERGIES     Allergies   Allergen Reactions     Atorvastatin      Other reaction(s): Myalgias     Prednisone      Rapid HR per patient report     Metoprolol Palpitations       PAST MEDICAL, SURGICAL, FAMILY, SOCIAL HISTORY:  History was reviewed and updated as needed, see medical record.    Review of Systems:  A 12-point review of systems was completed, see medical record for detailed review of systems information.    Physical Exam:  Vitals: BP (!) 180/110 (BP Location: Right arm, Patient Position: Sitting, Cuff Size: Adult Regular)   Pulse 63   Ht 1.676 m (5' 6\")   Wt 69.7 kg (153 lb 11.2 oz)   LMP 06/15/2003 (LMP Unknown)   SpO2 98%   Breastfeeding No   BMI 24.81 kg/m      Constitutional:           Skin:           Head:           Eyes:           ENT:           Neck:           Chest:           Cardiac:                    Abdomen:           Vascular:                                        Extremities and Back:           Neurological:           ASSESSMENT: BP still suboptimal     RECOMMENDATIONS:   Lifestyle intervention  Add hydrochlorothiazide 12.5 daily  Follow-up in 3 weeks      Recent Lab Results:  LIPID RESULTS:  Lab Results   Component Value Date    CHOL 227 (H) 04/05/2019    HDL 77 " 04/05/2019     (H) 04/05/2019    TRIG 51 04/05/2019    CHOLHDLRATIO 3.0 04/16/2007       LIVER ENZYME RESULTS:  Lab Results   Component Value Date    AST 13 03/14/2016    ALT 16 03/14/2016       CBC RESULTS:  Lab Results   Component Value Date    WBC 6.1 04/01/2021    RBC 4.76 04/01/2021    HGB 13.6 04/01/2021    HCT 43.5 04/01/2021    MCV 91 04/01/2021    MCH 28.6 04/01/2021    MCHC 31.3 (L) 04/01/2021    RDW 12.9 04/01/2021     04/01/2021       BMP RESULTS:  Lab Results   Component Value Date     04/01/2021    POTASSIUM 3.2 (L) 04/01/2021    CHLORIDE 108 04/01/2021    CO2 28 04/01/2021    ANIONGAP 5 04/01/2021    GLC 93 04/01/2021    BUN 10 04/01/2021    CR 0.88 04/01/2021    GFRESTIMATED 72 04/01/2021    GFRESTBLACK 83 04/01/2021    BENI 9.3 04/01/2021        A1C RESULTS:  No results found for: A1C    INR RESULTS:  Lab Results   Component Value Date    INR 0.93 01/09/2019    INR 1.01 04/04/2015       We greatly appreciate the opportunity to be involved in the care of your patient, Desire Moise.    Sincerely,  Marlon Toribio MD      CC  No referring provider defined for this encounter.

## 2021-05-26 NOTE — LETTER
"5/26/2021    Matilde Christopher  [facility] [address]   [place] [address]    RE: Desire Moise       Dear Colleague,    I had the pleasure of seeing Desire Moise in the [facility].    HISTORY OF PRESENT ILLNESS:  Recently seen in ED for ear pain. Measures blood pressure at home normally 140 to 150/ 90s. Family history of hypertension. Muscle aches. Dad CAB in 50s. ACC risk 5.8%    Orders this Visit:  No orders of the defined types were placed in this encounter.    No orders of the defined types were placed in this encounter.    There are no discontinued medications.    No diagnosis found.    CURRENT MEDICATIONS:  Current Outpatient Medications   Medication Sig Dispense Refill     aspirin 81 MG EC tablet Take 1 tablet (81 mg) by mouth daily 30 tablet 1     lisinopril (ZESTRIL) 10 MG tablet Take 1 tablet (10 mg) by mouth daily (Patient taking differently: Take 20 mg by mouth daily ) 90 tablet 3       ALLERGIES     Allergies   Allergen Reactions     Atorvastatin      Other reaction(s): Myalgias     Prednisone      Rapid HR per patient report     Metoprolol Palpitations       PAST MEDICAL, SURGICAL, FAMILY, SOCIAL HISTORY:  History was reviewed and updated as needed, see medical record.    Review of Systems:  A 12-point review of systems was completed, see medical record for detailed review of systems information.    Physical Exam:  Vitals: BP (!) 180/110 (BP Location: Right arm, Patient Position: Sitting, Cuff Size: Adult Regular)   Pulse 63   Ht 1.676 m (5' 6\")   Wt 69.7 kg (153 lb 11.2 oz)   LMP 06/15/2003 (LMP Unknown)   SpO2 98%   Breastfeeding No   BMI 24.81 kg/m      Constitutional:           Skin:           Head:           Eyes:           ENT:           Neck:           Chest:           Cardiac:                    Abdomen:           Vascular:                                        Extremities and Back:           Neurological:  "          ASSESSMENT: BP still suboptimal     RECOMMENDATIONS:   Lifestyle intervention  Add hydrochlorothiazide 12.5 daily  Follow-up in 3 weeks      Recent Lab Results:  LIPID RESULTS:  Lab Results   Component Value Date    CHOL 227 (H) 04/05/2019    HDL 77 04/05/2019     (H) 04/05/2019    TRIG 51 04/05/2019    CHOLHDLRATIO 3.0 04/16/2007       LIVER ENZYME RESULTS:  Lab Results   Component Value Date    AST 13 03/14/2016    ALT 16 03/14/2016       CBC RESULTS:  Lab Results   Component Value Date    WBC 6.1 04/01/2021    RBC 4.76 04/01/2021    HGB 13.6 04/01/2021    HCT 43.5 04/01/2021    MCV 91 04/01/2021    MCH 28.6 04/01/2021    MCHC 31.3 (L) 04/01/2021    RDW 12.9 04/01/2021     04/01/2021       BMP RESULTS:  Lab Results   Component Value Date     04/01/2021    POTASSIUM 3.2 (L) 04/01/2021    CHLORIDE 108 04/01/2021    CO2 28 04/01/2021    ANIONGAP 5 04/01/2021    GLC 93 04/01/2021    BUN 10 04/01/2021    CR 0.88 04/01/2021    GFRESTIMATED 72 04/01/2021    GFRESTBLACK 83 04/01/2021    BENI 9.3 04/01/2021        A1C RESULTS:  No results found for: A1C    INR RESULTS:  Lab Results   Component Value Date    INR 0.93 01/09/2019    INR 1.01 04/04/2015       We greatly appreciate the opportunity to be involved in the care of your patient, Desire Moise.    Sincerely,  Marlon Toribio MD      CC  No referring provider defined for this encounter.                                                                       Service Date: 05/26/2021    CARDIOLOGY CLINIC VISIT NOTE    PRIMARY CARE DOCTOR:  Dr. Matilde Christopher, [facility].    HISTORY OF PRESENT ILLNESS:  Desire Moise, a 61-year-old woman with a history of Spontaneous Coronary Artery Dissection ( SCAD), essential hypertension and dyslipidemia, was seen today at your request for followup.    Ms. Moise had been cared for by my late colleague, Dr. Zuleta, for several years.  She last saw Dr. Zuleta in 2019.    Since last  seen in clinic, the patient remains free of chest, arm, neck, jaw or back discomfort with exertion.  She has been prone to occasional palpitations but Holter monitors have not demonstrated any significant arrhythmias.  The patient's blood pressure has been more difficult to control recently and lisinopril was increased from 10 to 20 mg daily. Presently her home blood pressures run 140 to 150/90 with her home cuff device. Ms. Moise does not follow a special diet at this time.  She exercises on a regular basis.  She does not abuse alcohol.  She tries to follow a prudent diet.    PAST MEDICAL HISTORY:    1.  Essential hypertension.  2.  History of spontaneous coronary artery dissection, 2018.  Diagnostic coronary angiography showing spontaneous dissection involving marginal branch.  No significant narrowing of other coronary vessels.  Subsequent imaging tests showing no evidence of fibromuscular dysplasia in carotid or renal beds.  3.  Dyslipidemia.  Most recent lipid profile with [facility] on 05/14/2021 showing total cholesterol of 230, LDL cholesterol of 147.    PHYSICAL EXAMINATION:    GENERAL:  Today demonstrates a very pleasant, cooperative and youthful-appearing 61-year-old woman.    VITAL SIGNS:  Her blood pressure was 148/90.  RESPIRATORY:  Her lungs are clear to percussion and auscultation.  CARDIOVASCULAR:  Shows a normal S1 with a normal S2.  There is no S3.  There is no murmur, rub or click.    EXTREMITIES:  Her pulses are full symmetrical.    RADIOLOGIC STUDIES:  I have carefully reviewed the patient's previous coronary angiogram and subsequent testing.  I agree with the diagnosis of spontaneous coronary artery dissection.  I am pleased to see the patient has had no further problems.    Ms. Moise's blood pressure remains suboptimally controlled.  We would like to target her blood pressure less than 130/80 if possible consistent with  American College of Cardiology guidelines.  The patient already  exercises on a regular basis and her weight is stable..  I reviewed the features of a Mediterranean-style, low-sodium diet.  As the patient's blood pressures at home appear to be running in the 140-150 systolic range, I believe more intensive pharmacologic  Treatment  is warranted.  I would add hydrochlorothiazide 12.5 mg daily and recommend a followup visit in about 2-3 weeks with our nurse practitioner.    RECOMMENDATIONS:    1.  Mediterranean-style weight loss diet.  2.  Limit sodium consumption to less than 2.5 grams daily.  3.  Regular exercise program 45 minutes 4-7 times a week.  4.  Add hydrochlorothiazide 12.5 mg daily.  5.  Followup visit with advanced level practitioner in about 3 weeks with a BMP at that time.    I will plan to follow the patient on an annual basis.    We greatly appreciate the opportunity to care for your patient, Miguel Moise.    Marlon Toribio MD    cc:  Matilde Christopher MD  [facility]  [address] [place] [address]    Marlon Toribio MD        D: 2021   T: 2021   MT: [id number]    Name:     MIGUEL MOISE  MRN:      [id number]        Account:      [id number]   :      1960           Service Date: 2021       Document: [id number]    Thank you for allowing me to participate in the care of your patient.      Sincerely,     Marlon Toribio MD     [facility]    cc:   No referring provider defined for this encounter.

## 2021-05-26 NOTE — PROGRESS NOTES
Service Date: 05/26/2021    CARDIOLOGY CLINIC VISIT NOTE    PRIMARY CARE DOCTOR:  Dr. Matilde Christopher, [facility].    HISTORY OF PRESENT ILLNESS:  Desire Moise, a 61-year-old woman with a history of Spontaneous Coronary Artery Dissection ( SCAD), essential hypertension and dyslipidemia, was seen today at your request for followup.    Ms. Moise had been cared for by my late colleague, Dr. Zuleta, for several years.  She last saw Dr. Zuleta in 2019.    Since last seen in clinic, the patient remains free of chest, arm, neck, jaw or back discomfort with exertion.  She has been prone to occasional palpitations but Holter monitors have not demonstrated any significant arrhythmias.  The patient's blood pressure has been more difficult to control recently and lisinopril was increased from 10 to 20 mg daily. Presently her home blood pressures run 140 to 150/90 with her home cuff device. Ms. Moise does not follow a special diet at this time.  She exercises on a regular basis.  She does not abuse alcohol.  She tries to follow a prudent diet.    PAST MEDICAL HISTORY:    1.  Essential hypertension.  2.  History of spontaneous coronary artery dissection, 2018.  Diagnostic coronary angiography showing spontaneous dissection involving marginal branch.  No significant narrowing of other coronary vessels.  Subsequent imaging tests showing no evidence of fibromuscular dysplasia in carotid or renal beds.  3.  Dyslipidemia.  Most recent lipid profile with [facility] on 05/14/2021 showing total cholesterol of 230, LDL cholesterol of 147.    PHYSICAL EXAMINATION:    GENERAL:  Today demonstrates a very pleasant, cooperative and youthful-appearing 61-year-old woman.    VITAL SIGNS:  Her blood pressure was 148/90.  RESPIRATORY:  Her lungs are clear to percussion and auscultation.  CARDIOVASCULAR:  Shows a normal S1 with a normal S2.  There is no S3.  There is no murmur, rub or click.    EXTREMITIES:  Her pulses are  full symmetrical.    RADIOLOGIC STUDIES:  I have carefully reviewed the patient's previous coronary angiogram and subsequent testing.  I agree with the diagnosis of spontaneous coronary artery dissection.  I am pleased to see the patient has had no further problems.    Ms. Moise's blood pressure remains suboptimally controlled.  We would like to target her blood pressure less than 130/80 if possible consistent with  American College of Cardiology guidelines.  The patient already exercises on a regular basis and her weight is stable..  I reviewed the features of a Mediterranean-style, low-sodium diet.  As the patient's blood pressures at home appear to be running in the 140-150 systolic range, I believe more intensive pharmacologic  Treatment  is warranted.  I would add hydrochlorothiazide 12.5 mg daily and recommend a followup visit in about 2-3 weeks with our nurse practitioner.    RECOMMENDATIONS:    1.  Mediterranean-style weight loss diet.  2.  Limit sodium consumption to less than 2.5 grams daily.  3.  Regular exercise program 45 minutes 4-7 times a week.  4.  Add hydrochlorothiazide 12.5 mg daily.  5.  Followup visit with advanced level practitioner in about 3 weeks with a BMP at that time.    I will plan to follow the patient on an annual basis.    We greatly appreciate the opportunity to care for your patient, Miguel Moise.    Marlon Toribio MD    cc:  Matilde Christopher MD  [facility] [address] [place] [address]    Marlon Toribio MD        D: 2021   T: 2021   MT: dw    Name:     MIGUEL MOISE  MRN:      [id number]        Account:      [id number]   :      1960           Service Date: 2021       Document: [id number]

## 2021-06-16 ENCOUNTER — TELEPHONE (OUTPATIENT)
Dept: CARDIOLOGY | Facility: CLINIC | Age: 61
End: 2021-06-16

## 2021-06-18 ENCOUNTER — CARE COORDINATION (OUTPATIENT)
Dept: CARDIOLOGY | Facility: CLINIC | Age: 61
End: 2021-06-18

## 2021-06-18 NOTE — PROGRESS NOTES
Received call from pt. She spoke with a nurse on Wednesday 6/16 and was having terrible side effects from Lisinopril 20mg daily. She was instructed to decrease to 10mg daily. Pt reports after that, she was not able to get to pharmacy so she has not taken Lisinopril since Wednesday. Pt also notes she has not started the hydrochlorothiazide she was prescribed at last visit 5/26 with Catarino. Pt reports she is very sensitive to medication. Her SBP was 170-180's with Lisinopril 20mg daily. The last 3 days SBP around 130 without any medication other than ASA 81mg daily. Pt reports she has has sleep better than she has in years (had been getting hot flashes, palpitations at night), but none since she has not taken Lisinopril. Pt reports she also has been working hard on her diet and exercise. She is walking every day. She has been eating low salt and low sugar. Pt would like staying off Lisinopril and hydrochlorothiazide for a week or two, and monitor BP to see if she can manage as naturally as possible. Pt reviewed she will continue her baby ASA, but wants to see if Dr. Toribio would be ok with this trial.      Sent message to Dr. Toribio for review.     Ariela Hollingsworth RN, BSN, CHFN  06/18/21 at 2:33 PM

## 2021-06-18 NOTE — PROGRESS NOTES
Marlon Toribio MD  You 23 minutes ago (2:52 PM)       Josue Titus     Most patients with essential hypertension will require long term pharmacologic treatment in addition to lifestyle intervention ( weight loss, exercise etc) When I saw her last her blood  pressure  was higher than our current professional guidelines advise and that is the reason we added low dose  hydrochlorothiazide. Neither lisinopril/hydrochlorothiazide are likely causes of her complaints. If she chooses to stay off her pills, she should check her blood pressure on a regular basis ( target is less than 130/80), but I believe is she truly has hypertension it is not wise to simply stop the drugs if we are uncertain that they are causing problems. I would suggest she check in with her primary care MD  but I usually do not advise stopping blood pressure medications in this fashion. She is unlikely to have any  short term side effects from stopping the medications  but untreated hypertension can cause stroke, heart attack and renal failure on a long term basis.         *Called pt to review, no answer. Left detailed message explaining Dr. Toribio' comments and recommendations, but if she chooses to stay off her medications, she should make sure to check her blood pressure on a regular basis with goal of <130/80. Also reviewed that he recommended she follow up with PCP as well about BP and medications, but Dr. Toribio recommends she stay on medications. Left call back number for questions.     Ariela Hollingsworth RN, BSN, CHFN  06/18/21 at 3:20 PM

## 2021-06-22 NOTE — TELEPHONE ENCOUNTER
Documentation from 6/16/21. Pt stating she is having side effects from Lisinopril 20 mg daily. States medication makes her have hot flashes and sleep poorly. Pt self decreased her Lisinopril to 10 mg daily. States she believes her sxs are making her have higher BP.     Last seen 5/26/21 by , BP not at goal. Started on hydrochlorothiazide 12.5 mg daily. Pt has not started this medication because she states she is sensitive to medications.    Pt on 6/18/21 called stating she would like to trial being off her Lisinopril and hydrochlorothiazide for about 2 weeks (see encounter for details).     Per , pt needs to check BPs on regular basis. We do not advise stopping her medications if she truly has HTN. Advised pt to follow up with PMD for further BP management. Goal is below 130/80. Recommendations called on 6/18/21. Pt did not answer. Detailed message left.     CLAUDE Lake

## 2021-06-29 NOTE — TELEPHONE ENCOUNTER
Spoke with patient in regards to conversation on 6/22/21. Pt would like to see PMD for HTN management. Pt has been off her medications we advised. Pt sees provider through [facility] on a regular basis. Pt does request an annual visit with .     Order placed for annual. Encouraged pt to continue close follow up with her PMD for her HTN. Pt agrees w/ plan. CLAUDE Lake

## 2021-10-06 ENCOUNTER — TELEPHONE (OUTPATIENT)
Dept: CARDIOLOGY | Facility: CLINIC | Age: 61
End: 2021-10-06

## 2021-10-06 NOTE — TELEPHONE ENCOUNTER
Called Desire for [facility] BP follow up. LMGINA Quinonez MA ([facility])

## 2025-01-25 ENCOUNTER — APPOINTMENT (OUTPATIENT)
Dept: MRI IMAGING | Facility: CLINIC | Age: 65
End: 2025-01-25
Attending: EMERGENCY MEDICINE
Payer: COMMERCIAL

## 2025-01-25 ENCOUNTER — APPOINTMENT (OUTPATIENT)
Dept: CT IMAGING | Facility: CLINIC | Age: 65
End: 2025-01-25
Attending: EMERGENCY MEDICINE
Payer: COMMERCIAL

## 2025-01-25 ENCOUNTER — HOSPITAL ENCOUNTER (EMERGENCY)
Facility: CLINIC | Age: 65
Discharge: HOME | End: 2025-01-25
Attending: EMERGENCY MEDICINE
Payer: COMMERCIAL

## 2025-01-25 VITALS
TEMPERATURE: 98.6 F | RESPIRATION RATE: 18 BRPM | BODY MASS INDEX: 24.59 KG/M2 | OXYGEN SATURATION: 99 % | DIASTOLIC BLOOD PRESSURE: 122 MMHG | SYSTOLIC BLOOD PRESSURE: 180 MMHG | WEIGHT: 152.34 LBS | HEART RATE: 65 BPM

## 2025-01-25 DIAGNOSIS — M54.2 ANTERIOR NECK PAIN: ICD-10-CM

## 2025-01-25 DIAGNOSIS — R42 DIZZINESS: ICD-10-CM

## 2025-01-25 DIAGNOSIS — R03.0 ELEVATED BLOOD PRESSURE READING: ICD-10-CM

## 2025-01-25 LAB
ANION GAP SERPL CALCULATED.3IONS-SCNC: 10 MMOL/L (ref 7–15)
BASOPHILS # BLD AUTO: 0.1 10E3/UL (ref 0–0.2)
BASOPHILS NFR BLD AUTO: 2 %
BUN SERPL-MCNC: 14.3 MG/DL (ref 8–23)
CALCIUM SERPL-MCNC: 9.1 MG/DL (ref 8.8–10.4)
CHLORIDE SERPL-SCNC: 99 MMOL/L (ref 98–107)
CREAT SERPL-MCNC: 0.85 MG/DL (ref 0.51–0.95)
EGFRCR SERPLBLD CKD-EPI 2021: 77 ML/MIN/1.73M2
EOSINOPHIL # BLD AUTO: 0.2 10E3/UL (ref 0–0.7)
EOSINOPHIL NFR BLD AUTO: 4 %
ERYTHROCYTE [DISTWIDTH] IN BLOOD BY AUTOMATED COUNT: 13 % (ref 10–15)
FLUAV RNA SPEC QL NAA+PROBE: NEGATIVE
FLUBV RNA RESP QL NAA+PROBE: NEGATIVE
GLUCOSE BLDC GLUCOMTR-MCNC: 98 MG/DL (ref 70–99)
GLUCOSE SERPL-MCNC: 97 MG/DL (ref 70–99)
HCO3 SERPL-SCNC: 27 MMOL/L (ref 22–29)
HCT VFR BLD AUTO: 43.5 % (ref 35–47)
HGB BLD-MCNC: 13.8 G/DL (ref 11.7–15.7)
HOLD SPECIMEN: NORMAL
IMM GRANULOCYTES # BLD: 0 10E3/UL
IMM GRANULOCYTES NFR BLD: 0 %
LYMPHOCYTES # BLD AUTO: 0.9 10E3/UL (ref 0.8–5.3)
LYMPHOCYTES NFR BLD AUTO: 17 %
MCH RBC QN AUTO: 28.2 PG (ref 26.5–33)
MCHC RBC AUTO-ENTMCNC: 31.7 G/DL (ref 31.5–36.5)
MCV RBC AUTO: 89 FL (ref 78–100)
MONOCYTES # BLD AUTO: 0.4 10E3/UL (ref 0–1.3)
MONOCYTES NFR BLD AUTO: 8 %
NEUTROPHILS # BLD AUTO: 3.6 10E3/UL (ref 1.6–8.3)
NEUTROPHILS NFR BLD AUTO: 70 %
NRBC # BLD AUTO: 0 10E3/UL
NRBC BLD AUTO-RTO: 0 /100
PLATELET # BLD AUTO: 264 10E3/UL (ref 150–450)
POTASSIUM SERPL-SCNC: 4.3 MMOL/L (ref 3.4–5.3)
RBC # BLD AUTO: 4.9 10E6/UL (ref 3.8–5.2)
RSV RNA SPEC NAA+PROBE: NEGATIVE
S PYO DNA THROAT QL NAA+PROBE: NOT DETECTED
SARS-COV-2 RNA RESP QL NAA+PROBE: NEGATIVE
SODIUM SERPL-SCNC: 136 MMOL/L (ref 135–145)
TROPONIN T SERPL HS-MCNC: <6 NG/L
TSH SERPL DL<=0.005 MIU/L-ACNC: 1.87 UIU/ML (ref 0.3–4.2)
WBC # BLD AUTO: 5.1 10E3/UL (ref 4–11)

## 2025-01-25 PROCEDURE — 258N000003 HC RX IP 258 OP 636: Performed by: EMERGENCY MEDICINE

## 2025-01-25 PROCEDURE — 96360 HYDRATION IV INFUSION INIT: CPT | Mod: 59

## 2025-01-25 PROCEDURE — 85048 AUTOMATED LEUKOCYTE COUNT: CPT | Performed by: EMERGENCY MEDICINE

## 2025-01-25 PROCEDURE — 250N000011 HC RX IP 250 OP 636: Performed by: EMERGENCY MEDICINE

## 2025-01-25 PROCEDURE — 87651 STREP A DNA AMP PROBE: CPT | Performed by: EMERGENCY MEDICINE

## 2025-01-25 PROCEDURE — 70496 CT ANGIOGRAPHY HEAD: CPT

## 2025-01-25 PROCEDURE — 84443 ASSAY THYROID STIM HORMONE: CPT | Performed by: EMERGENCY MEDICINE

## 2025-01-25 PROCEDURE — 70553 MRI BRAIN STEM W/O & W/DYE: CPT

## 2025-01-25 PROCEDURE — 255N000002 HC RX 255 OP 636: Performed by: EMERGENCY MEDICINE

## 2025-01-25 PROCEDURE — 36415 COLL VENOUS BLD VENIPUNCTURE: CPT | Performed by: EMERGENCY MEDICINE

## 2025-01-25 PROCEDURE — 80048 BASIC METABOLIC PNL TOTAL CA: CPT | Performed by: EMERGENCY MEDICINE

## 2025-01-25 PROCEDURE — 87637 SARSCOV2&INF A&B&RSV AMP PRB: CPT | Performed by: EMERGENCY MEDICINE

## 2025-01-25 PROCEDURE — 96361 HYDRATE IV INFUSION ADD-ON: CPT | Mod: 59

## 2025-01-25 PROCEDURE — 70491 CT SOFT TISSUE NECK W/DYE: CPT

## 2025-01-25 PROCEDURE — 70450 CT HEAD/BRAIN W/O DYE: CPT

## 2025-01-25 PROCEDURE — 99285 EMERGENCY DEPT VISIT HI MDM: CPT | Mod: 25

## 2025-01-25 PROCEDURE — A9585 GADOBUTROL INJECTION: HCPCS | Performed by: EMERGENCY MEDICINE

## 2025-01-25 PROCEDURE — 82962 GLUCOSE BLOOD TEST: CPT

## 2025-01-25 PROCEDURE — 250N000013 HC RX MED GY IP 250 OP 250 PS 637: Performed by: EMERGENCY MEDICINE

## 2025-01-25 PROCEDURE — 82565 ASSAY OF CREATININE: CPT | Performed by: EMERGENCY MEDICINE

## 2025-01-25 PROCEDURE — 85004 AUTOMATED DIFF WBC COUNT: CPT | Performed by: EMERGENCY MEDICINE

## 2025-01-25 PROCEDURE — 93005 ELECTROCARDIOGRAM TRACING: CPT

## 2025-01-25 PROCEDURE — 85018 HEMOGLOBIN: CPT | Performed by: EMERGENCY MEDICINE

## 2025-01-25 PROCEDURE — 84484 ASSAY OF TROPONIN QUANT: CPT | Performed by: EMERGENCY MEDICINE

## 2025-01-25 RX ORDER — IOPAMIDOL 755 MG/ML
112 INJECTION, SOLUTION INTRAVASCULAR ONCE
Status: COMPLETED | OUTPATIENT
Start: 2025-01-25 | End: 2025-01-25

## 2025-01-25 RX ORDER — KETOROLAC TROMETHAMINE 15 MG/ML
15 INJECTION, SOLUTION INTRAMUSCULAR; INTRAVENOUS ONCE
Status: COMPLETED | OUTPATIENT
Start: 2025-01-25 | End: 2025-01-25

## 2025-01-25 RX ORDER — MECLIZINE HYDROCHLORIDE 25 MG/1
25-50 TABLET ORAL ONCE
Status: COMPLETED | OUTPATIENT
Start: 2025-01-25 | End: 2025-01-25

## 2025-01-25 RX ORDER — ACETAMINOPHEN 325 MG/10.15ML
1000 LIQUID ORAL ONCE
Status: COMPLETED | OUTPATIENT
Start: 2025-01-25 | End: 2025-01-25

## 2025-01-25 RX ORDER — GADOBUTROL 604.72 MG/ML
7 INJECTION INTRAVENOUS ONCE
Status: COMPLETED | OUTPATIENT
Start: 2025-01-25 | End: 2025-01-25

## 2025-01-25 RX ORDER — MECLIZINE HYDROCHLORIDE 25 MG/1
25 TABLET ORAL EVERY 6 HOURS PRN
Qty: 30 TABLET | Refills: 1 | Status: SHIPPED | OUTPATIENT
Start: 2025-01-25

## 2025-01-25 RX ORDER — ACETAMINOPHEN 500 MG
1000 TABLET ORAL ONCE
Status: COMPLETED | OUTPATIENT
Start: 2025-01-25 | End: 2025-01-25

## 2025-01-25 RX ADMIN — GADOBUTROL 7 ML: 604.72 INJECTION INTRAVENOUS at 14:40

## 2025-01-25 RX ADMIN — MECLIZINE HYDROCHLORIDE 25 MG: 25 TABLET ORAL at 13:54

## 2025-01-25 RX ADMIN — ACETAMINOPHEN 1000 MG: 325 SUSPENSION ORAL at 11:48

## 2025-01-25 RX ADMIN — SODIUM CHLORIDE 1000 ML: 9 INJECTION, SOLUTION INTRAVENOUS at 11:47

## 2025-01-25 RX ADMIN — IOPAMIDOL 112 ML: 755 INJECTION, SOLUTION INTRAVENOUS at 11:05

## 2025-01-25 ASSESSMENT — ACTIVITIES OF DAILY LIVING (ADL)
ADLS_ACUITY_SCORE: 41

## 2025-01-25 ASSESSMENT — COLUMBIA-SUICIDE SEVERITY RATING SCALE - C-SSRS
1. IN THE PAST MONTH, HAVE YOU WISHED YOU WERE DEAD OR WISHED YOU COULD GO TO SLEEP AND NOT WAKE UP?: NO
6. HAVE YOU EVER DONE ANYTHING, STARTED TO DO ANYTHING, OR PREPARED TO DO ANYTHING TO END YOUR LIFE?: NO
2. HAVE YOU ACTUALLY HAD ANY THOUGHTS OF KILLING YOURSELF IN THE PAST MONTH?: NO

## 2025-01-25 NOTE — ED TRIAGE NOTES
Pt c/o 3 weeks of neck pain, worse on the left. Now c/o swelling on both sides of her neck and c/o pain. C/o headache in the back of her head and c/o feeling very dizzy, also states she feels like her balance is off.      Triage Assessment (Adult)       Row Name 01/25/25 1023          Triage Assessment    Airway WDL WDL        Respiratory WDL    Respiratory WDL WDL        Skin Circulation/Temperature WDL    Skin Circulation/Temperature WDL WDL        Cardiac WDL    Cardiac WDL WDL        Peripheral/Neurovascular WDL    Peripheral Neurovascular WDL WDL        Cognitive/Neuro/Behavioral WDL    Cognitive/Neuro/Behavioral WDL WDL

## 2025-01-25 NOTE — ED PROVIDER NOTES
Emergency Department Note      History of Present Illness     Chief Complaint   Dizziness      HPI   Desire Moise is a 64 year old female with history of hypertension who presents alone for evaluation of dizziness and left-sided neck pain.  She developed left anterolateral neck pain about 3 weeks ago.  She was seen by primary care 9 days ago for this pain and was told it was likely musculoskeletal.  In last couple of days she feels the area has started to swell and has developed pain on the right side as well.  Other than this she was in her baseline state of health when she went to bed last night.  When she woke this morning she felt dizzy as if she was tipping over.  She bent over to put her hair in a ponytail and was so off balance she fell.  This and her persistent 8 out of 10 neck pain prompted her visit.  She has had some occipital headache as well.  She denies numbness or tingling, change in vision or speech, fever or cough, sore throat, and dental pain.  She denies any trauma outside of the fall today including no MVA.  She has not visited the chiropractor.    Independent Historian   None    Review of External Notes   I reviewed a nurse triage call from 1/15/2025 for left jaw pain and a primary care visit from 1/16/2025 for neck pain.    Past Medical History     Medical History and Problem List   Acute coronary syndrome  Anemia  CAD  Cardiomyopathy ischemic  Coronary artery dissection  Cyst renal  Degenerative disc disease lumbar  Diverticulosis  GERD  Hyperlipidemia  Hypertension  NSTEMI  Spina bifida occulta    Medications   aspirin 81 mg  hydrochlorothiazide  lisinopril    Surgical History   D & C  Excision cyst breast  Hymen incision  ORIF wrist R  Remove hardware distal radius R  NOE & BSO  Simpson tooth removal    Physical Exam     Patient Vitals for the past 24 hrs:   BP Temp Temp src Pulse Resp SpO2 Weight   01/25/25 1619 (!) 180/122 -- -- 65 18 99 % --   01/25/25 1027 (!) 170/105 98.6  F (37   C) Temporal 84 20 98 % 69.1 kg (152 lb 5.4 oz)     Physical Exam  General: Well-developed and well-nourished. Well appearing middle-aged  woman. Cooperative.  Head:  Atraumatic.  Eyes:  Conjunctivae, lids, and sclerae are normal.  ENT:    Normal nose. Moist mucous membranes.  No sublingual edema.  No posterior oropharyngeal findings including no edema, erythema, mass, or exudate.  No dental tenderness.  Neck:  Supple. Normal range of motion.  No obvious edema or masses of the anterior neck.  Mild tenderness of the right submandibular neck.  CV:  Regular rate and rhythm. Normal heart sounds with no murmurs, rubs, or gallops detected.  Resp:  No respiratory distress. Clear to auscultation bilaterally without decreased breath sounds, wheezing, rales, or rhonchi.  GI:  Non-distended.    MS:  Normal ROM. No bilateral lower extremity edema.  Skin:  Warm. Non-diaphoretic. No pallor.  Neuro:  Awake. A&Ox3.     Strength 5/5 bilateral upper and lower extremities.  No pronator drift.  Sensation intact to light touch.  No facial droop. No dysarthria.  No aphasia.  PERRL.   EOMI. No nystagmus.  No visual field deficits.  No dysmetria.  No dysdiadochokinesis.  Psych: Normal mood and affect. Normal speech.  Vitals reviewed.    Diagnostics     Lab Results   Labs Ordered and Resulted from Time of ED Arrival to Time of ED Departure   GLUCOSE BY METER - Normal       Result Value    GLUCOSE BY METER POCT 98     BASIC METABOLIC PANEL - Normal    Sodium 136      Potassium 4.3      Chloride 99      Carbon Dioxide (CO2) 27      Anion Gap 10      Urea Nitrogen 14.3      Creatinine 0.85      GFR Estimate 77      Calcium 9.1      Glucose 97     TROPONIN T, HIGH SENSITIVITY - Normal    Troponin T, High Sensitivity <6     TSH WITH FREE T4 REFLEX - Normal    TSH 1.87     INFLUENZA A/B, RSV AND SARS-COV2 PCR - Normal    Influenza A PCR Negative      Influenza B PCR Negative      RSV PCR Negative      SARS CoV2 PCR Negative     GROUP A  STREPTOCOCCUS PCR THROAT SWAB - Normal    Group A strep by PCR Not Detected     CBC WITH PLATELETS AND DIFFERENTIAL    WBC Count 5.1      RBC Count 4.90      Hemoglobin 13.8      Hematocrit 43.5      MCV 89      MCH 28.2      MCHC 31.7      RDW 13.0      Platelet Count 264      % Neutrophils 70      % Lymphocytes 17      % Monocytes 8      % Eosinophils 4      % Basophils 2      % Immature Granulocytes 0      NRBCs per 100 WBC 0      Absolute Neutrophils 3.6      Absolute Lymphocytes 0.9      Absolute Monocytes 0.4      Absolute Eosinophils 0.2      Absolute Basophils 0.1      Absolute Immature Granulocytes 0.0      Absolute NRBCs 0.0         Imaging   MR Brain w/o & w Contrast   Final Result   IMPRESSION:   1.  Normal head MRI.      CTA Head Neck w Contrast   Final Result   IMPRESSION:    HEAD CT:   1.  No CT findings of acute intracranial process.      HEAD CTA:    1.  No high-grade proximal arterial stenosis, aneurysm, or high-flow vascular malformation identified.      NECK CTA:   1.  No hemodynamically significant stenosis in the neck vessels.    2.  No evidence for dissection.            Soft tissue neck CT w contrast   Final Result   IMPRESSION:    1.  Small nonspecific hypoattenuating lesion along the inferior aspect the right palatine tonsil could represent a submucosal cyst or some trapped fluid in a tonsillar crypt. A small mass or abscess are thought to be less likely. Please correlate with    direct inspection.   2.  No definite pathologic cervical lymphadenopathy.   3.  Other presumed incidental/nonacute findings, as described.            CT Head w/o Contrast   Final Result   IMPRESSION:    HEAD CT:   1.  No CT findings of acute intracranial process.      HEAD CTA:    1.  No high-grade proximal arterial stenosis, aneurysm, or high-flow vascular malformation identified.      NECK CTA:   1.  No hemodynamically significant stenosis in the neck vessels.    2.  No evidence for dissection.               EKG  Indication: dizziness   Time: 1035  Rate 70 bpm. MO interval 134. QRS duration 84. QT/QTc 392/423.   Normal sinus rhythm  Normal ECG    No acute ST changes.  No significant change as compared to prior, dated 04/01/2021.    Independent Interpretation   CT Head: No intracranial hemorrhage or midline shift.    ED Course      Medications Administered   Medications   sodium chloride 0.9% BOLUS 1,000 mL (0 mLs Intravenous Stopped 1/25/25 1620)   ketorolac (TORADOL) injection 15 mg (15 mg Intravenous Not Given 1/25/25 1139)   acetaminophen (TYLENOL) tablet 1,000 mg (1,000 mg Oral Not Given 1/25/25 1135)   acetaminophen (TYLENOL) oral liquid 1,000 mg (1,000 mg Oral $Given 1/25/25 1148)   meclizine (ANTIVERT) tablet 25-50 mg (25 mg Oral $Given 1/25/25 1354)     ED Course   ED Course as of 01/25/25 1624   Sat Jan 25, 2025   1026 I evaluated the patient and obtained history.    1350 I rechecked and updated the patient. I answered her questions about MRI.   1614 I updated the patient on results and plan for discharge with ENT follow-up.  All questions answered.  She did feel meclizine was helpful.     Additional Documentation  Social Determinants of Health: Supportive .    Medical Decision Making / Diagnosis   KETTY Phipps is a 64 year old woman ***    Disposition   The patient was discharged.     Diagnosis     ICD-10-CM    1. Dizziness  R42       2. Anterior neck pain  M54.2 Adult ENT  Referral      3. Elevated blood pressure reading  R03.0            Discharge Medications   New Prescriptions    MECLIZINE (ANTIVERT) 25 MG TABLET    Take 1 tablet (25 mg) by mouth every 6 hours as needed for dizziness.         Scribe Disclosure:  YAIR, Marleni Ware, am serving as a scribe at 10:43 AM on 1/25/2025 to document services personally performed by Mary Nina MD based on my observations and the provider's statements to me.      normal.  Because she complains of throat or neck pain and subjective swelling (I am unable to appreciate this on exam) I did include CT of the soft tissue of the neck.  This reveals a hypoattenuating lesion on the right palatine tonsil.  This is not visualized on exam and not the site of maximal/initial symptoms for the patient. It is of uncertain clinical relevance    She is treated with IV fluids, Toradol, Tylenol, and meclizine.  She felt improved and was comfortable with plan for discharge.  I will have her follow-up with ENT for her neck pain and tonsil finding on CT.  I will have her use meclizine as needed for dizziness.  Indications to return were reviewed.  All questions answered.  Amenable to discharge.    Disposition   The patient was discharged.     Diagnosis     ICD-10-CM    1. Dizziness  R42       2. Anterior neck pain  M54.2 Adult ENT  Referral      3. Elevated blood pressure reading  R03.0            Discharge Medications   New Prescriptions    MECLIZINE (ANTIVERT) 25 MG TABLET    Take 1 tablet (25 mg) by mouth every 6 hours as needed for dizziness.         Scribe Disclosure:  I, Marleni Ware, am serving as a scribe at 10:43 AM on 1/25/2025 to document services personally performed by Mary Nina MD based on my observations and the provider's statements to me.        Mary Nina MD  02/02/25 0023

## 2025-01-25 NOTE — DISCHARGE INSTRUCTIONS
Meclizine as needed for dizziness.  Follow-up with ENT regarding your neck pain and CT finding.  Return with worsening symptoms or new concerns of any kind.

## 2025-01-27 LAB
ATRIAL RATE - MUSE: 70 BPM
DIASTOLIC BLOOD PRESSURE - MUSE: NORMAL MMHG
INTERPRETATION ECG - MUSE: NORMAL
P AXIS - MUSE: 50 DEGREES
PR INTERVAL - MUSE: 134 MS
QRS DURATION - MUSE: 84 MS
QT - MUSE: 392 MS
QTC - MUSE: 423 MS
R AXIS - MUSE: 51 DEGREES
SYSTOLIC BLOOD PRESSURE - MUSE: NORMAL MMHG
T AXIS - MUSE: 67 DEGREES
VENTRICULAR RATE- MUSE: 70 BPM

## 2025-02-01 ENCOUNTER — APPOINTMENT (OUTPATIENT)
Dept: CT IMAGING | Facility: CLINIC | Age: 65
End: 2025-02-01
Attending: EMERGENCY MEDICINE
Payer: COMMERCIAL

## 2025-02-01 ENCOUNTER — HOSPITAL ENCOUNTER (EMERGENCY)
Facility: CLINIC | Age: 65
Discharge: HOME | End: 2025-02-01
Attending: EMERGENCY MEDICINE | Admitting: EMERGENCY MEDICINE
Payer: COMMERCIAL

## 2025-02-01 VITALS
DIASTOLIC BLOOD PRESSURE: 86 MMHG | HEART RATE: 66 BPM | HEIGHT: 65 IN | SYSTOLIC BLOOD PRESSURE: 132 MMHG | TEMPERATURE: 97.2 F | RESPIRATION RATE: 18 BRPM | WEIGHT: 152.34 LBS | OXYGEN SATURATION: 98 % | BODY MASS INDEX: 25.38 KG/M2

## 2025-02-01 DIAGNOSIS — R09.89 THROAT TIGHTNESS: ICD-10-CM

## 2025-02-01 LAB
ANION GAP SERPL CALCULATED.3IONS-SCNC: 13 MMOL/L (ref 7–15)
BASOPHILS # BLD AUTO: 0.1 10E3/UL (ref 0–0.2)
BASOPHILS NFR BLD AUTO: 1 %
BUN SERPL-MCNC: 19.1 MG/DL (ref 8–23)
CALCIUM SERPL-MCNC: 9.3 MG/DL (ref 8.8–10.4)
CHLORIDE SERPL-SCNC: 104 MMOL/L (ref 98–107)
CREAT SERPL-MCNC: 0.83 MG/DL (ref 0.51–0.95)
EGFRCR SERPLBLD CKD-EPI 2021: 79 ML/MIN/1.73M2
EOSINOPHIL # BLD AUTO: 0.2 10E3/UL (ref 0–0.7)
EOSINOPHIL NFR BLD AUTO: 3 %
ERYTHROCYTE [DISTWIDTH] IN BLOOD BY AUTOMATED COUNT: 13.1 % (ref 10–15)
GLUCOSE SERPL-MCNC: 105 MG/DL (ref 70–99)
HCO3 SERPL-SCNC: 25 MMOL/L (ref 22–29)
HCT VFR BLD AUTO: 40.2 % (ref 35–47)
HGB BLD-MCNC: 13.1 G/DL (ref 11.7–15.7)
IMM GRANULOCYTES # BLD: 0 10E3/UL
IMM GRANULOCYTES NFR BLD: 0 %
LYMPHOCYTES # BLD AUTO: 1.6 10E3/UL (ref 0.8–5.3)
LYMPHOCYTES NFR BLD AUTO: 27 %
MCH RBC QN AUTO: 28.4 PG (ref 26.5–33)
MCHC RBC AUTO-ENTMCNC: 32.6 G/DL (ref 31.5–36.5)
MCV RBC AUTO: 87 FL (ref 78–100)
MONOCYTES # BLD AUTO: 0.5 10E3/UL (ref 0–1.3)
MONOCYTES NFR BLD AUTO: 9 %
NEUTROPHILS # BLD AUTO: 3.4 10E3/UL (ref 1.6–8.3)
NEUTROPHILS NFR BLD AUTO: 60 %
NRBC # BLD AUTO: 0 10E3/UL
NRBC BLD AUTO-RTO: 0 /100
PLATELET # BLD AUTO: 251 10E3/UL (ref 150–450)
POTASSIUM SERPL-SCNC: 3.5 MMOL/L (ref 3.4–5.3)
RBC # BLD AUTO: 4.62 10E6/UL (ref 3.8–5.2)
SODIUM SERPL-SCNC: 142 MMOL/L (ref 135–145)
WBC # BLD AUTO: 5.7 10E3/UL (ref 4–11)

## 2025-02-01 PROCEDURE — 85041 AUTOMATED RBC COUNT: CPT | Performed by: EMERGENCY MEDICINE

## 2025-02-01 PROCEDURE — 85004 AUTOMATED DIFF WBC COUNT: CPT | Performed by: EMERGENCY MEDICINE

## 2025-02-01 PROCEDURE — 70491 CT SOFT TISSUE NECK W/DYE: CPT

## 2025-02-01 PROCEDURE — 250N000011 HC RX IP 250 OP 636: Performed by: EMERGENCY MEDICINE

## 2025-02-01 PROCEDURE — 250N000009 HC RX 250: Performed by: EMERGENCY MEDICINE

## 2025-02-01 PROCEDURE — 99285 EMERGENCY DEPT VISIT HI MDM: CPT | Mod: 25

## 2025-02-01 PROCEDURE — 82565 ASSAY OF CREATININE: CPT | Performed by: EMERGENCY MEDICINE

## 2025-02-01 PROCEDURE — 80048 BASIC METABOLIC PNL TOTAL CA: CPT | Performed by: EMERGENCY MEDICINE

## 2025-02-01 PROCEDURE — 36415 COLL VENOUS BLD VENIPUNCTURE: CPT | Performed by: EMERGENCY MEDICINE

## 2025-02-01 RX ORDER — IOPAMIDOL 755 MG/ML
500 INJECTION, SOLUTION INTRAVASCULAR ONCE
Status: COMPLETED | OUTPATIENT
Start: 2025-02-01 | End: 2025-02-01

## 2025-02-01 RX ADMIN — IOPAMIDOL 90 ML: 755 INJECTION, SOLUTION INTRAVENOUS at 05:06

## 2025-02-01 RX ADMIN — SODIUM CHLORIDE 65 ML: 9 INJECTION, SOLUTION INTRAVENOUS at 05:06

## 2025-02-01 ASSESSMENT — COLUMBIA-SUICIDE SEVERITY RATING SCALE - C-SSRS
2. HAVE YOU ACTUALLY HAD ANY THOUGHTS OF KILLING YOURSELF IN THE PAST MONTH?: NO
1. IN THE PAST MONTH, HAVE YOU WISHED YOU WERE DEAD OR WISHED YOU COULD GO TO SLEEP AND NOT WAKE UP?: NO
6. HAVE YOU EVER DONE ANYTHING, STARTED TO DO ANYTHING, OR PREPARED TO DO ANYTHING TO END YOUR LIFE?: NO

## 2025-02-01 ASSESSMENT — ACTIVITIES OF DAILY LIVING (ADL): ADLS_ACUITY_SCORE: 41

## 2025-02-01 NOTE — ED PROVIDER NOTES
Emergency Department Note      History of Present Illness     Chief Complaint   difficulty swallowing and Neck Pain      HPI   Desire Moise is a 64 year old female who presents to the emergency department with difficulty swallowing, neck tightness and neck pain.  Patient reports for the last 3 weeks she has been having ongoing symptoms of anterior neck pain, tightness and difficulty swallowing.  Last night she reports her symptoms became worse and she had some difficulty catching her breath and swallowing.  This prompted her return to the emergency department.  She denies any fever or chills.  Denies any nausea or vomiting.  No trauma to the area.  Notes that she saw the ENT recently and was started on amoxicillin for presumed tonsillitis.    Independent Historian   Patient    Review of External Notes   I reviewed outpatient family medicine office visit from 1/16/2025 detailing some musculoskeletal neck pain.  Patient was seen in our emergency department on 1/25/2025 and underwent a very thorough workup including CT of the soft tissue of the neck, CTA of the head and neck, CT of the head and MRI of the brain which were all quite reassuring.  There is some questionable possible lesion on the right palatine tonsil which may have represented an early abscess.    Past Medical History     Medical History and Problem List   Past Medical History:   Diagnosis Date    ACS (acute coronary syndrome) (H) 10/13/2018    Anemia 4/16/2007    CAD (coronary artery disease)     Diverticulosis     Family history of other cardiovascular diseases 9/23/2002    GERD (gastroesophageal reflux disease)     HTN (hypertension)     NSTEMI (non-ST elevated myocardial infarction) (H) 10/13/2018    Palpitations     Spina bifida occulta 10/23/2002    Spontaneous dissection of coronary artery        Medications   aspirin 81 MG EC tablet  hydrochlorothiazide (MICROZIDE) 12.5 MG capsule  lisinopril (ZESTRIL) 10 MG tablet  meclizine (ANTIVERT) 25  "MG tablet        Surgical History   Past Surgical History:   Procedure Laterality Date    GYN SURGERY      HYSTERECTOMY      INCISION OF HYMEN  1980    OPEN REDUCTION INTERNAL FIXATION WRIST Right 9/11/2017    Procedure: OPEN REDUCTION INTERNAL FIXATION WRIST;  Open reduction and internal fixation right intra-articular displaced distal radius fracture;  Surgeon: Santi Villarreal MD;  Location: [facility] OR    REMOVE HARDWARE UPPER EXTREMITY Right 2/2/2018    Procedure: REMOVE HARDWARE UPPER EXTREMITY;  Removal of deep implant, right distal radius;  Surgeon: Santi Villarreal MD;  Location: [facility] OR    [place] teeth surgery  1980       Physical Exam     Patient Vitals for the past 24 hrs:   BP Temp Temp src Pulse Resp SpO2 Height Weight   02/01/25 0548 132/86 -- -- 66 18 98 % -- --   02/01/25 0445 (!) 145/108 -- -- 75 -- -- -- --   02/01/25 0430 (!) 152/111 -- -- 76 -- 96 % -- --   02/01/25 0415 (!) 168/110 -- -- 74 -- 99 % -- --   02/01/25 0412 (!) 189/111 97.2  F (36.2  C) Temporal 74 20 99 % 1.651 m (5' 5\") 69.1 kg (152 lb 5.4 oz)     Physical Exam  General: Patient is awake, alert  Head: The scalp, face, and head appear normal  Eyes: The pupils are equal, round, and reactive to light. Conjunctivae and sclerae are normal  ENT: External acoustic canals are normal. The oropharynx is normal without erythema. Uvula is in the midline  Neck: Normal range of motion.  No significant lymphadenopathy or masses were palpated.  No meningismus noted.  CV: Regular rate and rhythm.   Resp: Lungs are clear without wheezes or rales. No respiratory distress.   MS: Normal tone.   Skin: No rash or lesions noted. Normal capillary refill noted  Neuro: Speech is normal and fluent. Face is symmetric. Moving all extremities.   Psych:  Normal affect.  Appropriate interactions.        Diagnostics     Lab Results   Labs Ordered and Resulted from Time of ED Arrival to Time of ED Departure   BASIC METABOLIC PANEL - Abnormal       Result Value    " Sodium 142      Potassium 3.5      Chloride 104      Carbon Dioxide (CO2) 25      Anion Gap 13      Urea Nitrogen 19.1      Creatinine 0.83      GFR Estimate 79      Calcium 9.3      Glucose 105 (*)    CBC WITH PLATELETS AND DIFFERENTIAL    WBC Count 5.7      RBC Count 4.62      Hemoglobin 13.1      Hematocrit 40.2      MCV 87      MCH 28.4      MCHC 32.6      RDW 13.1      Platelet Count 251      % Neutrophils 60      % Lymphocytes 27      % Monocytes 9      % Eosinophils 3      % Basophils 1      % Immature Granulocytes 0      NRBCs per 100 WBC 0      Absolute Neutrophils 3.4      Absolute Lymphocytes 1.6      Absolute Monocytes 0.5      Absolute Eosinophils 0.2      Absolute Basophils 0.1      Absolute Immature Granulocytes 0.0      Absolute NRBCs 0.0         Imaging   CT Soft Tissue Neck w Contrast   Final Result   IMPRESSION:    1.  Normal soft tissue neck CT.            ED Course      Medications Administered   Medications   CT Scan Flush (65 mLs Intravenous $Given 2/1/25 0506)   iopamidol (ISOVUE-370) solution 500 mL (90 mLs Intravenous $Given 2/1/25 0506)           Medical Decision Making / Diagnosis       KETTY   Desire Moise is a 64 year old female who presents to the emergency department with difficulty swallowing, anterior neck tightness and feeling of swelling in the anterior but particularly the right anterior side of her throat.  Patient was seen and evaluated here on 1/25 and had a very thorough evaluation  including CT of the soft tissue of the neck, CTA of the head and neck, CT of the head and MRI of the brain which were all quite reassuring.  There is some questionable possible lesion on the right palatine tonsil which may have represented an early abscess.  Ultimately followed up with ENT and was started on antibiotics.  She has been taking those antibiotics but has continued to have symptoms.  Upon initial evaluation here she is hemodynamically stable with normal vital signs.  She is afebrile  and oxygenating well on room air.  On physical exam I do not appreciate any evidence of a peritonsillar abscess or significant posterior oropharyngeal erythema, edema or exudates.  On anterior neck exam there is no palpable mass or significant lymphadenopathy.  No significant overlying erythema or ecchymosis.  A repeat CT of the soft tissue of the neck shows no significant evidence of peritonsillar or retropharyngeal abscess.  Furthermore there is no evidence of soft tissue neck mass or lymphadenopathy.  Blood work was reassuring.  I also reviewed that she had recent COVID, influenza, RSV and strep testing which was all negative.  Given lack of fever and white blood cell count we will hold off on any repeat testing here today.  There certainly may be an element of muscular strain and spasm leading to her symptomatology.  In looking back she had a outpatient family medicine visit for left-sided neck pain.  It is possible that she is overcompensating with different neck muscles that is led to her symptoms of anterior neck discomfort.  Much less likely occult stroke given her recent workup.  Alternative diagnoses including scleroderma, occult acid reflux, posterior nasal drip, globus sensation etc. were also considered.  Patient safe for discharge home.  Is not show any signs of difficulty tolerating secretions or respiratory distress..    Disposition   The patient was discharged.     Diagnosis     ICD-10-CM    1. Throat tightness  R09.89            Discharge Medications   Discharge Medication List as of 2/1/2025  5:45 AM          MD Navjot Doyle Christopher Joseph, MD  02/01/25 0619

## 2025-02-01 NOTE — ED TRIAGE NOTES
Pt reports difficulty swallowing. Symptoms started 3 weeks ago. Pt seen on the 25th of Jan.

## (undated) DEVICE — GLOVE PROTEXIS POWDER FREE 8.0 ORTHOPEDIC 2D73ET80

## (undated) DEVICE — PREP CHLORAPREP 26ML TINTED ORANGE  260815

## (undated) DEVICE — ESU PENCIL W/HOLSTER E2350H

## (undated) DEVICE — CAST PADDING 4" STERILE 9044S

## (undated) DEVICE — GLOVE PROTEXIS POWDER FREE 7.5 ORTHOPEDIC 2D73ET75

## (undated) DEVICE — SUCTION CANISTER MEDIVAC LINER 3000ML W/LID 65651-530

## (undated) DEVICE — BNDG ELASTIC 3"X5YDS UNSTERILE 6611-30

## (undated) DEVICE — DRAPE C-ARM 60X42" 1013

## (undated) DEVICE — CAST BUCKET

## (undated) DEVICE — TOURNIQUET CUFF 18" REPRO RED 60-7070-103

## (undated) DEVICE — BAG CLEAR TRASH 1.3M 39X33" P4040C

## (undated) DEVICE — GLOVE PROTEXIS BLUE W/NEU-THERA 8.0  2D73EB80

## (undated) DEVICE — LINEN TOWEL PACK X5 5464

## (undated) DEVICE — DRILL BIT HANDINN 2.0MM FDB2.0

## (undated) DEVICE — SLING ARM MED 79-99155

## (undated) DEVICE — SUTURE VICRYL+ 2-0 CT-2 27" UND VCP269H

## (undated) DEVICE — SU VICRYL 3-0 CT-2 27" UND J232H

## (undated) DEVICE — SOL WATER IRRIG 1000ML BOTTLE 2F7114

## (undated) DEVICE — LINEN HALF SHEET 5512

## (undated) DEVICE — LINEN FULL SHEET 5511

## (undated) DEVICE — SU MONOCRYL 4-0 PS-2 27" UND Y426H

## (undated) DEVICE — TUBING SUCTION 12"X1/4" N612

## (undated) DEVICE — LINEN ORTHO PACK 5446

## (undated) DEVICE — ESU GROUND PAD ADULT W/CORD E7507

## (undated) DEVICE — SOL ADH LIQUID BENZOIN SWAB 0.6ML C1544

## (undated) DEVICE — DRSG STERI STRIP 1/2X4" R1547

## (undated) DEVICE — SOL NACL 0.9% IRRIG 1000ML BOTTLE 2F7124

## (undated) DEVICE — DRILL BIT HANDINN 2.5MM DB2.5

## (undated) DEVICE — TOURNIQUET CUFF 12" STERILE 60-7070-102

## (undated) DEVICE — PACK HAND SOP32HARMO

## (undated) RX ORDER — HEPARIN SODIUM 1000 [USP'U]/ML
INJECTION, SOLUTION INTRAVENOUS; SUBCUTANEOUS
Status: DISPENSED
Start: 2018-10-13

## (undated) RX ORDER — HYDROXYZINE HYDROCHLORIDE 25 MG/1
TABLET, FILM COATED ORAL
Status: DISPENSED
Start: 2017-09-11

## (undated) RX ORDER — FENTANYL CITRATE 50 UG/ML
INJECTION, SOLUTION INTRAMUSCULAR; INTRAVENOUS
Status: DISPENSED
Start: 2018-02-02

## (undated) RX ORDER — HYDROMORPHONE HYDROCHLORIDE 1 MG/ML
INJECTION, SOLUTION INTRAMUSCULAR; INTRAVENOUS; SUBCUTANEOUS
Status: DISPENSED
Start: 2017-09-11

## (undated) RX ORDER — PHENYLEPHRINE HCL IN 0.9% NACL 1 MG/10 ML
SYRINGE (ML) INTRAVENOUS
Status: DISPENSED
Start: 2017-09-11

## (undated) RX ORDER — CLOPIDOGREL 300 MG/1
TABLET, FILM COATED ORAL
Status: DISPENSED
Start: 2018-10-13

## (undated) RX ORDER — ACETAMINOPHEN 325 MG/1
TABLET ORAL
Status: DISPENSED
Start: 2018-02-02

## (undated) RX ORDER — LIDOCAINE HYDROCHLORIDE 10 MG/ML
INJECTION, SOLUTION EPIDURAL; INFILTRATION; INTRACAUDAL; PERINEURAL
Status: DISPENSED
Start: 2018-10-13

## (undated) RX ORDER — CEFAZOLIN SODIUM 2 G/100ML
INJECTION, SOLUTION INTRAVENOUS
Status: DISPENSED
Start: 2017-09-11

## (undated) RX ORDER — BUPIVACAINE HYDROCHLORIDE 5 MG/ML
INJECTION, SOLUTION EPIDURAL; INTRACAUDAL
Status: DISPENSED
Start: 2017-09-11

## (undated) RX ORDER — LIDOCAINE HYDROCHLORIDE 10 MG/ML
INJECTION, SOLUTION EPIDURAL; INFILTRATION; INTRACAUDAL; PERINEURAL
Status: DISPENSED
Start: 2017-09-11

## (undated) RX ORDER — TRAMADOL HYDROCHLORIDE 50 MG/1
TABLET ORAL
Status: DISPENSED
Start: 2017-09-11

## (undated) RX ORDER — FENTANYL CITRATE 50 UG/ML
INJECTION, SOLUTION INTRAMUSCULAR; INTRAVENOUS
Status: DISPENSED
Start: 2017-09-11

## (undated) RX ORDER — SCOLOPAMINE TRANSDERMAL SYSTEM 1 MG/1
PATCH, EXTENDED RELEASE TRANSDERMAL
Status: DISPENSED
Start: 2017-09-11

## (undated) RX ORDER — LABETALOL HYDROCHLORIDE 5 MG/ML
INJECTION, SOLUTION INTRAVENOUS
Status: DISPENSED
Start: 2017-09-11

## (undated) RX ORDER — GLYCOPYRROLATE 0.2 MG/ML
INJECTION INTRAMUSCULAR; INTRAVENOUS
Status: DISPENSED
Start: 2017-09-11

## (undated) RX ORDER — PROPOFOL 10 MG/ML
INJECTION, EMULSION INTRAVENOUS
Status: DISPENSED
Start: 2017-09-11

## (undated) RX ORDER — ACETAMINOPHEN 10 MG/ML
INJECTION, SOLUTION INTRAVENOUS
Status: DISPENSED
Start: 2017-09-11

## (undated) RX ORDER — HYDROMORPHONE HCL/0.9% NACL/PF 0.2MG/0.2
SYRINGE (ML) INTRAVENOUS
Status: DISPENSED
Start: 2017-09-11

## (undated) RX ORDER — KETAMINE HYDROCHLORIDE 10 MG/ML
INJECTION, SOLUTION INTRAMUSCULAR; INTRAVENOUS
Status: DISPENSED
Start: 2017-09-11

## (undated) RX ORDER — CEFAZOLIN SODIUM 2 G/100ML
INJECTION, SOLUTION INTRAVENOUS
Status: DISPENSED
Start: 2018-02-02

## (undated) RX ORDER — FENTANYL CITRATE 50 UG/ML
INJECTION, SOLUTION INTRAMUSCULAR; INTRAVENOUS
Status: DISPENSED
Start: 2018-10-13

## (undated) RX ORDER — DEXAMETHASONE SODIUM PHOSPHATE 4 MG/ML
INJECTION, SOLUTION INTRA-ARTICULAR; INTRALESIONAL; INTRAMUSCULAR; INTRAVENOUS; SOFT TISSUE
Status: DISPENSED
Start: 2017-09-11

## (undated) RX ORDER — ONDANSETRON 2 MG/ML
INJECTION INTRAMUSCULAR; INTRAVENOUS
Status: DISPENSED
Start: 2017-09-11